# Patient Record
Sex: FEMALE | Race: BLACK OR AFRICAN AMERICAN | NOT HISPANIC OR LATINO | Employment: FULL TIME | URBAN - METROPOLITAN AREA
[De-identification: names, ages, dates, MRNs, and addresses within clinical notes are randomized per-mention and may not be internally consistent; named-entity substitution may affect disease eponyms.]

---

## 2017-01-20 ENCOUNTER — OFFICE VISIT (OUTPATIENT)
Dept: FAMILY MEDICINE | Facility: CLINIC | Age: 47
End: 2017-01-20
Payer: COMMERCIAL

## 2017-01-20 VITALS
HEIGHT: 68 IN | HEART RATE: 93 BPM | OXYGEN SATURATION: 100 % | BODY MASS INDEX: 32.89 KG/M2 | WEIGHT: 217 LBS | SYSTOLIC BLOOD PRESSURE: 120 MMHG | TEMPERATURE: 97.5 F | DIASTOLIC BLOOD PRESSURE: 78 MMHG

## 2017-01-20 DIAGNOSIS — M25.561 ACUTE PAIN OF BOTH KNEES: Primary | ICD-10-CM

## 2017-01-20 DIAGNOSIS — M25.562 ACUTE PAIN OF BOTH KNEES: Primary | ICD-10-CM

## 2017-01-20 PROCEDURE — 99213 OFFICE O/P EST LOW 20 MIN: CPT | Performed by: PHYSICIAN ASSISTANT

## 2017-01-20 RX ORDER — NAPROXEN 500 MG/1
500 TABLET ORAL 2 TIMES DAILY PRN
Qty: 30 TABLET | Refills: 1 | Status: SHIPPED | OUTPATIENT
Start: 2017-01-20 | End: 2017-05-31

## 2017-01-20 ASSESSMENT — PAIN SCALES - GENERAL: PAINLEVEL: NO PAIN (0)

## 2017-01-20 NOTE — PROGRESS NOTES
"  SUBJECTIVE:                                                    Liane Key is a 46 year old female who presents to clinic today for the following health issues:      Musculoskeletal problem/pain      Duration: 3 weeks    Description  Location: bilateral knee pain    Intensity:  Moderate to severe    Accompanying signs and symptoms: none    History  Previous similar problem: no   Previous evaluation:  none    Precipitating or alleviating factors:  Trauma or overuse: no   Aggravating factors include: worse when first waking up in the morning, and after sitting.     Therapies tried and outcome: Epsom salt baths           Problem list and histories reviewed & adjusted, as indicated.  Additional history: unsure of cause.  She did move at the end of Dec. But did not really hurt too much after.  Pain has been going of for 3 weeks.  Right is worse than left.  She has tried some epsom salt soaks.  No medication.  No previous issues knees.      Problem list, Medication list, Allergies, and Medical/Social/Surgical histories reviewed in EPIC and updated as appropriate.    ROS:  Constitutional, HEENT, cardiovascular, pulmonary, gi and gu systems are negative, except as otherwise noted.    OBJECTIVE:                                                    /78 mmHg  Pulse 93  Temp(Src) 97.5  F (36.4  C) (Oral)  Ht 5' 7.68\" (1.719 m)  Wt 217 lb (98.431 kg)  BMI 33.31 kg/m2  SpO2 100%  Body mass index is 33.31 kg/(m^2).  GENERAL: alert and no distress  EYES: Eyes grossly normal to inspection  RESP: lungs clear to auscultation - no rales, rhonchi or wheezes  CV: regular rate and rhythm, normal S1 S2, no S3 or S4, no murmur, click or rub, no peripheral edema and peripheral pulses strong  MS: pain with getting out of chair.  Full active ROM in b/l knees with crepitus.      Diagnostic Test Results:  none      ASSESSMENT/PLAN:                                                            1. Acute pain of both knees  Ice, rest, " and nsaids over weekend.  If symptoms persist or worsen, encourage follow up with ortho.  - naproxen (NAPROSYN) 500 MG tablet; Take 1 tablet (500 mg) by mouth 2 times daily as needed for moderate pain  Dispense: 30 tablet; Refill: 1  - ORTHOPEDICS ADULT REFERRAL        Archie Marx PA-C  [facility]

## 2017-01-20 NOTE — PATIENT INSTRUCTIONS
Please ice your knee 2-3 times a day for 10-15 minutes at a time.  Take the naproxen twice a day.  If pain continues into next week and not seeing any improvement, call ortho for further evaluation.      [facility]    If you have any questions regarding to your visit please contact your care team:       Team Purple:   Clinic Hours Telephone Number   [address]., Dr.   7am-7pm  Monday - Thursday   7am-5pm  Fridays  [phone]  (Appointment scheduling available 24/7)    Questions about your Visit?   Team Line:  [phone]   Urgent Care - [place] and [facility] - 11am-9pm Monday-Friday Saturday-Sunday- 9am-5pm   [place] - 5pm-9pm Monday-Friday Saturday-Sunday- 9am-5pm  [phone] - [place]   [phone] - [place]       What options do I have for visits at the clinic other than the traditional office visit?  To expand how we care for you, many of our providers are utilizing electronic visits (e-visits) and telephone visits, when medically appropriate, for interactions with their patients rather than a visit in the clinic.   We also offer nurse visits for many medical concerns. Just like any other service, we will bill your insurance company for this type of visit based on time spent on the phone with your provider. Not all insurance companies cover these visits. Please check with your medical insurance if this type of visit is covered. You will be responsible for any charges that are not paid by your insurance.      E-visits via Expreem:  generally incur a $35.00 fee.  Telephone visits:  Time spent on the phone: *charged based on time that is spent on the phone in increments of 10 minutes. Estimated cost:   5-10 mins $30.00   11-20 mins. $59.00   21-30 mins. $85.00     Use Expreem (secure email communication and access to your chart) to send your primary care provider a message or make an appointment. Ask someone on  your Team how to sign up for [employer].  For a Price Quote for your services, please call our Consumer Price Line at [phone].  As always, Thank you for trusting us with your health care needs!    Discharged by Peggy No CMA

## 2017-01-20 NOTE — NURSING NOTE
"Chief Complaint   Patient presents with     Knee Pain     bilateral x 3 weeks       Initial /78 mmHg  Pulse 93  Temp(Src) 97.5  F (36.4  C) (Oral)  Ht 5' 7.68\" (1.719 m)  Wt 217 lb (98.431 kg)  BMI 33.31 kg/m2  SpO2 100% Estimated body mass index is 33.31 kg/(m^2) as calculated from the following:    Height as of this encounter: 5' 7.68\" (1.719 m).    Weight as of this encounter: 217 lb (98.431 kg).  BP completed using cuff size: large    "

## 2017-01-20 NOTE — MR AVS SNAPSHOT
After Visit Summary   1/20/2017    Liane Key    MRN: [id number]           Patient Information     Date Of Birth          1970        Visit Information        Provider Department      1/20/2017 3:40 PM Archie Marx PA-C [facility]        Today's Diagnoses     Acute pain of both knees    -  1       Care Instructions    Please ice your knee 2-3 times a day for 10-15 minutes at a time.  Take the naproxen twice a day.  If pain continues into next week and not seeing any improvement, call ortho for further evaluation.      [facility]    If you have any questions regarding to your visit please contact your care team:       Team Purple:   Clinic Hours Telephone Number   MARIO Lara Dr., Dr.   7am-7pm  Monday - Thursday   7am-5pm  Fridays  [phone]  (Appointment scheduling available 24/7)    Questions about your Visit?   Team Line:  [phone]   Urgent Care - [place] - 11am-9pm Monday-Friday Saturday-Sunday- 9am-5pm   [place] - 5pm-9pm Monday-Friday Saturday-Sunday- 9am-5pm  [phone] - [place]   [phone] - [place]       What options do I have for visits at the clinic other than the traditional office visit?  To expand how we care for you, many of our providers are utilizing electronic visits (e-visits) and telephone visits, when medically appropriate, for interactions with their patients rather than a visit in the clinic.   We also offer nurse visits for many medical concerns. Just like any other service, we will bill your insurance company for this type of visit based on time spent on the phone with your provider. Not all insurance companies cover these visits. Please check with your medical insurance if this type of visit is covered. You will be responsible for any charges that are not paid by your insurance.      E-visits via [employer]:  generally incur a  $35.00 fee.  Telephone visits:  Time spent on the phone: *charged based on time that is spent on the phone in increments of 10 minutes. Estimated cost:   5-10 mins $30.00   11-20 mins. $59.00   21-30 mins. $85.00     Use [employer] (secure email communication and access to your chart) to send your primary care provider a message or make an appointment. Ask someone on your Team how to sign up for [employer].  For a Price Quote for your services, please call our [employer] Price Line at [phone].  As always, Thank you for trusting us with your health care needs!    Discharged by Peggy No CMA          Follow-ups after your visit        Additional Services     ORTHOPEDICS ADULT REFERRAL       Your provider has referred you to: [facility] [phone]   [email or web address]    Please be aware that coverage of these services is subject to the terms and limitations of your health insurance plan.  Call member services at your health plan with any benefit or coverage questions.      Please bring the following to your appointment:    >>   Any x-rays, CTs or MRIs which have been performed.  Contact the facility where they were done to arrange for  prior to your scheduled appointment.    >>   List of current medications   >>   This referral request   >>   Any documents/labs given to you for this referral                  Who to contact     If you have questions or need follow up information about today's clinic visit or your schedule please contact [facility] directly at [phone].  Normal or non-critical lab and imaging results will be communicated to you by MyChart, letter or phone within 4 business days after the clinic has received the results. If you do not hear from us within 7 days, please contact the clinic through MyChart or phone. If you have a critical or abnormal lab result, we will notify you by phone as soon as possible.  Submit refill  "requests through [employer]an or call your pharmacy and they will forward the refill request to us. Please allow 3 business days for your refill to be completed.          Additional Information About Your Visit        D8A GroupharJigs[employer] Meeting Information     Novan lets you send messages to your doctor, view your test results, renew your prescriptions, schedule appointments and more. To sign up, go to [email or web address] . Click on \"Log in\" on the left side of the screen, which will take you to the Welcome page. Then click on \"Sign up Now\" on the right side of the page.     You will be asked to enter the access code listed below, as well as some personal information. Please follow the directions to create your username and password.     Your access code is: [email or web address]  Expires: 2017  4:03 PM     Your access code will  in 90 days. If you need help or a new code, please call your [place] clinic or [phone].        Care EveryWhere ID     This is your Care EveryWhere ID. This could be used by other organizations to access your [place] medical records  [id number]        Your Vitals Were     Pulse Temperature Height BMI (Body Mass Index) Pulse Oximetry       93 97.5  F (36.4  C) (Oral) 5' 7.68\" (1.719 m) 33.31 kg/m2 100%        Blood Pressure from Last 3 Encounters:   17 120/78   16 124/88   08/15/16 106/76    Weight from Last 3 Encounters:   17 217 lb (98.431 kg)   16 221 lb (100.245 kg)   08/15/16 216 lb (97.977 kg)              We Performed the Following     ORTHOPEDICS ADULT REFERRAL          Today's Medication Changes          These changes are accurate as of: 17  4:04 PM.  If you have any questions, ask your nurse or doctor.               These medicines have changed or have updated prescriptions.        Dose/Directions    * naproxen 500 MG tablet   Commonly known as:  NAPROSYN   This may have changed:  Another medication with the same name was added. Make sure you understand how and " when to take each.   Used for:  Left ankle pain   Changed by:  Archie Marx PA-C        TAKE 1 TABLET (500 MG) BY MOUTH 2 TIMES DAILY AS NEEDED FOR MODERATE PAIN   Quantity:  60 tablet   Refills:  0       * naproxen 500 MG tablet   Commonly known as:  NAPROSYN   This may have changed:  You were already taking a medication with the same name, and this prescription was added. Make sure you understand how and when to take each.   Used for:  Acute pain of both knees   Changed by:  Archie Marx PA-C        Dose:  500 mg   Take 1 tablet (500 mg) by mouth 2 times daily as needed for moderate pain   Quantity:  30 tablet   Refills:  1       * Notice:  This list has 2 medication(s) that are the same as other medications prescribed for you. Read the directions carefully, and ask your doctor or other care provider to review them with you.         Where to get your medicines      These medications were sent to [facility] [place] - [address] [place] [address]  [address] [place] [address] [address] [facility] [address]     Phone:  [phone]    - naproxen 500 MG tablet             Primary Care Provider Office Phone # Fax #    Nahomi Castañeda [phone]       [facility] [address] [place] [address]        Thank you!     Thank you for choosing [facility]  for your care. Our goal is always to provide you with excellent care. Hearing back from our patients is one way we can continue to improve our services. Please take a few minutes to complete the written survey that you may receive in the mail after your visit with us. Thank you!             Your Updated Medication List - Protect others around you: Learn how to safely use, store and throw away your medicines at www.disposemymeds.org.          This list is accurate as of: 1/20/17  4:04 PM.  Always use your most recent med list.                   Brand Name Dispense Instructions for use    ferrous  sulfate 325 (65 FE) MG tablet    IRON    60 tablet    Take 1 tablet (325 mg) by mouth 2 times daily       levothyroxine 137 MCG tablet    SYNTHROID/LEVOTHROID    90 tablet    Take 1 tablet (137 mcg) by mouth daily       MULTI-VITAMIN PO      Take  by mouth daily.       * naproxen 500 MG tablet    NAPROSYN    60 tablet    TAKE 1 TABLET (500 MG) BY MOUTH 2 TIMES DAILY AS NEEDED FOR MODERATE PAIN       * naproxen 500 MG tablet    NAPROSYN    30 tablet    Take 1 tablet (500 mg) by mouth 2 times daily as needed for moderate pain       traZODone 50 MG tablet    DESYREL    30 tablet    TAKE 1 TABLET (50 MG) BY MOUTH AT BEDTIME       * Notice:  This list has 2 medication(s) that are the same as other medications prescribed for you. Read the directions carefully, and ask your doctor or other care provider to review them with you.

## 2017-05-12 DIAGNOSIS — E89.0 HYPOTHYROIDISM FOLLOWING RADIOIODINE THERAPY: ICD-10-CM

## 2017-05-12 RX ORDER — LEVOTHYROXINE SODIUM 137 UG/1
TABLET ORAL
Qty: 90 TABLET | Refills: 0 | Status: SHIPPED | OUTPATIENT
Start: 2017-05-12 | End: 2017-10-14

## 2017-05-12 NOTE — TELEPHONE ENCOUNTER
levothyroxine (SYNTHROID, LEVOTHROID) 137 MCG tablet     Last Written Prescription Date: 08/21/16  Last Quantity: 90, # refills: 2  Last Office Visit with G, P or [facility] prescribing provider: 01/20/17        TSH   Date Value Ref Range Status   08/15/2016 0.72 0.40 - 4.00 mU/L Final           Ana Daigle, CMA

## 2017-05-12 NOTE — TELEPHONE ENCOUNTER
Prescription approved per [facility] Refill Protocol.  Rula Parada, RN - BC

## 2017-05-31 ENCOUNTER — RADIANT APPOINTMENT (OUTPATIENT)
Dept: GENERAL RADIOLOGY | Facility: CLINIC | Age: 47
End: 2017-05-31
Attending: NURSE PRACTITIONER
Payer: COMMERCIAL

## 2017-05-31 ENCOUNTER — OFFICE VISIT (OUTPATIENT)
Dept: FAMILY MEDICINE | Facility: CLINIC | Age: 47
End: 2017-05-31
Payer: COMMERCIAL

## 2017-05-31 VITALS
OXYGEN SATURATION: 100 % | WEIGHT: 228 LBS | DIASTOLIC BLOOD PRESSURE: 86 MMHG | TEMPERATURE: 99.2 F | RESPIRATION RATE: 14 BRPM | BODY MASS INDEX: 34.56 KG/M2 | HEART RATE: 82 BPM | SYSTOLIC BLOOD PRESSURE: 132 MMHG | HEIGHT: 68 IN

## 2017-05-31 DIAGNOSIS — M54.50 ACUTE RIGHT-SIDED LOW BACK PAIN WITHOUT SCIATICA: ICD-10-CM

## 2017-05-31 DIAGNOSIS — M25.561 PATELLOFEMORAL JOINT PAIN, RIGHT: Primary | ICD-10-CM

## 2017-05-31 DIAGNOSIS — M25.561 PATELLOFEMORAL JOINT PAIN, RIGHT: ICD-10-CM

## 2017-05-31 DIAGNOSIS — Z12.31 ENCOUNTER FOR SCREENING MAMMOGRAM FOR BREAST CANCER: ICD-10-CM

## 2017-05-31 PROCEDURE — 99213 OFFICE O/P EST LOW 20 MIN: CPT | Performed by: NURSE PRACTITIONER

## 2017-05-31 PROCEDURE — 73562 X-RAY EXAM OF KNEE 3: CPT | Mod: RT

## 2017-05-31 RX ORDER — DICLOFENAC SODIUM 75 MG/1
75 TABLET, DELAYED RELEASE ORAL 2 TIMES DAILY PRN
Qty: 30 TABLET | Refills: 1 | Status: SHIPPED | OUTPATIENT
Start: 2017-05-31 | End: 2017-09-16

## 2017-05-31 ASSESSMENT — PAIN SCALES - GENERAL: PAINLEVEL: SEVERE PAIN (6)

## 2017-05-31 NOTE — NURSING NOTE
"Chief Complaint   Patient presents with     Musculoskeletal Problem     right knee       Initial /86  Pulse 82  Temp 99.2  F (37.3  C) (Oral)  Resp 14  Ht 5' 7.5\" (1.715 m)  Wt 228 lb (103.4 kg)  LMP 05/10/2017 (Approximate)  SpO2 100%  Breastfeeding? No  BMI 35.18 kg/m2 Estimated body mass index is 35.18 kg/(m^2) as calculated from the following:    Height as of this encounter: 5' 7.5\" (1.715 m).    Weight as of this encounter: 228 lb (103.4 kg).  Medication Reconciliation: complete   Maritza Lindsey CMA (AAMA)      "

## 2017-05-31 NOTE — LETTER
[facility]  [address] [place] [address]    June 1, 2017    Liane Key  [address] [place] [address]          Dear Liane,  Your results show mild wear and tear in the knee, but I suspect most of your pain is coming from the Patellofemoral Joint syndrome as discussed yesterday. Please continue with the plan discussed in clinic.   Enclosed is a copy of your results.     Results for orders placed or performed in visit on 05/31/17   XR Knee Right 3 Views    Narrative    RIGHT KNEE THREE VIEWS   5/31/2017 7:11 PM    HISTORY: Pain in right knee.    COMPARISON: None.      Impression    IMPRESSION: No fracture or osseous lesion is seen. There is mild joint  space loss in the medial compartment suggesting early degenerative  change. No other abnormality is noted.     JANEE LUA MD       If you have any questions or concerns, please call myself or my nurse at [phone].      Sincerely,      Moon Gusman, QUENTIN /pb

## 2017-05-31 NOTE — PROGRESS NOTES
SUBJECTIVE:                                                    Liane Key is a 47 year old female who presents to clinic today for the following health issues:    Musculoskeletal problem/pain      Duration: 1.5 months    Description  Location: right knee and hip    Intensity:  moderate, severe    Accompanying signs and symptoms: radiation of pain to hip and lower back    History  Previous similar problem: no   Previous evaluation:  none    Precipitating or alleviating factors:  Trauma or overuse: no   Aggravating factors include: standing, walking and climbing stairs    Therapies tried and outcome: nothing     Not taking any over the counter medications. Elevating the leg. Pain worsens after a period of rest and then standing up again. In the last few days having worsened right hip and right low back pain.    Problem list and histories reviewed & adjusted, as indicated.  Additional history: as documented    Patient Active Problem List   Diagnosis     CARDIOVASCULAR SCREENING; LDL GOAL LESS THAN 160     Graves disease     Hypothyroidism following radioiodine therapy     Anemia     Syphilis     Past Surgical History:   Procedure Laterality Date     ANKLE SURGERY  1990    left- s/p ORIF     SURGICAL HISTORY OF -   1998    removal of gallstones     TUBAL LIGATION         Social History   Substance Use Topics     Smoking status: Former Smoker     Packs/day: 0.20     Years: 5.00     Types: Cigarettes     Quit date: 5/30/2012     Smokeless tobacco: Never Used     Alcohol use Yes      Comment: occasional      Family History   Problem Relation Age of Onset     Cancer - colorectal Mother      Unknown/Adopted Maternal Grandmother      Unknown/Adopted Maternal Grandfather      Unknown/Adopted Paternal Grandmother      Unknown/Adopted Paternal Grandfather      Depression Sister      Thyroid Disease Maternal Aunt      CANCER No family hx of      DIABETES No family hx of      Hypertension No family hx of      CEREBROVASCULAR  "DISEASE No family hx of      Glaucoma No family hx of      Macular Degeneration No family hx of            Reviewed and updated as needed this visit by clinical staff  Tobacco  Allergies  Meds  Med Hx  Surg Hx  Fam Hx  Soc Hx      Reviewed and updated as needed this visit by Provider         ROS:  Constitutional, MS, neuro systems are negative, except as otherwise noted.    OBJECTIVE:                                                    /86  Pulse 82  Temp 99.2  F (37.3  C) (Oral)  Resp 14  Ht 5' 7.5\" (1.715 m)  Wt 228 lb (103.4 kg)  LMP 05/10/2017 (Approximate)  SpO2 100%  Breastfeeding? No  BMI 35.18 kg/m2  Body mass index is 35.18 kg/(m^2).  GENERAL: healthy, alert and no distress  MS: Right anterior knee tender over patellar tendon, no joint line tenderness. Pain with full flexion, crepitus with ROM. Micah's negative. Negative to varus/valgus stress. Tenderness of right paralumbar muscles and right greater trochanter.  NEURO: Normal strength and tone, mentation intact and speech normal. Patellar reflexes symmetric.    Diagnostic Test Results:  none      ASSESSMENT/PLAN:                                                          1. Patellofemoral joint pain, right  Start Physical Therapy, ice the knee two times daily, Voltaren as needed.  - XR Knee Right 3 Views; Future  - diclofenac (VOLTAREN) 75 MG EC tablet; Take 1 tablet (75 mg) by mouth 2 times daily as needed for moderate pain  Dispense: 30 tablet; Refill: 1  - [facility]    2. Acute right-sided low back pain without sciatica  I suspect this is muscular and related to compensating for the knee pain.   - diclofenac (VOLTAREN) 75 MG EC tablet; Take 1 tablet (75 mg) by mouth 2 times daily as needed for moderate pain  Dispense: 30 tablet; Refill: 1  - [facility]    3. Encounter for screening mammogram for breast cancer    - MA Digital Screening Bilateral; Future    Follow up with " PT    VAL Titus CNP  [facility]

## 2017-05-31 NOTE — MR AVS SNAPSHOT
After Visit Summary   5/31/2017    Liane Key    MRN: [id number]           Patient Information     Date Of Birth          1970        Visit Information        Provider Department      5/31/2017 6:20 PM Moon Gusman APRN [facility]        Today's Diagnoses     Patellofemoral joint pain, right    -  1    Acute right-sided low back pain without sciatica        Encounter for screening mammogram for breast cancer          Care Instructions    [facility]    If you have any questions regarding to your visit please contact your care team:       Team Red:   Clinic Hours Telephone Number   Dr. Glenis Gusman, NP   7am-7pm  Monday - Thursday   7am-5pm  Fridays  [phone]  (Appointment scheduling available 24/7)    Questions about your visit?   Team Line  [phone]   [facility] [place] [facility]am-9pm Monday-Friday Saturday-Sunday- 9am-5pm   [place] - 5pm-9pm Monday-Friday Saturday-Sunday- 9am-5pm  [phone] - [facility]  [phone] - [place]       What options do I have for visits at the clinic other than the traditional office visit?  To expand how we care for you, many of our providers are utilizing electronic visits (e-visits) and telephone visits, when medically appropriate, for interactions with their patients rather than a visit in the clinic.   We also offer nurse visits for many medical concerns. Just like any other service, we will bill your insurance company for this type of visit based on time spent on the phone with your provider. Not all insurance companies cover these visits. Please check with your medical insurance if this type of visit is covered. You will be responsible for any charges that are not paid by your insurance.      E-visits via Sourcery:  generally incur a $35.00 fee.  Telephone visits:  Time spent on the phone: *charged based on time that is  spent on the phone in increments of 10 minutes. Estimated cost:   5-10 mins $30.00   11-20 mins. $59.00   21-30 mins. $85.00     Use Camrivoxt (secure email communication and access to your chart) to send your primary care provider a message or make an appointment. Ask someone on your Team how to sign up for Instant Opinion.  For a Price Quote for your services, please call our [employer] Price Line at [phone].      As always, Thank you for trusting us with your health care needs!  Discharged by Pamela NEGRETE CMA ([facility])                Follow-ups after your visit        Additional Services     [facility] PT, HAND, AND CHIROPRACTIC REFERRAL       === This order will print in the [facility] Scheduling  Office ===    Physical therapy, hand therapy and chiropractic care are available through:    [facility]  [facility] [place] [facility] to the [facility]    Call one easy number to schedule at any of the above locations:  [phone].    Your provider has referred you to Physical Therapy at [facility] or [facility]    Indication/Reason for Referral: Knee Pain and Low Back Pain  Onset of Illness:  acute  Therapy Orders:  Evaluate and Treat  Special Programs:  None  Special Request:  None    Lisset Darling      Additional Comments for the therapist or chiropractor:      Please be aware that coverage of these services is subject to the terms and limitations of your health insurance plan.  Call member services at your health plan with any benefit or coverage questions.      Please bring the following to your appointment:    >>   Your personal calendar for scheduling future appointments  >>   Comfortable clothing                  Future tests that were ordered for you today     Open Future Orders        Priority Expected Expires Ordered    MA Digital Screening Bilateral Routine  5/31/2018 5/31/2017    XR Knee Right 3 Views Routine 5/31/2017 5/31/2018 5/31/2017            Who to contact     If you have questions or need follow up  "information about today's clinic visit or your schedule please contact [facility] directly at [phone].  Normal or non-critical lab and imaging results will be communicated to you by MyChart, letter or phone within 4 business days after the clinic has received the results. If you do not hear from us within 7 days, please contact the clinic through Netbiscuitshart or phone. If you have a critical or abnormal lab result, we will notify you by phone as soon as possible.  Submit refill requests through SoundSenasation or call your pharmacy and they will forward the refill request to us. Please allow 3 business days for your refill to be completed.          Additional Information About Your Visit        NetbiscuitsharY-Klub Information     SoundSenasation lets you send messages to your doctor, view your test results, renew your prescriptions, schedule appointments and more. To sign up, go to [email or web address] . Click on \"Log in\" on the left side of the screen, which will take you to the Welcome page. Then click on \"Sign up Now\" on the right side of the page.     You will be asked to enter the access code listed below, as well as some personal information. Please follow the directions to create your username and password.     Your access code is: [email or web address]  Expires: 2017  6:58 PM     Your access code will  in 90 days. If you need help or a new code, please call your [place] clinic or [phone].        Care EveryWhere ID     This is your Care EveryWhere ID. This could be used by other organizations to access your [place] medical records  [id number]        Your Vitals Were     Pulse Temperature Respirations Height Last Period Pulse Oximetry    82 99.2  F (37.3  C) (Oral) 14 5' 7.5\" (1.715 m) 05/10/2017 (Approximate) 100%    Breastfeeding? BMI (Body Mass Index)                No 35.18 kg/m2           Blood Pressure from Last 3 Encounters:   17 132/86   17 120/78   16 124/88    Weight from Last 3 " Encounters:   05/31/17 228 lb (103.4 kg)   01/20/17 217 lb (98.4 kg)   09/08/16 221 lb (100.2 kg)              We Performed the Following     [facility] PT, HAND, AND CHIROPRACTIC REFERRAL          Today's Medication Changes          These changes are accurate as of: 5/31/17  6:58 PM.  If you have any questions, ask your nurse or doctor.               Start taking these medicines.        Dose/Directions    diclofenac 75 MG EC tablet   Commonly known as:  VOLTAREN   Used for:  Patellofemoral joint pain, right, Acute right-sided low back pain without sciatica   Started by:  Moon Gusman APRN CNP        Dose:  75 mg   Take 1 tablet (75 mg) by mouth 2 times daily as needed for moderate pain   Quantity:  30 tablet   Refills:  1         Stop taking these medicines if you haven't already. Please contact your care team if you have questions.     naproxen 500 MG tablet   Commonly known as:  NAPROSYN   Stopped by:  Moon Gusman APRN CNP                Where to get your medicines      These medications were sent to [facility]/pharmacy #[id number] - [place], [address] OF [address], [facility] [address]     Phone:  [phone]     diclofenac 75 MG EC tablet                Primary Care Provider Office Phone # Fax #    Nahomi Castañeda [phone]       [address] [address]        Thank you!     Thank you for choosing [facility]  for your care. Our goal is always to provide you with excellent care. Hearing back from our patients is one way we can continue to improve our services. Please take a few minutes to complete the written survey that you may receive in the mail after your visit with us. Thank you!             Your Updated Medication List - Protect others around you: Learn how to safely use, store and throw away your medicines at www.disposemymeds.org.          This list is accurate as of:  5/31/17  6:58 PM.  Always use your most recent med list.                   Brand Name Dispense Instructions for use    diclofenac 75 MG EC tablet    VOLTAREN    30 tablet    Take 1 tablet (75 mg) by mouth 2 times daily as needed for moderate pain       ferrous sulfate 325 (65 FE) MG tablet    IRON    60 tablet    Take 1 tablet (325 mg) by mouth 2 times daily       levothyroxine 137 MCG tablet    SYNTHROID/LEVOTHROID    90 tablet    TAKE 1 TABLET (137 MCG) BY MOUTH DAILY       MULTI-VITAMIN PO      Take  by mouth daily.       traZODone 50 MG tablet    DESYREL    30 tablet    TAKE 1 TABLET (50 MG) BY MOUTH AT BEDTIME

## 2017-05-31 NOTE — LETTER
[facility]  [address]  [place] [address]  [phone]  Dept: [phone]      5/31/2017    Re: Liane Key      TO WHOM IT MAY CONCERN:    Liane Key  was seen on 05/31/17.  She must be allowed to sit for 10 minutes once hourly; max standing time of 1 hour continuously. Restrictions apply for 2 weeks from today.    Cordially    VAL Titus CNP  [facility]

## 2017-05-31 NOTE — PATIENT INSTRUCTIONS
[facility]    If you have any questions regarding to your visit please contact your care team:       Team Red:   Clinic Hours Telephone Number   Dr. Glenis Gusman, NP   7am-7pm  Monday - Thursday   7am-5pm  Fridays  [phone]  (Appointment scheduling available 24/7)    Questions about your visit?   Team Line  [phone]   [facility] [place] [facility] 11am-9pm Monday-Friday Saturday-Sunday- 9am-5pm   [place] - 5pm-9pm Monday-Friday Saturday-Sunday- 9am-5pm  [phone] - [place]   [phone] - [place]       What options do I have for visits at the clinic other than the traditional office visit?  To expand how we care for you, many of our providers are utilizing electronic visits (e-visits) and telephone visits, when medically appropriate, for interactions with their patients rather than a visit in the clinic.   We also offer nurse visits for many medical concerns. Just like any other service, we will bill your insurance company for this type of visit based on time spent on the phone with your provider. Not all insurance companies cover these visits. Please check with your medical insurance if this type of visit is covered. You will be responsible for any charges that are not paid by your insurance.      E-visits via Stemnion:  generally incur a $35.00 fee.  Telephone visits:  Time spent on the phone: *charged based on time that is spent on the phone in increments of 10 minutes. Estimated cost:   5-10 mins $30.00   11-20 mins. $59.00   21-30 mins. $85.00     Use Thinkspeedt (secure email communication and access to your chart) to send your primary care provider a message or make an appointment. Ask someone on your Team how to sign up for St[employer]ion.  For a Price Quote for your services, please call our [employer] Price Line at [phone].      As always, Thank you for trusting us with your health care needs!  Discharged  by Pamela NEGRETE CMA ([facility])

## 2017-06-01 NOTE — PROGRESS NOTES
Please mail letter:    Your results show mild wear and tear in the knee, but I suspect most of your pain is coming from the Patellofemoral Joint syndrome as discussed yesterday. Please continue with the plan discussed in clinic.    Moon Gusman, CNP

## 2017-06-02 ENCOUNTER — TELEPHONE (OUTPATIENT)
Dept: FAMILY MEDICINE | Facility: CLINIC | Age: 47
End: 2017-06-02

## 2017-06-02 NOTE — TELEPHONE ENCOUNTER
Reason for Call:  Other call back    Detailed comments:  Patient calling. She was seen on Wednesday for her knees. She is now having back pain. Can she get an order for an mri, a note to be off work for a week, and a referral for therapy. Please call and let know.     Phone Number Patient can be reached at: Home number on file [phone] (home)    Best Time:  Any    Can we leave a detailed message on this number? YES    Call taken on 6/2/2017 at 4:50 PM by Yudy Hanks

## 2017-06-05 NOTE — TELEPHONE ENCOUNTER
Moon is out of the office today  She needs to be seen for the back pain issues, unless this can be managed with the low back pain protocol that RNs do.  Isabelle Woodward MD

## 2017-06-05 NOTE — TELEPHONE ENCOUNTER
Pt was seen by Moon Gusman CNP on 5/31/17.  There is already a referral for PT. Referral information will be provided to patient at once when the rest of the message below addressed.  Please review the message below and advise.    Nirmala MARCIAL RN, BSN

## 2017-06-05 NOTE — TELEPHONE ENCOUNTER
RN spoke with patient and states she can't go to any therapy because she works until 4:30 PM and she can't get an off from work back to back.  Pt states her pain started from her legs and then to her knees and now to her back and radiates to her shoulders.  Pt would like to know what is the next step.  Pt denies of CH, headache, weakness, dizziness, palpitation, SOB, difficulty breathing a this time.    RN advised patient to be seen in the clinic for further evaluation since her pain started from a different area and traveling up to her shoulders and since she can't attend PT with her work schedule.  Pt agrees to follow up in the clinic with a provider.  Will route to Moon Gusman CNP as FYI since patient wanted to know what is the next step.  Pt is aware Moon Gusman CNP is out of the office this week.     Nirmala MARCIAL RN, BSN

## 2017-06-12 NOTE — TELEPHONE ENCOUNTER
RN notified patient of the provider's message as it's written below.  Patient agrees and verbalized understanding.   RN helped patient scheduled for a F/U appointment with Moon Gusman CNP on 6/14/17 at 5:40 PM since patient can't come any time before 4:30 PM.    Nirmala MARCIAL RN, BSN

## 2017-06-12 NOTE — TELEPHONE ENCOUNTER
Needs to be seen in clinic to discuss work restrictions and make a new plan for managing her back pain as she is unable to do Physical Therapy.  Moon Gusman, CNP

## 2017-06-14 ENCOUNTER — RADIANT APPOINTMENT (OUTPATIENT)
Dept: GENERAL RADIOLOGY | Facility: CLINIC | Age: 47
End: 2017-06-14
Attending: NURSE PRACTITIONER
Payer: MEDICAID

## 2017-06-14 ENCOUNTER — OFFICE VISIT (OUTPATIENT)
Dept: FAMILY MEDICINE | Facility: CLINIC | Age: 47
End: 2017-06-14
Payer: MEDICAID

## 2017-06-14 VITALS
BODY MASS INDEX: 34.84 KG/M2 | OXYGEN SATURATION: 97 % | HEART RATE: 90 BPM | RESPIRATION RATE: 20 BRPM | SYSTOLIC BLOOD PRESSURE: 118 MMHG | DIASTOLIC BLOOD PRESSURE: 84 MMHG | TEMPERATURE: 97 F | HEIGHT: 68 IN | WEIGHT: 229.9 LBS

## 2017-06-14 DIAGNOSIS — M25.561 PATELLOFEMORAL JOINT PAIN, RIGHT: ICD-10-CM

## 2017-06-14 DIAGNOSIS — M54.50 ACUTE RIGHT-SIDED LOW BACK PAIN WITHOUT SCIATICA: Primary | ICD-10-CM

## 2017-06-14 DIAGNOSIS — M54.50 ACUTE RIGHT-SIDED LOW BACK PAIN WITHOUT SCIATICA: ICD-10-CM

## 2017-06-14 PROCEDURE — 99213 OFFICE O/P EST LOW 20 MIN: CPT | Performed by: NURSE PRACTITIONER

## 2017-06-14 PROCEDURE — 72100 X-RAY EXAM L-S SPINE 2/3 VWS: CPT

## 2017-06-14 RX ORDER — CYCLOBENZAPRINE HCL 10 MG
5-10 TABLET ORAL
Qty: 30 TABLET | Refills: 1 | Status: SHIPPED | OUTPATIENT
Start: 2017-06-14 | End: 2018-04-05

## 2017-06-14 NOTE — PROGRESS NOTES
SUBJECTIVE:                                                    Liane Key is a 47 year old female who presents to clinic today for the following health issues:    Musculoskeletal problem/pain      Duration: x 2 month     Description  Location: right legs, and right side of the lower back     Intensity:  moderate, severe    Accompanying signs and symptoms: radiation of pain from the knee to the lower back area, numbness, tingling and weakness of the right side     History  Previous similar problem: no   Previous evaluation:  x-ray on the knee     Precipitating or alleviating factors:  Trauma or overuse: no   Aggravating factors include: standing, walking, climbing stairs, lifting, exercise, overuse and from sitting to standing     Therapies tried and outcome: rest/inactivity     Was seen 2 weeks ago for knee pain, then the next day developed severe right low back pain when she rolled over in bed. Twisting and lifting are painful. Taking Voltaren for knee pain with some relief.     Works with vulnerable adults in the community. No heavy lifting but is required to stand most of the day.      Problem list and histories reviewed & adjusted, as indicated.  Additional history: as documented    Patient Active Problem List   Diagnosis     CARDIOVASCULAR SCREENING; LDL GOAL LESS THAN 160     Graves disease     Hypothyroidism following radioiodine therapy     Anemia     Syphilis     Past Surgical History:   Procedure Laterality Date     ANKLE SURGERY  1990    left- s/p ORIF     SURGICAL HISTORY OF -   1998    removal of gallstones     TUBAL LIGATION         Social History   Substance Use Topics     Smoking status: Former Smoker     Packs/day: 0.20     Years: 5.00     Types: Cigarettes     Quit date: 5/30/2012     Smokeless tobacco: Never Used     Alcohol use Yes      Comment: occasional      Family History   Problem Relation Age of Onset     Cancer - colorectal Mother      Unknown/Adopted Maternal Grandmother       "Unknown/Adopted Maternal Grandfather      Unknown/Adopted Paternal Grandmother      Unknown/Adopted Paternal Grandfather      Depression Sister      Thyroid Disease Maternal Aunt      CANCER No family hx of      DIABETES No family hx of      Hypertension No family hx of      CEREBROVASCULAR DISEASE No family hx of      Glaucoma No family hx of      Macular Degeneration No family hx of            Reviewed and updated as needed this visit by clinical staff  Tobacco  Allergies  Meds  Med Hx  Surg Hx  Fam Hx  Soc Hx      Reviewed and updated as needed this visit by Provider         ROS:  Constitutional, MS, neuro systems are negative, except as otherwise noted.    OBJECTIVE:                                                    /84 (BP Location: Left arm, Patient Position: Chair, Cuff Size: Adult Regular)  Pulse 90  Temp 97  F (36.1  C) (Oral)  Resp 20  Ht 5' 7.5\" (1.715 m)  Wt 229 lb 14.4 oz (104.3 kg)  LMP 06/08/2017 (Approximate)  SpO2 97%  Breastfeeding? No  BMI 35.48 kg/m2  Body mass index is 35.48 kg/(m^2).  GENERAL: healthy, alert and no distress  Comprehensive back pain exam:  Tenderness of right paralumbar muscles, Pain limits the following motions: flexion, rotation, Lower extremity strength functional and equal on both sides, Lower extremity reflexes within normal limits bilaterally, Lower extremity sensation normal and equal on both sides and Straight leg raise negative bilaterally    Diagnostic Test Results:  none      ASSESSMENT/PLAN:                                                        1. Acute right-sided low back pain without sciatica  I suspect this is mechanical low back pain. Trial of Flexeril and patient has already been referred to Physical Therapy. She was concerned about having time to do Physical Therapy with her work schedule but there are evening appointments available so she will proceed with Physical Therapy.  - XR Lumbar Spine 2/3 Views; Future  - cyclobenzaprine " (FLEXERIL) 10 MG tablet; Take 0.5-1 tablets (5-10 mg) by mouth nightly as needed for muscle spasms  Dispense: 30 tablet; Refill: 1    2. Patellofemoral joint pain, right  Patient requests knee brace to use while standing at work. Has been referred to Physical Therapy for her knee.  - order for DME; by * route continuous Neoprene knee sleeve - use as instructed  Dispense: 1 Device; Refill: 0    Follow up with Physical Therapy    VAL Titus CNP  [facility]

## 2017-06-14 NOTE — NURSING NOTE
"Chief Complaint   Patient presents with     Musculoskeletal Problem     Back Pain       Initial /84 (BP Location: Left arm, Patient Position: Chair, Cuff Size: Adult Regular)  Pulse 90  Temp 97  F (36.1  C) (Oral)  Resp 20  Ht 5' 7.5\" (1.715 m)  Wt 229 lb 14.4 oz (104.3 kg)  LMP 06/08/2017 (Approximate)  SpO2 97%  Breastfeeding? No  BMI 35.48 kg/m2 Estimated body mass index is 35.48 kg/(m^2) as calculated from the following:    Height as of this encounter: 5' 7.5\" (1.715 m).    Weight as of this encounter: 229 lb 14.4 oz (104.3 kg).  Medication Reconciliation: complete   Tonny Pak      "

## 2017-06-14 NOTE — MR AVS SNAPSHOT
After Visit Summary   6/14/2017    Liane Key    MRN: [id number]           Patient Information     Date Of Birth          1970        Visit Information        Provider Department      6/14/2017 5:40 PM Moon Gusman APRN [facility]        Today's Diagnoses     Acute right-sided low back pain without sciatica    -  1    Patellofemoral joint pain, right          Care Instructions    When You Have Low Back Pain    Caring for Your Back  You are not alone.    Low back pain is very common. Nearly half of all adults have low back pain in any given year. The good news is that back pain is rarely a danger to your health. Most people can manage their back pain on their own and about half of them start feeling better within 2 weeks. In 9 out of 10 cases, low back pain goes away or no longer limits daily activity within 6 weeks.     Your outlook is good!    Your symptoms tell us that your low back pain is most likely not a danger to you. Most of the time we do not know the exact cause of low back pain, even if you see a doctor or have an MRI. However, treatment can still work without knowing the cause of the pain. Less than 1 in 100 people need surgery for their back pain.     What can I do about my low back pain?     There are three things you can do to ease low back pain and help it go away.    Use heat or cold packs.    Take medicine as directed.    Use positions, movements and exercises.     Using heat or cold packs    Try cold packs or gentle heat to ease your pain. Use whichever gives you the most relief. Apply the cold pack or heat for 15 minutes at a time, as often as needed.    Taking medicine      If your doctor has prescribed medicine, be sure to follow the directions.    If you take over-the-counter medicine, read and follow the directions.    Talk to your doctor if you have any questions.     Using positions, movements and exercises    Research tells us that moving  your joints and muscles can help you recover from back pain. Such activity should be simple and gentle. Use the positions in the photos as well as walking to help relieve your pain. Try taking a short walk every 3 to 4 hours during the day. Walk for a few minutes inside your home or take longer walks outside, on a treadmill or at a mall. Slowly increase the amount of time you walk. Expect discomfort when you begin, but it should lessen as your back starts to heal. When your back feels better, walk daily to keep your back and body healthy.    Finding a comfortable position    When your back pain is new, certain positions will ease your pain. Gently try each of the positions below until you find one that is helpful. Once you find a position of comfort, use it as often as you like when you are resting. You will recover faster if you combine rest with activity.         Lie on your back with your legs bent. You can do this by placing a pillow under your knees. Or you may lie on the floor and rest your lower legs on the seat of a chair.       Lie on your side with your knees bent, and place a pillow between your knees.       Lie on your stomach over pillows.      When should I call my doctor?    Your back pain should improve over the first couple of weeks. As it improves, you should be able to return to your normal activities. But call your doctor if:    You have a sudden change in your ability to control your bladder or bowels.    You feel tingling in your groin or legs.    The pain spreads down your leg and into your foot.    Your toes, feet or leg muscles feel weak.    You feel generally unwell or sick.    Your pain does not get better or gets worse.      If you are deaf or hard of hearing, please let us know. We provide many free services including sign language interpreters,oral interpreters, TTYs, telephone amplifiers, note takers and written materials.    For informational purposes only. Not to replace the advice  of your health care provider. Copyright   2013 [facility]. All rights reserved. [employer] [id number] - Rev 06/14.      [facility]    If you have any questions regarding to your visit please contact your care team:       Team Red:   Clinic Hours Telephone Number   Dr. Glenis Gusman, NP   7am-7pm  Monday - Thursday   7am-5pm  Fridays  [phone]  (Appointment scheduling available 24/7)    Questions about your visit?   Team Line  [phone]   Urgent Care - [place] - 11am-9pm Monday-Friday Saturday-Sunday- 9am-5pm   [place] - 5pm-9pm Monday-Friday Saturday-Sunday- 9am-5pm  [phone] - [facility]  [phone] - [place]       What options do I have for visits at the clinic other than the traditional office visit?  To expand how we care for you, many of our providers are utilizing electronic visits (e-visits) and telephone visits, when medically appropriate, for interactions with their patients rather than a visit in the clinic.   We also offer nurse visits for many medical concerns. Just like any other service, we will bill your insurance company for this type of visit based on time spent on the phone with your provider. Not all insurance companies cover these visits. Please check with your medical insurance if this type of visit is covered. You will be responsible for any charges that are not paid by your insurance.      E-visits via [email or web address]:  generally incur a $35.00 fee.  Telephone visits:  Time spent on the phone: *charged based on time that is spent on the phone in increments of 10 minutes. Estimated cost:   5-10 mins $30.00   11-20 mins. $59.00   21-30 mins. $85.00     Use [employer] (secure email communication and access to your chart) to send your primary care provider a message or make an appointment. Ask someone on your Team how to sign up for [email or web address].  For a Price Quote for your services, please  "call our Consumer Price Line at [phone].      As always, Thank you for trusting us with your health care needs!  Discharged by Pamela NEGRETE CMA ([facility])            Follow-ups after your visit        Your next 10 appointments already scheduled     Andre 15, 2017  5:00 PM CDT   New Visit with Ruben Csaas DPM   [facility]    [address] [place] [address]  [place] [id number] [phone]              Future tests that were ordered for you today     Open Future Orders        Priority Expected Expires Ordered    XR Lumbar Spine 2/3 Views Routine 6/14/2017 6/14/2018 6/14/2017            Who to contact     If you have questions or need follow up information about today's clinic visit or your schedule please contact [facility] directly at [phone].  Normal or non-critical lab and imaging results will be communicated to you by MyChart, letter or phone within 4 business days after the clinic has received the results. If you do not hear from us within 7 days, please contact the clinic through MyChart or phone. If you have a critical or abnormal lab result, we will notify you by phone as soon as possible.  Submit refill requests through [employer] or call your pharmacy and they will forward the refill request to us. Please allow 3 business days for your refill to be completed.          Additional Information About Your Visit        MyChart Information     [employer] lets you send messages to your doctor, view your test results, renew your prescriptions, schedule appointments and more. To sign up, go to [email or web address] . Click on \"Log in\" on the left side of the screen, which will take you to the Welcome page. Then click on \"Sign up Now\" on the right side of the page.     You will be asked to enter the access code listed below, as well as some personal information. Please follow the directions to create your username and password.     Your access code is: " "[email or web address]  Expires: 2017  6:58 PM     Your access code will  in 90 days. If you need help or a new code, please call your [place] clinic or [phone].        Care EveryWhere ID     This is your Care EveryWhere ID. This could be used by other organizations to access your [place] medical records  [id number]        Your Vitals Were     Pulse Temperature Respirations Height Last Period Pulse Oximetry    90 97  F (36.1  C) (Oral) 20 5' 7.5\" (1.715 m) 2017 (Approximate) 97%    Breastfeeding? BMI (Body Mass Index)                No 35.48 kg/m2           Blood Pressure from Last 3 Encounters:   17 118/84   17 132/86   17 120/78    Weight from Last 3 Encounters:   17 229 lb 14.4 oz (104.3 kg)   17 228 lb (103.4 kg)   17 217 lb (98.4 kg)                 Today's Medication Changes          These changes are accurate as of: 17  6:01 PM.  If you have any questions, ask your nurse or doctor.               Start taking these medicines.        Dose/Directions    cyclobenzaprine 10 MG tablet   Commonly known as:  FLEXERIL   Used for:  Acute right-sided low back pain without sciatica   Started by:  Moon Gusman APRN CNP        Dose:  5-10 mg   Take 0.5-1 tablets (5-10 mg) by mouth nightly as needed for muscle spasms   Quantity:  30 tablet   Refills:  1       order for DME   Used for:  Patellofemoral joint pain, right   Started by:  Moon Gusman APRN CNP        by * route continuous Neoprene knee sleeve - use as instructed   Quantity:  1 Device   Refills:  0            Where to get your medicines      These medications were sent to [facility]/pharmacy #[id number] - [place], [place] - [address] [place] [id number], [place] [address]     Phone:  [phone]     cyclobenzaprine 10 MG tablet         Some of these will need a paper prescription and others can be bought over the counter.  Ask your nurse if you have " questions.     Bring a paper prescription for each of these medications     order for DME                Primary Care Provider Office Phone # Fax #    Nahomi Castañeda -[phone]       [facility] [address] [address]        Thank you!     Thank you for choosing [facility]  for your care. Our goal is always to provide you with excellent care. Hearing back from our patients is one way we can continue to improve our services. Please take a few minutes to complete the written survey that you may receive in the mail after your visit with us. Thank you!             Your Updated Medication List - Protect others around you: Learn how to safely use, store and throw away your medicines at www.disposemymeds.org.          This list is accurate as of: 6/14/17  6:02 PM.  Always use your most recent med list.                   Brand Name Dispense Instructions for use    cyclobenzaprine 10 MG tablet    FLEXERIL    30 tablet    Take 0.5-1 tablets (5-10 mg) by mouth nightly as needed for muscle spasms       diclofenac 75 MG EC tablet    VOLTAREN    30 tablet    Take 1 tablet (75 mg) by mouth 2 times daily as needed for moderate pain       ferrous sulfate 325 (65 FE) MG tablet    IRON    60 tablet    Take 1 tablet (325 mg) by mouth 2 times daily       levothyroxine 137 MCG tablet    SYNTHROID/LEVOTHROID    90 tablet    TAKE 1 TABLET (137 MCG) BY MOUTH DAILY       MULTI-VITAMIN PO      Take  by mouth daily.       order for DME     1 Device    by * route continuous Neoprene knee sleeve - use as instructed       traZODone 50 MG tablet    DESYREL    30 tablet    TAKE 1 TABLET (50 MG) BY MOUTH AT BEDTIME

## 2017-06-14 NOTE — PATIENT INSTRUCTIONS
When You Have Low Back Pain    Caring for Your Back  You are not alone.    Low back pain is very common. Nearly half of all adults have low back pain in any given year. The good news is that back pain is rarely a danger to your health. Most people can manage their back pain on their own and about half of them start feeling better within 2 weeks. In 9 out of 10 cases, low back pain goes away or no longer limits daily activity within 6 weeks.     Your outlook is good!    Your symptoms tell us that your low back pain is most likely not a danger to you. Most of the time we do not know the exact cause of low back pain, even if you see a doctor or have an MRI. However, treatment can still work without knowing the cause of the pain. Less than 1 in 100 people need surgery for their back pain.     What can I do about my low back pain?     There are three things you can do to ease low back pain and help it go away.    Use heat or cold packs.    Take medicine as directed.    Use positions, movements and exercises.     Using heat or cold packs    Try cold packs or gentle heat to ease your pain. Use whichever gives you the most relief. Apply the cold pack or heat for 15 minutes at a time, as often as needed.    Taking medicine      If your doctor has prescribed medicine, be sure to follow the directions.    If you take over-the-counter medicine, read and follow the directions.    Talk to your doctor if you have any questions.     Using positions, movements and exercises    Research tells us that moving your joints and muscles can help you recover from back pain. Such activity should be simple and gentle. Use the positions in the photos as well as walking to help relieve your pain. Try taking a short walk every 3 to 4 hours during the day. Walk for a few minutes inside your home or take longer walks outside, on a treadmill or at a mall. Slowly increase the amount of time you walk. Expect discomfort when you begin, but it should  lessen as your back starts to heal. When your back feels better, walk daily to keep your back and body healthy.    Finding a comfortable position    When your back pain is new, certain positions will ease your pain. Gently try each of the positions below until you find one that is helpful. Once you find a position of comfort, use it as often as you like when you are resting. You will recover faster if you combine rest with activity.         Lie on your back with your legs bent. You can do this by placing a pillow under your knees. Or you may lie on the floor and rest your lower legs on the seat of a chair.       Lie on your side with your knees bent, and place a pillow between your knees.       Lie on your stomach over pillows.      When should I call my doctor?    Your back pain should improve over the first couple of weeks. As it improves, you should be able to return to your normal activities. But call your doctor if:    You have a sudden change in your ability to control your bladder or bowels.    You feel tingling in your groin or legs.    The pain spreads down your leg and into your foot.    Your toes, feet or leg muscles feel weak.    You feel generally unwell or sick.    Your pain does not get better or gets worse.      If you are deaf or hard of hearing, please let us know. We provide many free services including sign language interpreters,oral interpreters, TTYs, telephone amplifiers, note takers and written materials.    For informational purposes only. Not to replace the advice of your health care provider. Copyright   2013 [facility]. All rights reserved. [employer] 498302   Rev 06/14.      [facility]    If you have any questions regarding to your visit please contact your care team:       Team Red:   Clinic Hours Telephone Number   Dr. Glenis Gusman, NP   7am-7pm  Monday - Thursday   7am-5pm  Fridays  [phone]   (Appointment scheduling available 24/7)    Questions about your visit?   Team Line  [phone]   Urgent Care - [place] and [place] - 11am-9pm Monday-Friday Saturday-Sunday- 9am-5pm   [place] - 5pm-9pm Monday-Friday Saturday-Sunday- 9am-5pm  [phone] - [place]   [phone] - [place]       What options do I have for visits at the clinic other than the traditional office visit?  To expand how we care for you, many of our providers are utilizing electronic visits (e-visits) and telephone visits, when medically appropriate, for interactions with their patients rather than a visit in the clinic.   We also offer nurse visits for many medical concerns. Just like any other service, we will bill your insurance company for this type of visit based on time spent on the phone with your provider. Not all insurance companies cover these visits. Please check with your medical insurance if this type of visit is covered. You will be responsible for any charges that are not paid by your insurance.      E-visits via [employer]:  generally incur a $35.00 fee.  Telephone visits:  Time spent on the phone: *charged based on time that is spent on the phone in increments of 10 minutes. Estimated cost:   5-10 mins $30.00   11-20 mins. $59.00   21-30 mins. $85.00     Use [email or web address] (secure email communication and access to your chart) to send your primary care provider a message or make an appointment. Ask someone on your Team how to sign up for [employer].  For a Price Quote for your services, please call our Consumer Price Line at [phone].      As always, Thank you for trusting us with your health care needs!  Discharged by Pamela NEGRETE CMA ([facility])

## 2017-06-14 NOTE — LETTER
[facility]  [address] [place] [address]    Kinga 15, 2017    Liane Key  [address]  [place] [address]          Dear Liane,    Your results are normal    Enclosed is a copy of your results.     Results for orders placed or performed in visit on 06/14/17   XR Lumbar Spine 2/3 Views    Narrative    XR LUMBAR SPINE 2-3 VIEWS   6/14/2017 6:23 PM     HISTORY: Low back pain    COMPARISON: None.      Impression    IMPRESSION: Normal. Incidental note is made of surgical clips in the  right upper quadrant.    GEORGE HOLLINS MD       If you have any questions or concerns, please call myself or my nurse at [phone].      Sincerely,        Moon Gusman APRN CNP /NAGEL

## 2017-06-14 NOTE — LETTER
[facility]  [address]  [place] [address]  [phone]  Dept: [phone]      5/31/2017    Re: Liane Key      TO WHOM IT MAY CONCERN:    Liane Key  was seen on 05/31/17 and 06/14/17.  She must be allowed to sit for 10 minutes once hourly; max standing time of 1 hour continuously. Restrictions apply for 2 weeks from 06/14/17.    VAL Lusi CNP  [facility]

## 2017-08-24 ENCOUNTER — OFFICE VISIT (OUTPATIENT)
Dept: PODIATRY | Facility: CLINIC | Age: 47
End: 2017-08-24
Payer: COMMERCIAL

## 2017-08-24 VITALS — RESPIRATION RATE: 16 BRPM | WEIGHT: 229 LBS | BODY MASS INDEX: 34.71 KG/M2 | HEIGHT: 68 IN

## 2017-08-24 DIAGNOSIS — B35.3 TINEA PEDIS OF RIGHT FOOT: Primary | ICD-10-CM

## 2017-08-24 PROCEDURE — 99203 OFFICE O/P NEW LOW 30 MIN: CPT | Performed by: PODIATRIST

## 2017-08-24 NOTE — PATIENT INSTRUCTIONS
We wish you continued good healing. If you have any questions or concerns, please do not hesitate to contact us at [phone]      Please remember to call and schedule a follow up appointment if one was recommended at your earliest convenience.   PODIATRY CLINIC HOURS  TELEPHONE NUMBER    Dr. Ruben Casas D.P.M [facility]    Clinics:  [facility]        Risa Ford MA  Medical Assistant  Tuesday 1PM-6PM  [facility]  Wednesday 7AM-2PM  [place]  Thursday 10AM-6PM  [place] Friday 7AM-345PM  [place]  Specialty schedulers:   [phone] to make an appointment with any Specialty Provider.        Urgent Care locations:    [facility] Monday-Friday 5 pm - 9 pm. Saturday-Sunday 9 am -5pm    Monday-Friday 11 am - 9 pm Saturday 9 am - 5 pm     Monday-Sunday 12 noon-8PM [phone]     If you need a medication refill, please contact us you may need lab work and/or a follow up visit prior to your refill (i.e. Antifungal medications).    [employer] (secure e-mail communication and access to your chart) to send a message or to make an appointment.    If MRI needed please call Kobi Conklin at [phone]        Weight management plan: Patient was referred to their PCP to discuss a diet and exercise plan.

## 2017-08-24 NOTE — PROGRESS NOTES
Patient complains of a rash on right foot.  Has had this for 2 months.  It is slowly getting worse.  Has itching with this and peeling skin.  Patient has also noticed some vesicles.  Points to digital interspaces and medial heel.     ROS:  No hx of erythema, edema, drainage.   Denies numbness or tingling in feet.     No Known Allergies    Current Outpatient Prescriptions   Medication Sig Dispense Refill     cyclobenzaprine (FLEXERIL) 10 MG tablet Take 0.5-1 tablets (5-10 mg) by mouth nightly as needed for muscle spasms 30 tablet 1     order for DME by * route continuous Neoprene knee sleeve - use as instructed 1 Device 0     diclofenac (VOLTAREN) 75 MG EC tablet Take 1 tablet (75 mg) by mouth 2 times daily as needed for moderate pain 30 tablet 1     levothyroxine (SYNTHROID/LEVOTHROID) 137 MCG tablet TAKE 1 TABLET (137 MCG) BY MOUTH DAILY 90 tablet 0     traZODone (DESYREL) 50 MG tablet TAKE 1 TABLET (50 MG) BY MOUTH AT BEDTIME 30 tablet 7     ferrous sulfate 325 (65 FE) MG tablet Take 1 tablet (325 mg) by mouth 2 times daily 60 tablet 5     Multiple Vitamin (MULTI-VITAMIN PO) Take  by mouth daily.         Patient Active Problem List   Diagnosis     CARDIOVASCULAR SCREENING; LDL GOAL LESS THAN 160     Graves disease     Hypothyroidism following radioiodine therapy     Anemia     Syphilis       Past Medical History:   Diagnosis Date     Graves disease      Hypothyroidism following radioiodine therapy        Past Surgical History:   Procedure Laterality Date     ANKLE SURGERY  1990    left- s/p ORIF     SURGICAL HISTORY OF -   1998    removal of gallstones     TUBAL LIGATION         Family History   Problem Relation Age of Onset     Cancer - colorectal Mother      Unknown/Adopted Maternal Grandmother      Unknown/Adopted Maternal Grandfather      Unknown/Adopted Paternal Grandmother      Unknown/Adopted Paternal Grandfather      Depression Sister      Thyroid Disease Maternal Aunt      CANCER No family hx of       "DIABETES No family hx of      Hypertension No family hx of      CEREBROVASCULAR DISEASE No family hx of      Glaucoma No family hx of      Macular Degeneration No family hx of        Social History   Substance Use Topics     Smoking status: Former Smoker     Packs/day: 0.20     Years: 5.00     Types: Cigarettes     Quit date: 5/30/2012     Smokeless tobacco: Never Used     Alcohol use Yes      Comment: occasional          Resp 16  Ht 1.715 m (5' 7.5\")  Wt 103.9 kg (229 lb)  BMI 35.34 kg/m2.  A&O X 3.  Good historian.  Pulses DP, PT 2/4 b/l.  CRT < 3 seconds X 10 digits.  No edema or varicosities noted.  Sensation to light touch intact b/l.  Reflexes 2/4 b/l.  Skin has normal texture and turgor b/l.  Normal arch with weightbearing.  No forefoot or rear foot deformities noted.  MS 5/5 all compartments.  Normal ROM all fore foot and rearfoot joints.  No equinus.  right foot in digital interspaces and medial has erythematous, peeling skin with some vesicles noted.  No  edema, drainage, pain on palpation.  No signs of subungual masses or exostosis.    A/P  right tinea pedis     Discussed all options with patient.  Will start using Lamisil cream until this resolves.  Gave instructions on rotating shoes and keeping feet dry as possible.  Return to clinic prn.    Ruben Casas DPM, FACFAS    "

## 2017-08-24 NOTE — NURSING NOTE
"Chief Complaint   Patient presents with     Consult     Right foot pain       Initial Resp 16  Ht 1.715 m (5' 7.5\")  Wt 103.9 kg (229 lb)  BMI 35.34 kg/m2 Estimated body mass index is 35.34 kg/(m^2) as calculated from the following:    Height as of this encounter: 1.715 m (5' 7.5\").    Weight as of this encounter: 103.9 kg (229 lb).  Medication Reconciliation: complete   Alla Orosco CMA 8/24/2017 5:25 PM      "

## 2017-08-24 NOTE — MR AVS SNAPSHOT
After Visit Summary   8/24/2017    Liane Key    MRN: [id number]           Patient Information     Date Of Birth          1970        Visit Information        Provider Department      8/24/2017 5:00 PM Ruben Casas, DPM [facility]        Care Instructions    We wish you continued good healing. If you have any questions or concerns, please do not hesitate to contact us at [phone]      Please remember to call and schedule a follow up appointment if one was recommended at your earliest convenience.   PODIATRY CLINIC HOURS  TELEPHONE NUMBER    Dr. Ruben KEENPTERESA FAC FAS    Clinics:  [facility]        Risa Ford MA  Medical Assistant  Tuesday 1PM-6PM  [facility]  Wednesday 7AM-2PM  [place]  Thursday 10AM-6PM  [place]  Friday 7AM-345PM  [place]  Specialty schedulers:   [phone] to make an appointment with any Specialty Provider.        Urgent Care locations:    [facility] Monday-Friday 5 pm - 9 pm. Saturday-Sunday 9 am -5pm    Monday-Friday 11 am - 9 pm Saturday 9 am - 5 pm     Monday-Sunday 12 noon-8PM [phone]     If you need a medication refill, please contact us you may need lab work and/or a follow up visit prior to your refill (i.e. Antifungal medications).    Transmit Promohart (secure e-mail communication and access to your chart) to send a message or to make an appointment.    If MRI needed please call Kobi Conklin at [phone]        Weight management plan: Patient was referred to their PCP to discuss a diet and exercise plan.            Follow-ups after your visit        Who to contact     If you have questions or need follow up information about today's clinic visit or your schedule please contact [facility] directly at [phone].  Normal or non-critical lab and imaging results will be  "communicated to you by [employer], letter or phone within 4 business days after the clinic has received the results. If you do not hear from us within 7 days, please contact the clinic through Imaging3 or phone. If you have a critical or abnormal lab result, we will notify you by phone as soon as possible.  Submit refill requests through Imaging3 or call your pharmacy and they will forward the refill request to us. Please allow 3 business days for your refill to be completed.          Additional Information About Your Visit        Imaging3 Information     Imaging3 lets you send messages to your doctor, view your test results, renew your prescriptions, schedule appointments and more. To sign up, go to [email or web address] . Click on \"Log in\" on the left side of the screen, which will take you to the Welcome page. Then click on \"Sign up Now\" on the right side of the page.     You will be asked to enter the access code listed below, as well as some personal information. Please follow the directions to create your username and password.     Your access code is: [email or web address]  Expires: 2017  6:58 PM     Your access code will  in 90 days. If you need help or a new code, please call your [place] clinic or [phone].        Care EveryWhere ID     This is your Care EveryWhere ID. This could be used by other organizations to access your [place] medical records  [id number]        Your Vitals Were     Respirations Height BMI (Body Mass Index)             16 1.715 m (5' 7.5\") 35.34 kg/m2          Blood Pressure from Last 3 Encounters:   17 118/84   17 132/86   17 120/78    Weight from Last 3 Encounters:   17 103.9 kg (229 lb)   17 104.3 kg (229 lb 14.4 oz)   17 103.4 kg (228 lb)              Today, you had the following     No orders found for display       Primary Care Provider Office Phone # Fax #    Nahomi Castañeda [phone] [address] [place] [address]        Equal Access to Services     [facility] : Hadii dana roland jhoana Pena, waaxda luqadaha, qaybta kaalmada neydajohannajanet, waxellie kenisha vargasmelgloria farmer. So [facility] [phone].    ATENCIÓN: Si habla español, tiene a melgar disposición servicios gratuitos de asistencia lingüística. Haydename al [phone].    We comply with applicable federal civil rights laws and [place] laws. We do not discriminate on the basis of race, color, national origin, age, disability sex, sexual orientation or gender identity.            Thank you!     Thank you for choosing [facility]  for your care. Our goal is always to provide you with excellent care. Hearing back from our patients is one way we can continue to improve our services. Please take a few minutes to complete the written survey that you may receive in the mail after your visit with us. Thank you!             Your Updated Medication List - Protect others around you: Learn how to safely use, store and throw away your medicines at www.disposemymeds.org.          This list is accurate as of: 8/24/17  5:25 PM.  Always use your most recent med list.                   Brand Name Dispense Instructions for use Diagnosis    cyclobenzaprine 10 MG tablet    FLEXERIL    30 tablet    Take 0.5-1 tablets (5-10 mg) by mouth nightly as needed for muscle spasms    Acute right-sided low back pain without sciatica       diclofenac 75 MG EC tablet    VOLTAREN    30 tablet    Take 1 tablet (75 mg) by mouth 2 times daily as needed for moderate pain    Patellofemoral joint pain, right, Acute right-sided low back pain without sciatica       ferrous sulfate 325 (65 FE) MG tablet    IRON    60 tablet    Take 1 tablet (325 mg) by mouth 2 times daily    Iron deficiency anemia       levothyroxine 137 MCG tablet    SYNTHROID/LEVOTHROID    90 tablet    TAKE 1 TABLET (137 MCG) BY MOUTH DAILY    Hypothyroidism following radioiodine therapy       MULTI-VITAMIN PO      Take  by  mouth daily.        order for DME     1 Device    by * route continuous Neoprene knee sleeve - use as instructed    Patellofemoral joint pain, right       traZODone 50 MG tablet    DESYREL    30 tablet    TAKE 1 TABLET (50 MG) BY MOUTH AT BEDTIME    Insomnia, unspecified

## 2017-09-16 DIAGNOSIS — M54.50 ACUTE RIGHT-SIDED LOW BACK PAIN WITHOUT SCIATICA: ICD-10-CM

## 2017-09-16 DIAGNOSIS — M25.561 PATELLOFEMORAL JOINT PAIN, RIGHT: ICD-10-CM

## 2017-09-18 RX ORDER — DICLOFENAC SODIUM 75 MG/1
TABLET, DELAYED RELEASE ORAL
Qty: 30 TABLET | Refills: 1 | Status: SHIPPED | OUTPATIENT
Start: 2017-09-18 | End: 2017-10-12

## 2017-09-18 NOTE — TELEPHONE ENCOUNTER
diclofenac (VOLTAREN) 75 MG EC tablet      Last Written Prescription Date: 5/31/2017  Last Quantity: 30, # refills: 1  Last Office Visit with G, P or [facility] prescribing provider: 6/14/2017       Creatinine   Date Value Ref Range Status   11/13/2013 0.87 0.52 - 1.04 mg/dL Final     Lab Results   Component Value Date    AST 16 11/13/2013     Lab Results   Component Value Date    ALT 18 11/13/2013     BP Readings from Last 3 Encounters:   06/14/17 118/84   05/31/17 132/86   01/20/17 120/78

## 2017-09-18 NOTE — TELEPHONE ENCOUNTER
Routing refill request to provider for review/approval because:  Labs not current:  Cr, AST/ALT,     Erinn Beard RN

## 2017-10-12 ENCOUNTER — OFFICE VISIT (OUTPATIENT)
Dept: FAMILY MEDICINE | Facility: CLINIC | Age: 47
End: 2017-10-12
Payer: COMMERCIAL

## 2017-10-12 VITALS
TEMPERATURE: 97.4 F | SYSTOLIC BLOOD PRESSURE: 112 MMHG | HEART RATE: 84 BPM | DIASTOLIC BLOOD PRESSURE: 80 MMHG | OXYGEN SATURATION: 100 % | BODY MASS INDEX: 34.4 KG/M2 | HEIGHT: 68 IN | WEIGHT: 227 LBS

## 2017-10-12 DIAGNOSIS — H10.30 ACUTE CONJUNCTIVITIS, UNSPECIFIED ACUTE CONJUNCTIVITIS TYPE, UNSPECIFIED LATERALITY: Primary | ICD-10-CM

## 2017-10-12 DIAGNOSIS — E89.0 HYPOTHYROIDISM FOLLOWING RADIOIODINE THERAPY: ICD-10-CM

## 2017-10-12 DIAGNOSIS — Z12.31 VISIT FOR SCREENING MAMMOGRAM: ICD-10-CM

## 2017-10-12 LAB
T4 FREE SERPL-MCNC: 1.14 NG/DL (ref 0.76–1.46)
TSH SERPL DL<=0.005 MIU/L-ACNC: 4.44 MU/L (ref 0.4–4)

## 2017-10-12 PROCEDURE — 84439 ASSAY OF FREE THYROXINE: CPT | Performed by: FAMILY MEDICINE

## 2017-10-12 PROCEDURE — 36415 COLL VENOUS BLD VENIPUNCTURE: CPT | Performed by: FAMILY MEDICINE

## 2017-10-12 PROCEDURE — 99213 OFFICE O/P EST LOW 20 MIN: CPT | Performed by: FAMILY MEDICINE

## 2017-10-12 PROCEDURE — 84443 ASSAY THYROID STIM HORMONE: CPT | Performed by: FAMILY MEDICINE

## 2017-10-12 RX ORDER — GENTAMICIN SULFATE 3 MG/ML
1 SOLUTION/ DROPS OPHTHALMIC EVERY 4 HOURS
Qty: 5 ML | Refills: 0 | Status: SHIPPED | OUTPATIENT
Start: 2017-10-12 | End: 2017-10-12

## 2017-10-12 RX ORDER — GENTAMICIN SULFATE 3 MG/ML
1 SOLUTION/ DROPS OPHTHALMIC EVERY 4 HOURS
Qty: 5 ML | Refills: 0 | Status: SHIPPED | OUTPATIENT
Start: 2017-10-12 | End: 2018-04-05

## 2017-10-12 NOTE — PROGRESS NOTES
"  SUBJECTIVE:   Liane Key is a 47 year old female who presents to clinic today for the following health issues:      Chief Complaint   Patient presents with     Eye Problem     woke up with red eyes, watering at 2am this morning     SUBJECTIVE:  Chief Complaint:   Chief Complaint   Patient presents with     Eye Problem     woke up with red eyes, watering at 2am this morning     History of Present Illness:  Liane Key is a 47 year old female who presents complaining of moderate both eyes discharge, mattering for 1 day(s).   Onset/timing: gradual.    Associated Signs and Symptoms: has had a Runny nose  No cough  Treatment measures tried include: flushed with water   Contact wearer : Yes    No Vision Problems    Past Medical History:   Diagnosis Date     Graves disease      Hypothyroidism following radioiodine therapy      Current Outpatient Prescriptions   Medication Sig Dispense Refill     cyclobenzaprine (FLEXERIL) 10 MG tablet Take 0.5-1 tablets (5-10 mg) by mouth nightly as needed for muscle spasms 30 tablet 1     levothyroxine (SYNTHROID/LEVOTHROID) 137 MCG tablet TAKE 1 TABLET (137 MCG) BY MOUTH DAILY 90 tablet 0     traZODone (DESYREL) 50 MG tablet TAKE 1 TABLET (50 MG) BY MOUTH AT BEDTIME 30 tablet 7     ferrous sulfate 325 (65 FE) MG tablet Take 1 tablet (325 mg) by mouth 2 times daily 60 tablet 5     Multiple Vitamin (MULTI-VITAMIN PO) Take  by mouth daily.          ROS:  CONSTITUTIONAL:NEGATIVE for fever, chills, change in weight  INTEGUMENTARY/SKIN: NEGATIVE for worrisome rashes, moles or lesions  ENT/MOUTH: nasal congestion  RESP:NEGATIVE for significant cough or SOB  CV: NEGATIVE for chest pain, palpitations or peripheral edema    OBJECTIVE:  /80 (BP Location: Left arm, Patient Position: Chair, Cuff Size: Adult Large)  Pulse 84  Temp 97.4  F (36.3  C) (Oral)  Ht 5' 7.5\" (1.715 m)  Wt 227 lb (103 kg)  SpO2 100%  BMI 35.03 kg/m2  General: no acute distress  Eye exam: right eye abnormal " findings: conjunctivitis with erythema  mild, left eye abnormal findings: conjunctivitis with erythema.  Pt has her contacts on  Nose: NORMAL - no drainage, turbinates normal in size.  Neck: supple, non-tender, free range of motion, no adenopathy  Heart: NORMAL - regular rate and rhythm without murmur.  Lungs: normal and clear to auscultation    ASSESSMENT:    ICD-10-CM    1. Acute conjunctivitis, unspecified acute conjunctivitis type, unspecified laterality H10.30 gentamicin (GARAMYCIN) 0.3 % ophthalmic solution   2. Visit for screening mammogram Z12.31 MA SCREENING DIGITAL BILAT - Future  (s+30)   3. Hypothyroidism following radioiodine therapy E89.0 TSH WITH FREE T4 REFLEX       SEE [facility] care orders  The potential side effects of this medication have been discussed with the patient.  Call if any significant problems with these are experienced.  Do not wear contacts  Follow up Opthmology 2-3 days if not better-sooner if worse  Nahomi Castañeda MD

## 2017-10-12 NOTE — LETTER
[facility]  [address] [place] [address]    October 13, 2017    Liane Key  [address]  [place] [address]          Dear Liane,    Thyroid test is Borderline   Continue same medicines    Enclosed is a copy of your results.     Results for orders placed or performed in visit on 10/12/17   TSH WITH FREE T4 REFLEX   Result Value Ref Range    TSH 4.44 (H) 0.40 - 4.00 mU/L   T4 free   Result Value Ref Range    T4 Free 1.14 0.76 - 1.46 ng/dL       If you have any questions or concerns, please call myself or my nurse at [phone].      Sincerely,        Nahomi Castañeda MD/SHONA

## 2017-10-12 NOTE — NURSING NOTE
"Chief Complaint   Patient presents with     Eye Problem     woke up with red eyes, watering at 2am this morning       Initial /80 (BP Location: Left arm, Patient Position: Chair, Cuff Size: Adult Large)  Pulse 84  Temp 97.4  F (36.3  C) (Oral)  Ht 5' 7.5\" (1.715 m)  Wt 227 lb (103 kg)  SpO2 100%  BMI 35.03 kg/m2 Estimated body mass index is 35.03 kg/(m^2) as calculated from the following:    Height as of this encounter: 5' 7.5\" (1.715 m).    Weight as of this encounter: 227 lb (103 kg).  Medication Reconciliation: complete    "

## 2017-10-12 NOTE — PATIENT INSTRUCTIONS
[facility]    If you have any questions regarding to your visit please contact your care team:       Team Red:   Clinic Hours Telephone Number   Dr. Glenis Gusman, NP   7am-7pm  Monday - Thursday   7am-5pm  Fridays  [phone]  (Appointment scheduling available 24/7)    Questions about your visit?   Team Line  [phone]   [facility] [place] [facility] 11am-9pm Monday-Friday Saturday-Sunday- 9am-5pm   [place] - 5pm-9pm Monday-Friday Saturday-Sunday- 9am-5pm  [phone] - [place]   [phone] - [place]       What options do I have for visits at the clinic other than the traditional office visit?  To expand how we care for you, many of our providers are utilizing electronic visits (e-visits) and telephone visits, when medically appropriate, for interactions with their patients rather than a visit in the clinic.   We also offer nurse visits for many medical concerns. Just like any other service, we will bill your insurance company for this type of visit based on time spent on the phone with your provider. Not all insurance companies cover these visits. Please check with your medical insurance if this type of visit is covered. You will be responsible for any charges that are not paid by your insurance.      E-visits via MyDoc:  generally incur a $35.00 fee.  Telephone visits:  Time spent on the phone: *charged based on time that is spent on the phone in increments of 10 minutes. Estimated cost:   5-10 mins $30.00   11-20 mins. $59.00   21-30 mins. $85.00     Use InternetVistat (secure email communication and access to your chart) to send your primary care provider a message or make an appointment. Ask someone on your Team how to sign up for MyDoc.  For a Price Quote for your services, please call our Consumer Price Line at [phone].      As always, Thank you for trusting us with your health care needs!    Discharged  by Melvi Biswas MA.

## 2017-10-12 NOTE — MR AVS SNAPSHOT
After Visit Summary   10/12/2017    Liane Key    MRN: [id number]           Patient Information     Date Of Birth          1970        Visit Information        Provider Department      10/12/2017 2:40 PM Nahomi Castañeda MD [facility]        Today's Diagnoses     Acute conjunctivitis, unspecified acute conjunctivitis type, unspecified laterality    -  1    Visit for screening mammogram        Hypothyroidism following radioiodine therapy          Care Instructions    [facility]    If you have any questions regarding to your visit please contact your care team:       Team Red:   Clinic Hours Telephone Number   Dr. Glenis Gusman, NP   7am-7pm  Monday - Thursday   7am-5pm  Fridays  [phone]  (Appointment scheduling available 24/7)    Questions about your visit?   Team Line  [phone]   Urgent Care - [place] [facility] - 11am-9pm Monday-Friday Saturday-Sunday- 9am-5pm   [place] - 5pm-9pm Monday-Friday Saturday-Sunday- 9am-5pm  [phone] - [facility]  [phone] - [place]       What options do I have for visits at the clinic other than the traditional office visit?  To expand how we care for you, many of our providers are utilizing electronic visits (e-visits) and telephone visits, when medically appropriate, for interactions with their patients rather than a visit in the clinic.   We also offer nurse visits for many medical concerns. Just like any other service, we will bill your insurance company for this type of visit based on time spent on the phone with your provider. Not all insurance companies cover these visits. Please check with your medical insurance if this type of visit is covered. You will be responsible for any charges that are not paid by your insurance.      E-visits via "[employer]:  generally incur a $35.00 fee.  Telephone visits:  Time spent on the phone: *charged based on  "time that is spent on the phone in increments of 10 minutes. Estimated cost:   5-10 mins $30.00   11-20 mins. $59.00   21-30 mins. $85.00     Use [employer] (secure email communication and access to your chart) to send your primary care provider a message or make an appointment. Ask someone on your Team how to sign up for [employer].  For a Price Quote for your services, please call our [employer] Line at [phone].      As always, Thank you for trusting us with your health care needs!    Discharged by Melvi Biswas MA.            Follow-ups after your visit        Future tests that were ordered for you today     Open Future Orders        Priority Expected Expires Ordered    MA SCREENING DIGITAL BILAT - Future  (s+30) Routine  10/12/2018 10/12/2017            Who to contact     If you have questions or need follow up information about today's clinic visit or your schedule please contact [facility] directly at [phone].  Normal or non-critical lab and imaging results will be communicated to you by [employer], letter or phone within 4 business days after the clinic has received the results. If you do not hear from us within 7 days, please contact the clinic through [employer] or phone. If you have a critical or abnormal lab result, we will notify you by phone as soon as possible.  Submit refill requests through Tyfone or call your pharmacy and they will forward the refill request to us. Please allow 3 business days for your refill to be completed.          Additional Information About Your Visit        Tyfone Information     Tyfone lets you send messages to your doctor, view your test results, renew your prescriptions, schedule appointments and more. To sign up, go to [email or web address] . Click on \"Log in\" on the left side of the screen, which will take you to the Welcome page. Then click on \"Sign up Now\" on the right side of the page.     You will be asked to enter the access code listed below, " "as well as some personal information. Please follow the directions to create your username and password.     Your access code is: [email or web address]  Expires: 1/10/2018  3:27 PM     Your access code will  in 90 days. If you need help or a new code, please call your [place] clinic or [phone].        Care EveryWhere ID     This is your Care EveryWhere ID. This could be used by other organizations to access your [place] medical records  [id number]        Your Vitals Were     Pulse Temperature Height Pulse Oximetry BMI (Body Mass Index)       84 97.4  F (36.3  C) (Oral) 5' 7.5\" (1.715 m) 100% 35.03 kg/m2        Blood Pressure from Last 3 Encounters:   10/12/17 112/80   17 118/84   17 132/86    Weight from Last 3 Encounters:   10/12/17 227 lb (103 kg)   17 229 lb (103.9 kg)   17 229 lb 14.4 oz (104.3 kg)              We Performed the Following     TSH WITH FREE T4 REFLEX          Today's Medication Changes          These changes are accurate as of: 10/12/17  3:27 PM.  If you have any questions, ask your nurse or doctor.               Start taking these medicines.        Dose/Directions    gentamicin 0.3 % ophthalmic solution   Commonly known as:  GARAMYCIN   Used for:  Acute conjunctivitis, unspecified acute conjunctivitis type, unspecified laterality   Started by:  Nahomi Castañeda MD        Dose:  1 drop   Apply 1 drop to eye every 4 hours   Quantity:  5 mL   Refills:  0            Where to get your medicines      These medications were sent to [facility]/pharmacy #[id number] - [place], [place] - [address] [place] [address], [place] [address]     Phone:  [phone]     gentamicin 0.3 % ophthalmic solution                Primary Care Provider Office Phone # Fax #    Nahomi Castañeda [phone]       [address] [place] [address]        Equal Access to Services     PAWEL QUACH AH: Shellie Pena, whitley luqadaha, qaybta " moses pablo tarikstephen fayeblane castellanos ah. Graciela [facility] [phone].    ATENCIÓN: Si beck parra, tiene a melgar disposición servicios gratuitos de asistencia lingüística. Coni al [phone].    We comply with applicable federal civil rights laws and [place] laws. We do not discriminate on the basis of race, color, national origin, age, disability, sex, sexual orientation, or gender identity.            Thank you!     Thank you for choosing [facility]  for your care. Our goal is always to provide you with excellent care. Hearing back from our patients is one way we can continue to improve our services. Please take a few minutes to complete the written survey that you may receive in the mail after your visit with us. Thank you!             Your Updated Medication List - Protect others around you: Learn how to safely use, store and throw away your medicines at www.disposemymeds.org.          This list is accurate as of: 10/12/17  3:27 PM.  Always use your most recent med list.                   Brand Name Dispense Instructions for use Diagnosis    cyclobenzaprine 10 MG tablet    FLEXERIL    30 tablet    Take 0.5-1 tablets (5-10 mg) by mouth nightly as needed for muscle spasms    Acute right-sided low back pain without sciatica       ferrous sulfate 325 (65 FE) MG tablet    IRON    60 tablet    Take 1 tablet (325 mg) by mouth 2 times daily    Iron deficiency anemia       gentamicin 0.3 % ophthalmic solution    GARAMYCIN    5 mL    Apply 1 drop to eye every 4 hours    Acute conjunctivitis, unspecified acute conjunctivitis type, unspecified laterality       levothyroxine 137 MCG tablet    SYNTHROID/LEVOTHROID    90 tablet    TAKE 1 TABLET (137 MCG) BY MOUTH DAILY    Hypothyroidism following radioiodine therapy       MULTI-VITAMIN PO      Take  by mouth daily.        traZODone 50 MG tablet    DESYREL    30 tablet    TAKE 1 TABLET (50 MG) BY MOUTH AT BEDTIME    Insomnia, unspecified

## 2017-10-23 ENCOUNTER — RADIANT APPOINTMENT (OUTPATIENT)
Dept: MAMMOGRAPHY | Facility: CLINIC | Age: 47
End: 2017-10-23
Attending: FAMILY MEDICINE
Payer: COMMERCIAL

## 2017-10-23 DIAGNOSIS — Z12.31 VISIT FOR SCREENING MAMMOGRAM: ICD-10-CM

## 2017-10-23 PROCEDURE — G0202 SCR MAMMO BI INCL CAD: HCPCS | Mod: TC

## 2017-11-17 ENCOUNTER — TELEPHONE (OUTPATIENT)
Dept: FAMILY MEDICINE | Facility: CLINIC | Age: 47
End: 2017-11-17

## 2017-11-17 NOTE — TELEPHONE ENCOUNTER
Reason for Call:  Question    Detailed comments: Patient states she received a letter to have liver panel done. She thinks she has already done this. Please call to advise. Thank you.    Phone Number Patient can be reached at: Home number on file [phone] (home)    Best Time: any    Can we leave a detailed message on this number? YES    Call taken on 11/17/2017 at 9:41 AM by Nadege Briceno

## 2018-04-02 NOTE — PROGRESS NOTES
SUBJECTIVE:   Liane Key is a 47 year old female who presents to clinic today for the following health issues:      Patient presents with:  Pain: Left foot, goes into leg x 2 weeks  Health Maintenance: PAP, Eye Exam              Problem list and histories reviewed & adjusted, as indicated.  Additional history: as documented    Patient Active Problem List   Diagnosis     CARDIOVASCULAR SCREENING; LDL GOAL LESS THAN 160     Graves disease     Hypothyroidism following radioiodine therapy     Syphilis     Iron deficiency anemia, unspecified iron deficiency anemia type     Past Surgical History:   Procedure Laterality Date     ANKLE SURGERY  1990    left- s/p ORIF     SURGICAL HISTORY OF -   1998    removal of gallstones     TUBAL LIGATION         Social History   Substance Use Topics     Smoking status: Former Smoker     Packs/day: 0.20     Years: 5.00     Types: Cigarettes     Quit date: 5/30/2012     Smokeless tobacco: Never Used     Alcohol use Yes      Comment: occasional      Family History   Problem Relation Age of Onset     Cancer - colorectal Mother      Unknown/Adopted Maternal Grandmother      Unknown/Adopted Maternal Grandfather      Unknown/Adopted Paternal Grandmother      Unknown/Adopted Paternal Grandfather      Depression Sister      Thyroid Disease Maternal Aunt      CANCER No family hx of      DIABETES No family hx of      Hypertension No family hx of      CEREBROVASCULAR DISEASE No family hx of      Glaucoma No family hx of      Macular Degeneration No family hx of          Current Outpatient Prescriptions   Medication Sig Dispense Refill     Prenatal MV-Min-Fe Fum-FA-DHA (PRENATAL 1 PO) Take 1 tablet by mouth daily       levothyroxine (SYNTHROID/LEVOTHROID) 137 MCG tablet TAKE 1 TABLET (137 MCG) BY MOUTH DAILY 90 tablet 3     traZODone (DESYREL) 50 MG tablet TAKE 1 TABLET (50 MG) BY MOUTH AT BEDTIME 30 tablet 7     Multiple Vitamin (MULTI-VITAMIN PO) Take  by mouth daily.       BP Readings from  "Last 3 Encounters:   04/05/18 120/70   10/12/17 112/80   06/14/17 118/84    Wt Readings from Last 3 Encounters:   04/05/18 220 lb 12.8 oz (100.2 kg)   10/12/17 227 lb (103 kg)   08/24/17 229 lb (103.9 kg)                  Labs reviewed in EPIC    Reviewed and updated as needed this visit by clinical staff  Tobacco  Allergies  Meds  Med Hx  Surg Hx  Fam Hx  Soc Hx      Reviewed and updated as needed this visit by Provider  Med Hx         ROS:  This 47 year old female is here today because for the past 2 weeks she has felt pain in her left foot for no reason and then it shoots up into her left leg. This happened in her right foot and leg in the past and then resolved. She is hypothyroid and TSH is normal. She is a former smoker. Has had ORIF of left ankle in 1990. 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 Pain can even occur when she is lying in bed. Has long history of anemia. Is supposed to take iron pills, but is not very faithful. Her feet are always very cold. Has to sleep with socks on and still her feet are cold. Her hands are not that way.  She has had tubal ligation. Admits that she has very heavy menses. Lately, has been getting her menses Q 2 weeks. Seems like she is bleeding all the time.  All other review of systems are negative  Personal, family, and social history reviewed with patient and revised.         OBJECTIVE:     /70  Pulse 92  Temp 98.7  F (37.1  C) (Oral)  Resp 22  Ht 5' 7.56\" (1.716 m)  Wt 220 lb 12.8 oz (100.2 kg)  SpO2 100%  BMI 34.01 kg/m2  Body mass index is 34.01 kg/(m^2).  GENERAL: healthy, alert and no distress  NECK: no adenopathy, no asymmetry, masses, or scars and thyroid normal to palpation  RESP: lungs clear to auscultation - no rales, rhonchi or wheezes  CV: regular rate and rhythm, normal S1 S2, no S3 or S4, no murmur, click or rub, no peripheral edema and peripheral pulses strong  MS: no gross musculoskeletal defects noted, no " edema  But her feet are very cold, especially her toes. She has nail polish on her nails and she is  so hard to tell if she is still anemic.   No joint swelling or tenderness   Gait is normal   Not tender is sacroiliac joints.     Diagnostic Test Results:  Results for orders placed or performed in visit on 04/05/18 (from the past 24 hour(s))   CBC with platelets differential   Result Value Ref Range    WBC 7.2 4.0 - 11.0 10e9/L    RBC Count 3.90 3.8 - 5.2 10e12/L    Hemoglobin 9.2 (L) 11.7 - 15.7 g/dL    Hematocrit 29.7 (L) 35.0 - 47.0 %    MCV 76 (L) 78 - 100 fl    MCH 23.6 (L) 26.5 - 33.0 pg    MCHC 31.0 (L) 31.5 - 36.5 g/dL    RDW 17.5 (H) 10.0 - 15.0 %    Platelet Count 334 150 - 450 10e9/L    Diff Method Automated Method     % Neutrophils 60.0 %    % Lymphocytes 26.0 %    % Monocytes 12.4 %    % Eosinophils 1.0 %    % Basophils 0.6 %    Absolute Neutrophil 4.3 1.6 - 8.3 10e9/L    Absolute Lymphocytes 1.9 0.8 - 5.3 10e9/L    Absolute Monocytes 0.9 0.0 - 1.3 10e9/L    Absolute Eosinophils 0.1 0.0 - 0.7 10e9/L    Absolute Basophils 0.0 0.0 - 0.2 10e9/L   Glucose whole blood   Result Value Ref Range    Glucose Whole Blood 99 70 - 99 mg/dL   Erythrocyte sedimentation rate auto   Result Value Ref Range    Sed Rate 29 (H) 0 - 20 mm/h       ASSESSMENT/PLAN:              1. Idiopathic peripheral neuropathy  As above, this is due to her severe anemia   - CBC with platelets differential  - Glucose whole blood    2. Bilateral cold feet  As above, this is due to her severe anemia   - CBC with platelets differential  - Erythrocyte sedimentation rate auto  - CRP inflammation    3. Iron deficiency anemia, unspecified iron deficiency anemia type  As above, she needs to take iron pills over the counter BID with vitamin C  - OB/GYN REFERRAL    4. Excessive or frequent menstruation  As above. She will discuss with GYN regarding a permanent fix for her menorrhagia. Interested in uterine ablation. I forgot to mention  the option of mirena IUD. In the meantime, she will start high estrogen pill to slow her menorrhagia.   - norethindrone-ethinyl estradiol (ORTHO-NOVUM 1-35 TAB,NORTREL 1-35 TAB) 1-35 MG-MCG per tablet; Take 1 tablet by mouth daily  Dispense: 84 tablet; Refill: 4  - OB/GYN REFERRAL    Return to clinic if no improvement     BENTON SURESH MD  [facility]

## 2018-04-05 ENCOUNTER — OFFICE VISIT (OUTPATIENT)
Dept: FAMILY MEDICINE | Facility: CLINIC | Age: 48
End: 2018-04-05
Payer: COMMERCIAL

## 2018-04-05 VITALS
RESPIRATION RATE: 22 BRPM | HEART RATE: 92 BPM | TEMPERATURE: 98.7 F | HEIGHT: 68 IN | DIASTOLIC BLOOD PRESSURE: 70 MMHG | OXYGEN SATURATION: 100 % | BODY MASS INDEX: 33.46 KG/M2 | SYSTOLIC BLOOD PRESSURE: 120 MMHG | WEIGHT: 220.8 LBS

## 2018-04-05 DIAGNOSIS — G60.9 IDIOPATHIC PERIPHERAL NEUROPATHY: Primary | ICD-10-CM

## 2018-04-05 DIAGNOSIS — R20.9 BILATERAL COLD FEET: ICD-10-CM

## 2018-04-05 DIAGNOSIS — D50.9 IRON DEFICIENCY ANEMIA, UNSPECIFIED IRON DEFICIENCY ANEMIA TYPE: ICD-10-CM

## 2018-04-05 DIAGNOSIS — N92.0 EXCESSIVE OR FREQUENT MENSTRUATION: ICD-10-CM

## 2018-04-05 LAB
BASOPHILS # BLD AUTO: 0 10E9/L (ref 0–0.2)
BASOPHILS NFR BLD AUTO: 0.6 %
DIFFERENTIAL METHOD BLD: ABNORMAL
EOSINOPHIL # BLD AUTO: 0.1 10E9/L (ref 0–0.7)
EOSINOPHIL NFR BLD AUTO: 1 %
ERYTHROCYTE [DISTWIDTH] IN BLOOD BY AUTOMATED COUNT: 17.5 % (ref 10–15)
ERYTHROCYTE [SEDIMENTATION RATE] IN BLOOD BY WESTERGREN METHOD: 29 MM/H (ref 0–20)
GLUCOSE BLD-MCNC: 99 MG/DL (ref 70–99)
HCT VFR BLD AUTO: 29.7 % (ref 35–47)
HGB BLD-MCNC: 9.2 G/DL (ref 11.7–15.7)
LYMPHOCYTES # BLD AUTO: 1.9 10E9/L (ref 0.8–5.3)
LYMPHOCYTES NFR BLD AUTO: 26 %
MCH RBC QN AUTO: 23.6 PG (ref 26.5–33)
MCHC RBC AUTO-ENTMCNC: 31 G/DL (ref 31.5–36.5)
MCV RBC AUTO: 76 FL (ref 78–100)
MONOCYTES # BLD AUTO: 0.9 10E9/L (ref 0–1.3)
MONOCYTES NFR BLD AUTO: 12.4 %
NEUTROPHILS # BLD AUTO: 4.3 10E9/L (ref 1.6–8.3)
NEUTROPHILS NFR BLD AUTO: 60 %
PLATELET # BLD AUTO: 334 10E9/L (ref 150–450)
RBC # BLD AUTO: 3.9 10E12/L (ref 3.8–5.2)
WBC # BLD AUTO: 7.2 10E9/L (ref 4–11)

## 2018-04-05 PROCEDURE — 82947 ASSAY GLUCOSE BLOOD QUANT: CPT | Performed by: FAMILY MEDICINE

## 2018-04-05 PROCEDURE — 99214 OFFICE O/P EST MOD 30 MIN: CPT | Performed by: FAMILY MEDICINE

## 2018-04-05 PROCEDURE — 85652 RBC SED RATE AUTOMATED: CPT | Performed by: FAMILY MEDICINE

## 2018-04-05 PROCEDURE — 86140 C-REACTIVE PROTEIN: CPT | Performed by: FAMILY MEDICINE

## 2018-04-05 PROCEDURE — 85025 COMPLETE CBC W/AUTO DIFF WBC: CPT | Performed by: FAMILY MEDICINE

## 2018-04-05 PROCEDURE — 36415 COLL VENOUS BLD VENIPUNCTURE: CPT | Performed by: FAMILY MEDICINE

## 2018-04-05 NOTE — MR AVS SNAPSHOT
After Visit Summary   4/5/2018    Liane Key    MRN: [id number]           Patient Information     Date Of Birth          1970        Visit Information        Provider Department      4/5/2018 6:00 PM Mamie Stephens MD [facility]        Today's Diagnoses     Idiopathic peripheral neuropathy    -  1    Bilateral cold feet        Iron deficiency anemia, unspecified iron deficiency anemia type        Excessive or frequent menstruation          Care Instructions    [facility]    If you have any questions regarding to your visit please contact your care team:       Team Purple:   Clinic Hours Telephone Number   Dr. Mamie Killian   7am-7pm  Monday - Thursday   7am-5pm  Fridays  [phone]  (Appointment scheduling available 24/7)    Questions about your Visit?   Team Line:  [phone]   Urgent Care - [place] [facility] - 11am-9pm Monday-Friday Saturday-Sunday- 9am-5pm   [place] - 5pm-9pm Monday-Friday Saturday-Sunday- 9am-5pm  [phone] - [facility]  [phone] - [place]       What options do I have for visits at the clinic other than the traditional office visit?  To expand how we care for you, many of our providers are utilizing electronic visits (e-visits) and telephone visits, when medically appropriate, for interactions with their patients rather than a visit in the clinic.   We also offer nurse visits for many medical concerns. Just like any other service, we will bill your insurance company for this type of visit based on time spent on the phone with your provider. Not all insurance companies cover these visits. Please check with your medical insurance if this type of visit is covered. You will be responsible for any charges that are not paid by your insurance.      E-visits via ACSIAN:  generally incur a $35.00 fee.  Telephone visits:  Time spent on the phone:  *charged based on time that is spent on the phone in increments of 10 minutes. Estimated cost:   5-10 mins $30.00   11-20 mins. $59.00   21-30 mins. $85.00     Use [email or web address] (secure email communication and access to your chart) to send your primary care provider a message or make an appointment. Ask someone on your Team how to sign up for Sanguinet.  For a Price Quote for your services, please call our GnamGnam Line at [phone].  As always, Thank you for trusting us with your health care needs!              Follow-ups after your visit        Additional Services     OB/GYN REFERRAL       Your provider has referred you to:  [facility]: [facility] [phone]   [email or web address]    Please be aware that coverage of these services is subject to the terms and limitations of your health insurance plan.  Call member services at your health plan with any benefit or coverage questions.      Please bring the following to your appointment:  >>   Any x-rays, CTs or MRIs which have been performed.  Contact the facility where they were done to arrange for  prior to your scheduled appointment.  Any new CT, MRI or other procedures ordered by your specialist must be performed at a [facility] facility or coordinated by your clinic's referral office.    >>   List of current medications   >>   This referral request   >>   Any documents/labs given to you for this referral                  Who to contact     If you have questions or need follow up information about today's clinic visit or your schedule please contact [facility] directly at [phone].  Normal or non-critical lab and imaging results will be communicated to you by MyChart, letter or phone within 4 business days after the clinic has received the results. If you do not hear from us within 7 days, please contact the clinic through MyChart or phone. If you have a critical or abnormal lab result, we will notify  "you by phone as soon as possible.  Submit refill requests through ActiveEon or call your pharmacy and they will forward the refill request to us. Please allow 3 business days for your refill to be completed.          Additional Information About Your Visit        Care EveryWhere ID     This is your Care EveryWhere ID. This could be used by other organizations to access your [facility] medical records  [id number]        Your Vitals Were     Pulse Temperature Respirations Height Pulse Oximetry BMI (Body Mass Index)    92 98.7  F (37.1  C) (Oral) 22 5' 7.56\" (1.716 m) 100% 34.01 kg/m2       Blood Pressure from Last 3 Encounters:   04/05/18 120/70   10/12/17 112/80   06/14/17 118/84    Weight from Last 3 Encounters:   04/05/18 220 lb 12.8 oz (100.2 kg)   10/12/17 227 lb (103 kg)   08/24/17 229 lb (103.9 kg)              We Performed the Following     CBC with platelets differential     CRP inflammation     Erythrocyte sedimentation rate auto     Glucose whole blood     OB/GYN REFERRAL          Today's Medication Changes          These changes are accurate as of 4/5/18  7:06 PM.  If you have any questions, ask your nurse or doctor.               Start taking these medicines.        Dose/Directions    norethindrone-ethinyl estradiol 1-35 MG-MCG per tablet   Commonly known as:  ORTHO-NOVUM 1-35 TAB,NORTREL 1-35 TAB   Used for:  Excessive or frequent menstruation   Started by:  Mamie Stephens MD        Dose:  1 tablet   Take 1 tablet by mouth daily   Quantity:  84 tablet   Refills:  4            Where to get your medicines      These medications were sent to [facility] - [address]  [address], [place] [address]     Phone:  [phone]     norethindrone-ethinyl estradiol 1-35 MG-MCG per tablet                Primary Care Provider Office Phone # Fax #    Nahomi Castañeda [phone] [address]  [place] [address]        Equal " Access to Services     [facility]: Hadii aad ku hadramyagutierrez Ezali, waluis enriqueda luqadaha, qaybta kamaria esthermoses mckeon. So [facility] [phone].    ATENCIÓN: Si habla aida, tiene a melgar disposición servicios gratuitos de asistencia lingüística. Llame al [phone].    We comply with applicable federal civil rights laws and [place] laws. We do not discriminate on the basis of race, color, national origin, age, disability, sex, sexual orientation, or gender identity.            Thank you!     Thank you for choosing [facility]  for your care. Our goal is always to provide you with excellent care. Hearing back from our patients is one way we can continue to improve our services. Please take a few minutes to complete the written survey that you may receive in the mail after your visit with us. Thank you!             Your Updated Medication List - Protect others around you: Learn how to safely use, store and throw away your medicines at www.disposemymeds.org.          This list is accurate as of 4/5/18  7:06 PM.  Always use your most recent med list.                   Brand Name Dispense Instructions for use Diagnosis    levothyroxine 137 MCG tablet    SYNTHROID/LEVOTHROID    90 tablet    TAKE 1 TABLET (137 MCG) BY MOUTH DAILY    Hypothyroidism following radioiodine therapy       MULTI-VITAMIN PO      Take  by mouth daily.        norethindrone-ethinyl estradiol 1-35 MG-MCG per tablet    ORTHO-NOVUM 1-35 TAB,NORTREL 1-35 TAB    84 tablet    Take 1 tablet by mouth daily    Excessive or frequent menstruation       PRENATAL 1 PO      Take 1 tablet by mouth daily        traZODone 50 MG tablet    DESYREL    30 tablet    TAKE 1 TABLET (50 MG) BY MOUTH AT BEDTIME    Insomnia, unspecified

## 2018-04-06 LAB — CRP SERPL-MCNC: <2.9 MG/L (ref 0–8)

## 2018-04-06 NOTE — PATIENT INSTRUCTIONS
[facility]    If you have any questions regarding to your visit please contact your care team:       Team Purple:   Clinic Hours Telephone Number   Dr. Mamie Killian   7am-7pm  Monday - Thursday   7am-5pm  Fridays  [phone]  (Appointment scheduling available 24/7)    Questions about your Visit?   Team Line:  [phone]   Urgent Care - [place] [facility] - 11am-9pm Monday-Friday Saturday-Sunday- 9am-5pm   [place] - 5pm-9pm Monday-Friday Saturday-Sunday- 9am-5pm  [phone] - [facility]  [phone] - [place]       What options do I have for visits at the clinic other than the traditional office visit?  To expand how we care for you, many of our providers are utilizing electronic visits (e-visits) and telephone visits, when medically appropriate, for interactions with their patients rather than a visit in the clinic.   We also offer nurse visits for many medical concerns. Just like any other service, we will bill your insurance company for this type of visit based on time spent on the phone with your provider. Not all insurance companies cover these visits. Please check with your medical insurance if this type of visit is covered. You will be responsible for any charges that are not paid by your insurance.      E-visits via Glu Mobile:  generally incur a $35.00 fee.  Telephone visits:  Time spent on the phone: *charged based on time that is spent on the phone in increments of 10 minutes. Estimated cost:   5-10 mins $30.00   11-20 mins. $59.00   21-30 mins. $85.00     Use Unirisxhart (secure email communication and access to your chart) to send your primary care provider a message or make an appointment. Ask someone on your Team how to sign up for Glu Mobile.  For a Price Quote for your services, please call our Consumer Price Line at [phone].  As always, Thank you for trusting us with your health care needs!

## 2018-04-25 ENCOUNTER — OFFICE VISIT (OUTPATIENT)
Dept: OBGYN | Facility: CLINIC | Age: 48
End: 2018-04-25
Payer: COMMERCIAL

## 2018-04-25 VITALS
SYSTOLIC BLOOD PRESSURE: 129 MMHG | BODY MASS INDEX: 32.84 KG/M2 | WEIGHT: 213.2 LBS | OXYGEN SATURATION: 99 % | DIASTOLIC BLOOD PRESSURE: 83 MMHG | HEART RATE: 83 BPM

## 2018-04-25 DIAGNOSIS — N93.9 ABNORMAL UTERINE BLEEDING: Primary | ICD-10-CM

## 2018-04-25 DIAGNOSIS — D50.9 IRON DEFICIENCY ANEMIA, UNSPECIFIED IRON DEFICIENCY ANEMIA TYPE: ICD-10-CM

## 2018-04-25 PROCEDURE — 99203 OFFICE O/P NEW LOW 30 MIN: CPT | Performed by: OBSTETRICS & GYNECOLOGY

## 2018-04-25 RX ORDER — FERROUS SULFATE 325(65) MG
TABLET ORAL
COMMUNITY
End: 2022-07-21

## 2018-04-25 NOTE — PROGRESS NOTES
"Liane is a 47 year old  referred here by Dr. Stephens with complaint of abnormal uterine bleeding.   She has had abnormal bleeding for about the past 4 years.  She has noted that her menses are about 5-6 days and then stops and then she has a few days of spotting.  She thinks that she is bleeding about half the month.    It has interfered with her daily activities.  It has limited her ability to leave the house for fear of bleeding through paper products and through her clothes.  She has not lost work time due to the bleeding.    She changes the pads every 1.5 to 2 hours.  She uses a tampon and a pad and a Depends day and night.    Laying down helps.  When she stands up \"it feels like my whole insides are coming out.\"   She hasn't had dizziness, but she has had some light headedness.  She has had fatigued.  She has had episodes of being hot and cold.  She has not had any shortness of breath, no chest pain.    She had an ultrasound 3.5 years ago when I saw her previously, and she had a small fibroid adjacent to the endometrium.    She has been taking Fe supplements off and on for the anemia.  She recently started OCPs but she hasn't noticed any difference.      Component      Latest Ref Rng & Units 8/15/2016 10/12/2017 2018   WBC      4.0 - 11.0 10e9/L 4.4  7.2   RBC Count      3.8 - 5.2 10e12/L 3.65 (L)  3.90   Hemoglobin      11.7 - 15.7 g/dL 11.2 (L)  9.2 (L)   Hematocrit      35.0 - 47.0 % 33.9 (L)  29.7 (L)   Platelet Count      150 - 450 10e9/L 257  334   TSH      0.40 - 4.00 mU/L 0.72 4.44 (H)    T4 Free      0.76 - 1.46 ng/dL  1.14        Her last ultrasound 3.5 years ago showed a small fibroid.      ULTRASOUND PELVIS WITH TRANSVAGINAL IMAGING 2014 4:10 PM  HISTORY: Excessive or frequent menstruation.  COMPARISON: None.  FINDINGS: Transvaginal images were performed to better evaluate the patient's uterus, ovaries and endometrial stripe.  1.6 cm fibroid abutting the myometrium in the " fundus of the uterus.  The uterus is otherwise unremarkable. Endometrial stripe measures 9 mm and is normal for patient's age and menstrual status. The right ovary is normal. The left ovary is normal. The ovaries demonstrate normal color Doppler flow bilaterally. No adnexal masses are present. No free pelvic fluid is present.  IMPRESSION: Small fundal fibroid, possibly with a small submucosal component. Otherwise negative pelvic ultrasound.      Past Medical History:   Diagnosis Date     Graves disease      Hypothyroidism following radioiodine therapy        Past Surgical History:   Procedure Laterality Date     ANKLE SURGERY      left- s/p ORIF     SURGICAL HISTORY OF -       removal of gallstones     TUBAL LIGATION         Obstetric History       T0      L4     SAB0   TAB0   Ectopic0   Multiple0   Live Births4       # Outcome Date GA Lbr Bernabe/2nd Weight Sex Delivery Anes PTL Lv   4 Para            3 Para            2 Para            1 Para                   Gynecological History         Patient's last menstrual period was 2018.     No STD/no PID/no IUD      see above HPI        No Known Allergies      Current Outpatient Prescriptions   Medication Sig Dispense Refill     ferrous sulfate (IRON) 325 (65 Fe) MG tablet Take by mouth daily (with breakfast)       levothyroxine (SYNTHROID/LEVOTHROID) 137 MCG tablet TAKE 1 TABLET (137 MCG) BY MOUTH DAILY 90 tablet 3     norethindrone-ethinyl estradiol (ORTHO-NOVUM 1-35 TAB,NORTREL 1-35 TAB) 1-35 MG-MCG per tablet Take 1 tablet by mouth daily 84 tablet 4     Prenatal MV-Min-Fe Fum-FA-DHA (PRENATAL 1 PO) Take 1 tablet by mouth daily       traZODone (DESYREL) 50 MG tablet TAKE 1 TABLET (50 MG) BY MOUTH AT BEDTIME (Patient not taking: Reported on 2018) 30 tablet 7       Social History     Social History     Marital status: Single     Spouse name: N/A     Number of children: 4     Years of education: N/A     Occupational History       [employer]     Social History Main Topics     Smoking status: Former Smoker     Packs/day: 0.20     Years: 5.00     Types: Cigarettes     Quit date: 5/30/2012     Smokeless tobacco: Never Used     Alcohol use Yes      Comment: occasional      Drug use: Yes      Comment: mariann - smokes it for appetite      Sexual activity: Yes     Partners: Male     Birth control/ protection: Condom, Surgical     Other Topics Concern     Parent/Sibling W/ Cabg, Mi Or Angioplasty Before 65f 55m? No     Social History Narrative    Lives with her 2 grand kids at home.        Family History   Problem Relation Age of Onset     Cancer - colorectal Mother      Unknown/Adopted Maternal Grandmother      Unknown/Adopted Maternal Grandfather      Unknown/Adopted Paternal Grandmother      Unknown/Adopted Paternal Grandfather      Depression Sister      Thyroid Disease Maternal Aunt      CANCER No family hx of      DIABETES No family hx of      Hypertension No family hx of      CEREBROVASCULAR DISEASE No family hx of      Glaucoma No family hx of      Macular Degeneration No family hx of          Review of Systems:  10 point ROS of systems including Constitutional, Eyes, Respiratory, Cardiovascular, Gastroenterology, Genitourinary, Integumentary, Muscularskeletal, Psychiatric were all negative except for pertinent positives noted in my HPI and in the PMH.          EXAM:  /83 (BP Location: Right arm, Cuff Size: Adult Regular)  Pulse 83  Wt 213 lb 3.2 oz (96.7 kg)  LMP 04/23/2018  SpO2 99%  Breastfeeding? No  BMI 32.84 kg/m2  Body mass index is 32.84 kg/(m^2).  General Appearance:  healthy, alert, active, no distress  Skin:  Normal skin turgor  Neuro:  Alert, cranial nerves grossly intact  HEENT: NCAT  Neck:  No masses or lesions carotids are +2/4. No bruits heard  Lungs:  Good respiratory effort   Abdomen: Soft, nontender.  Normal bowel sounds.  No masses  Pelvic exam:  Declined, since recently started bleeding  2 days ago.    Extremities:  No clubbing, cyanosis or edema.        ASSESSMENT:  Abnormal uterine bleeding   Fe deficiency anemia       PLAN:  We discussed the bleeding profiles as people age and approach menopause.  Even though menstrual changes and irregularities are common and expected, they may also indicate or precipitate endometrial abnormalities.   The patient and I discussed the options for evaluation.  The EMB, SIS and the D&C were reviewed with her.  The EMB will not remove a polyp, but will give a tissue sample.  The SIS will further evaluate the lining, but no tissue sample, and should she have a suspected polyp, it would not be removed.  The D&C is more expensive but is currently the gold standard.    Together we reviewed medical versus surgical therapy.  Medical therapy reviewed included hormonal manipulation with OCP's, Patch, Ring, Depo, or IUD.   We reviewed the aspects of fibroid embolization.  We reviewed endometrial ablation versus hysterectomy.  Discussed that endometrial ablation is minimally invasive compared to hysterectomy but may not be definitive.  The ablation is also not to be considered primary therapy in perimenopausal years as the bleeding is likely to be hormonal in origin.  She will have the ultrasound, followed by the follow up discussion, exam and likely EMB.      TT 30 min  CT greater than 50%    Eliseo Fox MD

## 2018-04-25 NOTE — NURSING NOTE
"Chief Complaint   Patient presents with     Consult     heavy periods and low hgb per Dr. Stephens       Initial /83 (BP Location: Right arm, Cuff Size: Adult Regular)  Pulse 83  Wt 213 lb 3.2 oz (96.7 kg)  LMP 04/23/2018  SpO2 99%  Breastfeeding? No  BMI 32.84 kg/m2 Estimated body mass index is 32.84 kg/(m^2) as calculated from the following:    Height as of 4/5/18: 5' 7.56\" (1.716 m).    Weight as of this encounter: 213 lb 3.2 oz (96.7 kg).  Medication Reconciliation: complete   ELVIA Fischer 4/25/2018         "

## 2018-04-25 NOTE — MR AVS SNAPSHOT
After Visit Summary   4/25/2018    Liane Key    MRN: [id number]           Patient Information     Date Of Birth          1970        Visit Information        Provider Department      4/25/2018 1:30 PM Eliseo Fox MD [facility]        Today's Diagnoses     Abnormal uterine bleeding    -  1       Follow-ups after your visit        Your next 10 appointments already scheduled     May 03, 2018  3:10 PM CDT   US PELVIC COMPLETE W TRANSVAGINAL with [email or web address]   [facility]    [address] [place] [id number] [phone]           Please bring a list of your medicines (including vitamins, minerals and over-the-counter drugs). Also, tell your doctor about any allergies you may have. Wear comfortable clothes and leave your valuables at home.  Adults: Drink six 8-ounce glasses of fluid one hour before your exam. Do NOT empty your bladder.  If you need to empty your bladder before your exam, try to release only a little bit of urine. Then, drink another 8oz glass of fluid.  Children: Children who are potty trained should drink at least 4 cups (32 oz) of liquid 45 minutes to one hour prior to the exam. The child s bladder must be full in order to achieve a diagnostic exam. If your child is very uncomfortable or has an urgent need to pee, please notify a technologist; they will try to find out how much longer the wait may be and provide instructions to help relieve the pressure. Occasionally it is medically necessary to insert a urinary catheter to fill the bladder.  Please call the Imaging Department at your exam site with any questions.            May 09, 2018  1:00 PM CDT   Office Visit with MD Fritz Mendiolaview Rajwinder Jones ([facility])    [address] [place] [id number] [phone]           Bring a current list of meds and any records pertaining to this visit. For Physicals,  please bring immunization records and any forms needing to be filled out. Please arrive 10 minutes early to complete paperwork.              Future tests that were ordered for you today     Open Future Orders        Priority Expected Expires Ordered    US Pelvic Complete with Transvaginal Routine  4/25/2019 4/25/2018            Who to contact     If you have questions or need follow up information about today's clinic visit or your schedule please contact [facility] directly at [phone].  Normal or non-critical lab and imaging results will be communicated to you by MyChart, letter or phone within 4 business days after the clinic has received the results. If you do not hear from us within 7 days, please contact the clinic through Elevate[employer] or phone. If you have a critical or abnormal lab result, we will notify you by phone as soon as possible.  Submit refill requests through [employer] or call your pharmacy and they will forward the refill request to us. Please allow 3 business days for your refill to be completed.          Additional Information About Your Visit        Care EveryWhere ID     This is your Care EveryWhere ID. This could be used by other organizations to access your [place] medical records  [id number]        Your Vitals Were     Pulse Last Period Pulse Oximetry Breastfeeding? BMI (Body Mass Index)       83 04/23/2018 99% No 32.84 kg/m2        Blood Pressure from Last 3 Encounters:   04/25/18 129/83   04/05/18 120/70   10/12/17 112/80    Weight from Last 3 Encounters:   04/25/18 213 lb 3.2 oz (96.7 kg)   04/05/18 220 lb 12.8 oz (100.2 kg)   10/12/17 227 lb (103 kg)               Primary Care Provider Office Phone # Fax #    Nahomi Castañeda [phone] [address] [address]        Equal Access to Services     PAWEL QUACH : Shellie Pena, whitley reyna, [facility]. So [facility]  [phone].    ATENCIÓN: Si beck parra, tiene a melgar disposición servicios gratuitos de asistencia lingüística. Coni carcamo [phone].    We comply with applicable federal civil rights laws and [place] laws. We do not discriminate on the basis of race, color, national origin, age, disability, sex, sexual orientation, or gender identity.            Thank you!     Thank you for choosing [facility]  for your care. Our goal is always to provide you with excellent care. Hearing back from our patients is one way we can continue to improve our services. Please take a few minutes to complete the written survey that you may receive in the mail after your visit with us. Thank you!             Your Updated Medication List - Protect others around you: Learn how to safely use, store and throw away your medicines at www.disposemymeds.org.          This list is accurate as of 4/25/18  2:28 PM.  Always use your most recent med list.                   Brand Name Dispense Instructions for use Diagnosis    ferrous sulfate 325 (65 Fe) MG tablet    IRON     Take by mouth daily (with breakfast)        levothyroxine 137 MCG tablet    SYNTHROID/LEVOTHROID    90 tablet    TAKE 1 TABLET (137 MCG) BY MOUTH DAILY    Hypothyroidism following radioiodine therapy       norethindrone-ethinyl estradiol 1-35 MG-MCG per tablet    ORTHO-NOVUM 1-35 TAB,NORTREL 1-35 TAB    84 tablet    Take 1 tablet by mouth daily    Excessive or frequent menstruation       PRENATAL 1 PO      Take 1 tablet by mouth daily        traZODone 50 MG tablet    DESYREL    30 tablet    TAKE 1 TABLET (50 MG) BY MOUTH AT BEDTIME    Insomnia, unspecified

## 2018-05-03 ENCOUNTER — RADIANT APPOINTMENT (OUTPATIENT)
Dept: ULTRASOUND IMAGING | Facility: CLINIC | Age: 48
End: 2018-05-03
Attending: OBSTETRICS & GYNECOLOGY
Payer: COMMERCIAL

## 2018-05-03 DIAGNOSIS — N93.9 ABNORMAL UTERINE BLEEDING: ICD-10-CM

## 2018-05-03 PROCEDURE — 76830 TRANSVAGINAL US NON-OB: CPT

## 2018-05-03 PROCEDURE — 76856 US EXAM PELVIC COMPLETE: CPT

## 2018-05-09 ENCOUNTER — OFFICE VISIT (OUTPATIENT)
Dept: OBGYN | Facility: CLINIC | Age: 48
End: 2018-05-09
Payer: COMMERCIAL

## 2018-05-09 VITALS
DIASTOLIC BLOOD PRESSURE: 89 MMHG | OXYGEN SATURATION: 100 % | HEART RATE: 98 BPM | SYSTOLIC BLOOD PRESSURE: 131 MMHG | WEIGHT: 211.6 LBS | BODY MASS INDEX: 32.6 KG/M2

## 2018-05-09 DIAGNOSIS — D50.9 IRON DEFICIENCY ANEMIA, UNSPECIFIED IRON DEFICIENCY ANEMIA TYPE: ICD-10-CM

## 2018-05-09 DIAGNOSIS — N93.9 ABNORMAL UTERINE BLEEDING: Primary | ICD-10-CM

## 2018-05-09 PROCEDURE — 58100 BIOPSY OF UTERUS LINING: CPT | Performed by: OBSTETRICS & GYNECOLOGY

## 2018-05-09 PROCEDURE — 99213 OFFICE O/P EST LOW 20 MIN: CPT | Mod: 25 | Performed by: OBSTETRICS & GYNECOLOGY

## 2018-05-09 PROCEDURE — 88305 TISSUE EXAM BY PATHOLOGIST: CPT | Performed by: OBSTETRICS & GYNECOLOGY

## 2018-05-09 NOTE — PROGRESS NOTES
"Liane is a 47 year old  here today for follow up of abnormal uterine bleeding and Fe deficiency anemia.  She has had abnormal bleeding for about the past 4 years.  She has noted that her menses are about 5-6 days and then stops and then she has a few days of spotting.  She thinks that she is bleeding about half the month.    It has interfered with her daily activities.  It has limited her ability to leave the house for fear of bleeding through paper products and through her clothes.  She has not lost work time due to the bleeding.    She changes the pads every 1.5 to 2 hours.  She uses a tampon and a pad and a Depends day and night.    Laying down helps.  When she stands up \"it feels like my whole insides are coming out.\"   She hasn't had dizziness, but she has had some light headedness.  She has had fatigued.  She has had episodes of being hot and cold.  She has not had any shortness of breath, no chest pain.    She had an ultrasound 3.5 years ago when I saw her previously, and she had a small fibroid adjacent to the endometrium.    She has been taking Fe supplements off and on for the anemia.  She recently started OCPs but she hasn't noticed any difference.        She had the following ultrasound and we reviewed the report and the films:    ULTRASOUND PELVIC COMPLETE WITH TRANSVAGINAL IMAGING  5/3/2018 3:40 PM  HISTORY:  Abnormal uterine bleeding.  TECHNIQUE:  Transvaginal images were performed to better evaluate the patient's uterus, ovaries and endometrial stripe.  COMPARISON: 2014 ultrasound.  FINDINGS: The uterus measures 12.0 x 5.7 x 6.0 cm and contains a 3.2 x 4.2 x 2.5 cm fundal fibroid. The fibroid previously measured 1.3 x 1.6 x 1.2 cm. Endometrial stripe is within normal limits at 1.4 cm.  Ovaries are normal bilaterally. No free fluid. The ovaries demonstrate normal color Doppler flow bilaterally.  IMPRESSION: Fundal fibroid has increased in size, now measuring 3.2 x 4.2 x 2.5 cm. Endometrial " stripe is within normal limits.      Component      Latest Ref Rng & Units 8/15/2016 10/12/2017 4/5/2018   WBC      4.0 - 11.0 10e9/L 4.4   7.2   RBC Count      3.8 - 5.2 10e12/L 3.65 (L)   3.90   Hemoglobin      11.7 - 15.7 g/dL 11.2 (L)   9.2 (L)   Hematocrit      35.0 - 47.0 % 33.9 (L)   29.7 (L)   Platelet Count      150 - 450 10e9/L 257   334   TSH      0.40 - 4.00 mU/L 0.72 4.44 (H)     T4 Free      0.76 - 1.46 ng/dL   1.14         Past Medical History:   Diagnosis Date     Graves disease      Hypothyroidism following radioiodine therapy        Past Surgical History:   Procedure Laterality Date     ANKLE SURGERY  1990    left- s/p ORIF     SURGICAL HISTORY OF -   1998    removal of gallstones     TUBAL LIGATION          No Known Allergies    Current Outpatient Prescriptions   Medication Sig Dispense Refill     ferrous sulfate (IRON) 325 (65 Fe) MG tablet Take by mouth daily (with breakfast)       levothyroxine (SYNTHROID/LEVOTHROID) 137 MCG tablet TAKE 1 TABLET (137 MCG) BY MOUTH DAILY 90 tablet 3     norethindrone-ethinyl estradiol (ORTHO-NOVUM 1-35 TAB,NORTREL 1-35 TAB) 1-35 MG-MCG per tablet Take 1 tablet by mouth daily 84 tablet 4     Prenatal MV-Min-Fe Fum-FA-DHA (PRENATAL 1 PO) Take 1 tablet by mouth daily       traZODone (DESYREL) 50 MG tablet TAKE 1 TABLET (50 MG) BY MOUTH AT BEDTIME (Patient not taking: Reported on 4/25/2018) 30 tablet 7       Social History     Social History     Marital status: Single     Spouse name: N/A     Number of children: 4     Years of education: N/A     Occupational History      [employer]     Social History Main Topics     Smoking status: Former Smoker     Packs/day: 0.20     Years: 5.00     Types: Cigarettes     Quit date: 5/30/2012     Smokeless tobacco: Never Used     Alcohol use Yes      Comment: occasional      Drug use: Yes      Comment: imeldaania - smokes it for appetite      Sexual activity: Yes     Partners: Male     Birth control/ protection:  Condom, Surgical     Other Topics Concern     Parent/Sibling W/ Cabg, Mi Or Angioplasty Before 65f 55m? No     Social History Narrative    Lives with her 2 grand kids at home.        Family History   Problem Relation Age of Onset     Cancer - colorectal Mother      Unknown/Adopted Maternal Grandmother      Unknown/Adopted Maternal Grandfather      Unknown/Adopted Paternal Grandmother      Unknown/Adopted Paternal Grandfather      Depression Sister      Thyroid Disease Maternal Aunt      CANCER No family hx of      DIABETES No family hx of      Hypertension No family hx of      CEREBROVASCULAR DISEASE No family hx of      Glaucoma No family hx of      Macular Degeneration No family hx of        Review of Systems:  10 point ROS of systems including Constitutional, Eyes, Respiratory, Cardiovascular, Gastroenterology, Genitourinary, Integumentary, Muscularskeletal, Psychiatric were all negative except for pertinent positives noted in my HPI and in the PMH.      Exam  /89 (BP Location: Right arm, Cuff Size: Adult Regular)  Pulse 98  Wt 211 lb 9.6 oz (96 kg)  LMP 04/23/2018  SpO2 100%  BMI 32.6 kg/m2  General:  WNWD female, NAD  Alert  Oriented x 3  Gait:  Normal  Skin:  Normal skin turgor  HEENT:  NC/AT, EOMI  Abdomen:  Non-tender, non-distended.  Vulva: No external lesions, normal hair distribution, no adenopathy  BUS:  Normal, no masses noted  Urethra:  No hypermobility seen  Urethral meatus:  No masses noted  Vagina: Moist, pink, no abnormal discharge, well rugated, no lesions  Cervix: Smooth, pink, no visible lesions  Uterus: enlarged, some what irregular contour, non-tender, mobile  Ovaries: No mass, non-tender, mobile  Perianal:  No masses noted  Extremities:  No clubbing, no cyanosis and no edema.      Assessment  Abnormal uterine bleeding      Plan  We discussed further evaluation of the bleeding.  The EMB, the SIS and the D&C with hysteroscopy reviewed with the associated risks and benefits and goals  and limitations.  At this time, she is interested in the EMB.  Questions seem to be answered.   RTC 2 weeks for results and further discussion regarding the possibilty of the ablation or the hysterectomy.    Eliseo Fox MD            PROCEDURE NOTE:  The procedure was reviewed with patient.  After consenting to the procedure she was placed into the dorsal lithotomy position.  The examination was performed.  The speculum was placed into the vagina.  The cervix was prepped with Betadine.  The anterior lip of the cervix was grasped with the Allis tenaculum.  The uterus was sounded to 10 cm.  The Pipelle was used to sample the endometrium.    Instruments were removed and the specimen was sent to pathology.  Results to the patient in 1-2 weeks when they are returned.    Eliseo Fox MD

## 2018-05-09 NOTE — MR AVS SNAPSHOT
After Visit Summary   5/9/2018    Liane Key    MRN: [id number]           Patient Information     Date Of Birth          1970        Visit Information        Provider Department      5/9/2018 1:00 PM Eliseo Fox MD [facility]         Follow-ups after your visit        Your next 10 appointments already scheduled     May 22, 2018  2:30 PM CDT   Office Visit with Eliseo Fox MD   [facility] ([address] [id number] [phone]           Bring a current list of meds and any records pertaining to this visit. For Physicals, please bring immunization records and any forms needing to be filled out. Please arrive 10 minutes early to complete paperwork.              Who to contact     If you have questions or need follow up information about today's clinic visit or your schedule please contact [facility] directly at [phone].  Normal or non-critical lab and imaging results will be communicated to you by MyChart, letter or phone within 4 business days after the clinic has received the results. If you do not hear from us within 7 days, please contact the clinic through [employer] or phone. If you have a critical or abnormal lab result, we will notify you by phone as soon as possible.  Submit refill requests through [employer] or call your pharmacy and they will forward the refill request to us. Please allow 3 business days for your refill to be completed.          Additional Information About Your Visit        Care EveryWhere ID     This is your Care EveryWhere ID. This could be used by other organizations to access your [facility] medical records  [id number]        Your Vitals Were     Pulse Last Period Pulse Oximetry BMI (Body Mass Index)          98 04/23/2018 100% 32.6 kg/m2         Blood Pressure from Last 3 Encounters:   05/09/18 131/89   04/25/18 129/83   04/05/18 120/70    Weight from  Last 3 Encounters:   05/09/18 211 lb 9.6 oz (96 kg)   04/25/18 213 lb 3.2 oz (96.7 kg)   04/05/18 220 lb 12.8 oz (100.2 kg)              Today, you had the following     No orders found for display       Primary Care Provider Office Phone # Fax #    Nahomi Castañeda [phone] [address] [facility] [address]        Equal Access to Services     AIDEE QUACH : Hadii aad ku hadasho Soomaali, waaxda luqadaha, qaybta kaalmada adeegyada, waxay idiin hayaan adeblane vargasaragloria lajerica . So [facility] [phone].    ATENCIÓN: Si habla español, tiene a melgar disposición servicios gratuitos de asistencia lingüística. [facility] [phone].    We comply with applicable federal civil rights laws and [place] laws. We do not discriminate on the basis of race, color, national origin, age, disability, sex, sexual orientation, or gender identity.            Thank you!     Thank you for choosing [facility]  for your care. Our goal is always to provide you with excellent care. Hearing back from our patients is one way we can continue to improve our services. Please take a few minutes to complete the written survey that you may receive in the mail after your visit with us. Thank you!             Your Updated Medication List - Protect others around you: Learn how to safely use, store and throw away your medicines at www.disposemymeds.org.          This list is accurate as of 5/9/18  1:48 PM.  Always use your most recent med list.                   Brand Name Dispense Instructions for use Diagnosis    ferrous sulfate 325 (65 Fe) MG tablet    IRON     Take by mouth daily (with breakfast)        levothyroxine 137 MCG tablet    SYNTHROID/LEVOTHROID    90 tablet    TAKE 1 TABLET (137 MCG) BY MOUTH DAILY    Hypothyroidism following radioiodine therapy       norethindrone-ethinyl estradiol 1-35 MG-MCG per tablet    ORTHO-NOVUM 1-35 TAB,NORTREL 1-35 TAB    84 tablet    Take 1 tablet by mouth daily    Excessive or  frequent menstruation       PRENATAL 1 PO      Take 1 tablet by mouth daily        traZODone 50 MG tablet    DESYREL    30 tablet    TAKE 1 TABLET (50 MG) BY MOUTH AT BEDTIME    Insomnia, unspecified

## 2018-05-13 LAB — COPATH REPORT: NORMAL

## 2018-05-22 ENCOUNTER — OFFICE VISIT (OUTPATIENT)
Dept: OBGYN | Facility: CLINIC | Age: 48
End: 2018-05-22
Payer: COMMERCIAL

## 2018-05-22 ENCOUNTER — TELEPHONE (OUTPATIENT)
Dept: OBGYN | Facility: CLINIC | Age: 48
End: 2018-05-22

## 2018-05-22 VITALS
BODY MASS INDEX: 32.35 KG/M2 | OXYGEN SATURATION: 100 % | HEART RATE: 106 BPM | WEIGHT: 210 LBS | DIASTOLIC BLOOD PRESSURE: 80 MMHG | SYSTOLIC BLOOD PRESSURE: 130 MMHG

## 2018-05-22 DIAGNOSIS — N84.0 ENDOMETRIAL POLYP: ICD-10-CM

## 2018-05-22 DIAGNOSIS — N94.6 DYSMENORRHEA: ICD-10-CM

## 2018-05-22 DIAGNOSIS — N92.1 MENORRHAGIA WITH IRREGULAR CYCLE: Primary | ICD-10-CM

## 2018-05-22 DIAGNOSIS — D50.9 IRON DEFICIENCY ANEMIA, UNSPECIFIED IRON DEFICIENCY ANEMIA TYPE: ICD-10-CM

## 2018-05-22 DIAGNOSIS — D25.0 SUBMUCOUS LEIOMYOMA OF UTERUS: ICD-10-CM

## 2018-05-22 PROCEDURE — 99215 OFFICE O/P EST HI 40 MIN: CPT | Performed by: OBSTETRICS & GYNECOLOGY

## 2018-05-22 NOTE — MR AVS SNAPSHOT
After Visit Summary   5/22/2018    Liane Key    MRN: [id number]           Patient Information     Date Of Birth          1970        Visit Information        Provider Department      5/22/2018 2:30 PM Elise[facility] Fox MD [facility]        Today's Diagnoses     Menorrhagia with irregular cycle    -  1    Dysmenorrhea        Iron deficiency anemia, unspecified iron deficiency anemia type        Submucous leiomyoma of uterus        Endometrial polyp           Follow-ups after your visit        Your next 10 appointments already scheduled     Aug 24, 2018  9:00 AM CDT   Post Op with Eliseo Fox MD   [facility] ([facility])    [address] [place] [id number] [phone]              Who to contact     If you have questions or need follow up information about today's clinic visit or your schedule please contact [facility] directly at [phone].  Normal or non-critical lab and imaging results will be communicated to you by MyChart, letter or phone within 4 business days after the clinic has received the results. If you do not hear from us within 7 days, please contact the clinic through MyChart or phone. If you have a critical or abnormal lab result, we will notify you by phone as soon as possible.  Submit refill requests through [employer] or call your pharmacy and they will forward the refill request to us. Please allow 3 business days for your refill to be completed.          Additional Information About Your Visit        Care EveryWhere ID     This is your Care EveryWhere ID. This could be used by other organizations to access your [facility] medical records  [id number]        Your Vitals Were     Pulse Last Period Pulse Oximetry BMI (Body Mass Index)          106 04/23/2018 100% 32.35 kg/m2         Blood Pressure from Last 3 Encounters:   05/22/18 130/80   05/09/18 131/89   04/25/18 129/83    Weight from  Last 3 Encounters:   05/22/18 210 lb (95.3 kg)   05/09/18 211 lb 9.6 oz (96 kg)   04/25/18 213 lb 3.2 oz (96.7 kg)              We Performed the Following     Amber-Operative Worksheet        Primary Care Provider Office Phone # Fax #    Nahomi Castañeda [phone]       [address] [facility] [address]        Equal Access to Services     [facility] : Hadii aad ku hadasho Soomaali, waaxda luqadaha, qaybta kaalmada adeegyada, waxay idiin hayaan adeeg sammelgloria lajerica . So [facility] [phone].    ATENCIÓN: Si zoela español, tiene a melgar disposición servicios gratuitos de asistencia lingüística. [facility] [phone].    We comply with applicable federal civil rights laws and [place] laws. We do not discriminate on the basis of race, color, national origin, age, disability, sex, sexual orientation, or gender identity.            Thank you!     Thank you for choosing [facility]  for your care. Our goal is always to provide you with excellent care. Hearing back from our patients is one way we can continue to improve our services. Please take a few minutes to complete the written survey that you may receive in the mail after your visit with us. Thank you!             Your Updated Medication List - Protect others around you: Learn how to safely use, store and throw away your medicines at www.disposemymeds.org.          This list is accurate as of 5/22/18 11:59 PM.  Always use your most recent med list.                   Brand Name Dispense Instructions for use Diagnosis    ferrous sulfate 325 (65 Fe) MG tablet    IRON     Take by mouth daily (with breakfast)        levothyroxine 137 MCG tablet    SYNTHROID/LEVOTHROID    90 tablet    TAKE 1 TABLET (137 MCG) BY MOUTH DAILY    Hypothyroidism following radioiodine therapy       norethindrone-ethinyl estradiol 1-35 MG-MCG per tablet    ORTHO-NOVUM 1-35 TAB,NORTREL 1-35 TAB    84 tablet    Take 1 tablet by mouth daily    Excessive or frequent  menstruation       PRENATAL 1 PO      Take 1 tablet by mouth daily        traZODone 50 MG tablet    DESYREL    30 tablet    TAKE 1 TABLET (50 MG) BY MOUTH AT BEDTIME    Insomnia, unspecified

## 2018-05-22 NOTE — PROGRESS NOTES
"  Liane Key is a 47 year old female, .  She presents today for follow up of abnormal uterine bleeding and iron deficiency anemia.  She has had this for abut 4 years.  She has contemplated the options that we have discussed.  We are to discuss these again today in further detail.    She has had menses about 5-6 days duration, then it would stop and then she had a few more days of spotting, ultimately bleeding for about 1/2 of the month.  The bleeding has interfered with her daily activities of living.  She feels unable to leave her home for fear of bleeding through paper products and through her clothes.  She has not lost work time, however.    She changes pads every 1.5 to 2 hours.  She uses a tampon and a pad and a Depends day and night.  She feels that laying down helps (although I discussed with her that this occurs due to the position of the vagina when laying down).  When she stand up, she has heavy bleeding \"like my whole insides are coming out.\"  She has not had dizziness, but she has had light headedness. She has fatigue.  She also has episodes of feeling hot and cold.  She has not had shortness of breath or chest pain.   She has also had cramping with the menses that continues with her conservative approaches so far.    3 1/2 years ago, she had an ultrasound that showed a small adjacent fibroid to the endometrium.    She has taken Fe supplements and she has also had OCPs, but she doesn't feel that she has had any improvement in the symptoms.      At her past visit, she had an endometrial biopsy preformed and we discussed management options.  It is possible the bleeding might be contributed in part, by the polypoid like tissue.      Her EMB results are as noted:    Specimen #: [id number]   Collected: 2018   Received: 5/10/2018   Reported: 2018 15:39   Ordering Phy(s): JAZMIN TAVERAS     For improved result formatting, select 'View Enhanced Report Format' under    Linked Documents " section.     SPECIMEN(S):   Endometrial biopsy     FINAL DIAGNOSIS:   Endometrial biopsy:   - Benign endometrial polyp with inactive non-phasic endometrium.       She had the following ultrasound and we reviewed the report and the films:     ULTRASOUND PELVIC COMPLETE WITH TRANSVAGINAL IMAGING  5/3/2018 3:40 PM  HISTORY:  Abnormal uterine bleeding.  TECHNIQUE:  Transvaginal images were performed to better evaluate the patient's uterus, ovaries and endometrial stripe.  COMPARISON: 9/18/2014 ultrasound.  FINDINGS: The uterus measures 12.0 x 5.7 x 6.0 cm and contains a 3.2 x 4.2 x 2.5 cm fundal fibroid. The fibroid previously measured 1.3 x 1.6 x 1.2 cm. Endometrial stripe is within normal limits at 1.4 cm.  Ovaries are normal bilaterally. No free fluid. The ovaries demonstrate normal color Doppler flow bilaterally.  IMPRESSION: Fundal fibroid has increased in size, now measuring 3.2 x 4.2 x 2.5 cm. Endometrial stripe is within normal limits.    Component      Latest Ref Rng & Units 8/15/2016 10/12/2017 4/5/2018   WBC      4.0 - 11.0 10e9/L 4.4    7.2   RBC Count      3.8 - 5.2 10e12/L 3.65 (L)    3.90   Hemoglobin      11.7 - 15.7 g/dL 11.2 (L)    9.2 (L)   Hematocrit      35.0 - 47.0 % 33.9 (L)    29.7 (L)   Platelet Count      150 - 450 10e9/L 257    334   TSH      0.40 - 4.00 mU/L 0.72 4.44 (H)      T4 Free      0.76 - 1.46 ng/dL    1.14          Past Medical History:   Diagnosis Date     Graves disease      Hypothyroidism following radioiodine therapy        Past Surgical History:   Procedure Laterality Date     ANKLE SURGERY  1990    left- s/p ORIF     SURGICAL HISTORY OF -   1998    removal of gallstones     TUBAL LIGATION          No Known Allergies    Current Outpatient Prescriptions   Medication Sig Dispense Refill     ferrous sulfate (IRON) 325 (65 Fe) MG tablet Take by mouth daily (with breakfast)       levothyroxine (SYNTHROID/LEVOTHROID) 137 MCG tablet TAKE 1 TABLET (137 MCG) BY MOUTH DAILY 90 tablet 3      norethindrone-ethinyl estradiol (ORTHO-NOVUM 1-35 TAB,NORTREL 1-35 TAB) 1-35 MG-MCG per tablet Take 1 tablet by mouth daily 84 tablet 4     Prenatal MV-Min-Fe Fum-FA-DHA (PRENATAL 1 PO) Take 1 tablet by mouth daily       traZODone (DESYREL) 50 MG tablet TAKE 1 TABLET (50 MG) BY MOUTH AT BEDTIME (Patient not taking: Reported on 4/25/2018) 30 tablet 7       Social History     Social History     Marital status: Single     Spouse name: N/A     Number of children: 4     Years of education: N/A     Occupational History      [employer]     Social History Main Topics     Smoking status: Former Smoker     Packs/day: 0.20     Years: 5.00     Types: Cigarettes     Quit date: 5/30/2012     Smokeless tobacco: Never Used     Alcohol use Yes      Comment: occasional      Drug use: Yes      Comment: marjuania - smokes it for appetite      Sexual activity: Yes     Partners: Male     Birth control/ protection: Condom, Surgical     Other Topics Concern     Parent/Sibling W/ Cabg, Mi Or Angioplasty Before 65f 55m? No     Social History Narrative    Lives with her 2 grand kids at home.        Family History   Problem Relation Age of Onset     Cancer - colorectal Mother      Unknown/Adopted Maternal Grandmother      Unknown/Adopted Maternal Grandfather      Unknown/Adopted Paternal Grandmother      Unknown/Adopted Paternal Grandfather      Depression Sister      Thyroid Disease Maternal Aunt      CANCER No family hx of      DIABETES No family hx of      Hypertension No family hx of      CEREBROVASCULAR DISEASE No family hx of      Glaucoma No family hx of      Macular Degeneration No family hx of        Review of Systems:  10 point ROS of systems including Constitutional, Eyes, Respiratory, Cardiovascular, Gastroenterology, Genitourinary, Integumentary, Muscularskeletal, Psychiatric were all negative except for pertinent positives noted in my HPI and in the PMH.        Exam  /80 (BP Location: Right arm, Cuff  Size: Adult Regular)  Pulse 106  Wt 210 lb (95.3 kg)  LMP 04/23/2018  SpO2 100%  BMI 32.35 kg/m2  General:  WNWD female, NAD  Alert  Oriented x 3  Gait:  Normal  Skin:  Normal skin turgor  HEENT:  NC/AT, EOMI  Abdomen:  Non-tender, non-distended.  Pelvic exam:  Not performed  Extremities:  No clubbing, no cyanosis and no edema.      Assessment  Abnormal uterine bleeding  dysmenorrhea  Fe deficiency anemia  Uterine leiomyoma  Endometrial polyp      Plan  She has decided that she would like to have the hysterectomy. We reviewed the approaches for hysterectomy.   We discussed the different routes of surgery including abdominal, vaginal, and laparoscopic and the possibility that the route of surgery may change during the procedure.  We discussed both total and supracervical hysterectomy and the benefits and contraindications involved.  We discussed ovarian sparing as well as oophorectomy, and the associated risks and benefits.  She also is informed that with ovarian sparing, she could still have inadvertent ovarian failure and the reasons for this were reviewed.  I also reviewed with her that she would have a 25% chance of needing surgery in the future with the ovarian sparing (pain, cysts, malignancies).  This also means that she would have ~75% chance that she would never need surgery in the future, in regard to the ovaries.  The ACOG pamphlets have been given and reviewed with the patient. The patient is aware that hysterectomy will render her sterile and unable to have further children.The risks of surgery were reviewed and include, but are not limited to, death, brain damage, paralysis of any or all limbs, loss of an organ, function of an organ, disfiguring scars, bleeding, infection, damage to the ovaries, bowel, bladder and vagina.  In addition, there is a possibility of other procedures that might be deemed necessary at the time of the surgery, depending upon findings and/or complications.  This may also  include laparoscopy, laparotomy, and rarely colostomy (which often times can be reversible in the future).  Risks with blood include but are not limited to HIV/AIDS, hepatitis and transfusion reactions, with transfusion reactions being the greatest risk.    We reviewed pre and post op course. Pre op enemas were reviewed.  NPO after MN.  Post op pain management, ambulation, nolan packing's were also reviewed.  Discharge precautions, lifting restrictions, driving restrictions as well as the pelvic activity restrictions were reviewed with her.  She would like to attempt the LAVH (to release the ligaments from above) in order to help increase her chances of having a vaginal hysterectomy so she might be able to return to work and daily activities a little sooner than other wise might be expected.      Patient was given the opportunity to ask questions and have them answered.    TT 45 min  CT greater than 70%    Eliseo Fox MD

## 2018-05-22 NOTE — TELEPHONE ENCOUNTER
Associated Diagnoses      Menorrhagia with irregular cycle  - Primary         Dysmenorrhea           Order Questions      Question Answer Comment     Procedure name(s) - multi select LAVH      Is this a multi surgeon case? No      Laterality N/A      Explant? No      Reason for procedure menorrhagia      Urgency of Surgery Patient Choice/Next Available      Location of Case: [facility]      Surgeon Procedure Time (incision to closure) in minutes (per procedure as applicable) 120      Note:  Surgical Case Time Needed (in minutes)     Patient Class (for admit prior to surgery, specify number of days in comments): Same day (hospital outpatient)      Why can t this outpatient surgery be done at the [facility] or  ASC? need possible hospital recovery      Length of Stay: 1 day      Anesthesia General      H&P To Be Completed By: PCP      Is a PAC Consult order needed for the case? No      Note:  Preoperative Assessment Center     Vendor Needed? No      Spinal Cord Monitoring? No

## 2018-05-22 NOTE — TELEPHONE ENCOUNTER
Surgery Scheduled.    Date of Surgery 7/13/18 Time of Surgery 1:30 pm  Procedure: Laparoscopic Assisted Vaginal Hysterectomy  [place] [facility]  [place]geon: Dr. Fox  Type of Anesthesia Anticipated: General  Pre-op: To be scheduled with Dr. Castañeda  Pre-certification 5/22/18  Consent signed: NA Previous   Hospital Stay NA       [facility] [place] packet given to patient at OV ON 5/22/18. Patient instructed NPO 12 hours prior to surgery, arrive 1 1/2 hours prior to surgery, must have a .  Patient understood and agrees to the plan.      Surgery Pre-Certification    Medical Record Number: [id number]  Liane Key  YOB: 1970   Phone: [phone] (home)   Primary Provider: Nahomi Castañeda    Reason for Admit:  Menorrhagia/ Dysmenorrhea    Surgeon: FRANCIA Fox MD  Surgical Procedure: Laparoscopic Assisted Vaginal Hysterectomy  ICD-9 Coded: N92.0/ N94.6  Date of Surgery: 7/13/18  Consent signed? N/A    Date signed: Previous TL- 1990  Hospital: [facility]  Outpatient    Requestor:  Nadege Gallegos     Location:  [facility]    Deena Elise CMA

## 2018-05-24 NOTE — TELEPHONE ENCOUNTER
I spoke with Alayna at [facility] this patient is in network and eligible for outpatient services at [facility]-  CPT 36491 Laparoscopic Assisted Vaginal Hysterectomy- no prior authorization is required coverage is based on medical necessity   -patient will have a $3.00 copay for the service   REF#[id number] ALAYNA    Waiting on surgery form

## 2018-05-29 NOTE — TELEPHONE ENCOUNTER
Spoke to Alayna at [facility] insurance CPT code: 23211 no auth or pre-d is needed under this plan. Patient has no deductible or co-insurance and surgery will be covered at 100% as long as it's medically necessary. Call ref. # [id number] 02/25/18 2pm

## 2018-06-12 ENCOUNTER — OFFICE VISIT (OUTPATIENT)
Dept: FAMILY MEDICINE | Facility: CLINIC | Age: 48
End: 2018-06-12
Payer: COMMERCIAL

## 2018-06-12 VITALS
RESPIRATION RATE: 20 BRPM | OXYGEN SATURATION: 98 % | HEART RATE: 100 BPM | HEIGHT: 68 IN | WEIGHT: 207 LBS | TEMPERATURE: 97.8 F | SYSTOLIC BLOOD PRESSURE: 138 MMHG | DIASTOLIC BLOOD PRESSURE: 92 MMHG | BODY MASS INDEX: 31.37 KG/M2

## 2018-06-12 DIAGNOSIS — R03.0 ELEVATED BLOOD PRESSURE READING WITHOUT DIAGNOSIS OF HYPERTENSION: ICD-10-CM

## 2018-06-12 DIAGNOSIS — M54.5 ACUTE BILATERAL LOW BACK PAIN, WITH SCIATICA PRESENCE UNSPECIFIED: Primary | ICD-10-CM

## 2018-06-12 DIAGNOSIS — D50.9 IRON DEFICIENCY ANEMIA, UNSPECIFIED IRON DEFICIENCY ANEMIA TYPE: ICD-10-CM

## 2018-06-12 LAB
ALBUMIN UR-MCNC: 30 MG/DL
APPEARANCE UR: ABNORMAL
BILIRUB UR QL STRIP: NEGATIVE
COLOR UR AUTO: ABNORMAL
GLUCOSE UR STRIP-MCNC: NEGATIVE MG/DL
HGB UR QL STRIP: ABNORMAL
KETONES UR STRIP-MCNC: ABNORMAL MG/DL
LEUKOCYTE ESTERASE UR QL STRIP: NEGATIVE
NITRATE UR QL: NEGATIVE
NON-SQ EPI CELLS #/AREA URNS LPF: ABNORMAL /LPF
PH UR STRIP: 6 PH (ref 5–7)
RBC #/AREA URNS AUTO: >100 /HPF
SOURCE: ABNORMAL
SP GR UR STRIP: >1.03 (ref 1–1.03)
UROBILINOGEN UR STRIP-ACNC: 0.2 EU/DL (ref 0.2–1)
WBC #/AREA URNS AUTO: ABNORMAL /HPF

## 2018-06-12 PROCEDURE — 99213 OFFICE O/P EST LOW 20 MIN: CPT | Performed by: FAMILY MEDICINE

## 2018-06-12 PROCEDURE — 81001 URINALYSIS AUTO W/SCOPE: CPT | Performed by: FAMILY MEDICINE

## 2018-06-12 PROCEDURE — 87086 URINE CULTURE/COLONY COUNT: CPT | Performed by: FAMILY MEDICINE

## 2018-06-12 RX ORDER — NAPROXEN 500 MG/1
500 TABLET ORAL 2 TIMES DAILY PRN
Qty: 30 TABLET | Refills: 1 | Status: SHIPPED | OUTPATIENT
Start: 2018-06-12 | End: 2018-08-15

## 2018-06-12 RX ORDER — CYCLOBENZAPRINE HCL 10 MG
10 TABLET ORAL 3 TIMES DAILY PRN
Qty: 14 TABLET | Refills: 1 | Status: SHIPPED | OUTPATIENT
Start: 2018-06-12 | End: 2018-08-15

## 2018-06-12 NOTE — MR AVS SNAPSHOT
After Visit Summary   6/12/2018    Liane Key    MRN: [id number]           Patient Information     Date Of Birth          1970        Visit Information        Provider Department      6/12/2018 10:00 AM Glenis Trinidad MD [facility]        Today's Diagnoses     Acute bilateral low back pain, with sciatica presence unspecified    -  1    Elevated blood pressure reading without diagnosis of hypertension        Iron deficiency anemia, unspecified iron deficiency anemia type          Care Instructions    [facility]    If you have any questions regarding to your visit please contact your care team:       Team Red:   Clinic Hours Telephone Number   Dr. Glenis Gusman, NP   7am-7pm  Monday - Thursday   7am-5pm  Fridays  [phone]  (Appointment scheduling available 24/7)    Questions about your recent visit?   Team Line  [phone]   Urgent Care - [place] [facility] - 11am-9pm Monday-Friday Saturday-Sunday- 9am-5pm   [place] - 5pm-9pm Monday-Friday Saturday-Sunday- 9am-5pm  [phone] - [place]  [phone] - [place]       What options do I have for a visit other than an office visit? We offer electronic visits (e-visits) and telephone visits, when medically appropriate.  Please check with your medical insurance to see if these types of visits are covered, as you will be responsible for any charges that are not paid by your insurance.      You can use [employer] (secure electronic communication) to access to your chart, send your primary care provider a message, or make an appointment. Ask a team member how to get started.     For a price quote for your services, please call our Consumer Price Line at [phone] or our Imaging Cost estimation line at [phone] (for imaging tests).      Back Care Tips    Caring for your back  These are things you can do to prevent a recurrence of acute  back pain and to reduce symptoms from chronic back pain:    Maintain a healthy weight. If you are overweight, losing weight will help most types of back pain.    Exercise is an important part of recovery from most types of back pain. The muscles behind and in front of the spine support the back. This means strengthening both the back muscles and the abdominal muscles will provide better support for your spine.     Swimming and brisk walking are good overall exercises to improve your fitness level.    Practice safe lifting methods (below).    Practice good posture when sitting, standing and walking. Avoid prolonged sitting. This puts more stress on the lower back than standing or walking.    Wear quality shoes with sufficient arch support. Foot and ankle alignment can affect back symptoms. Women should avoid wearing high heels.    Therapeutic massage can help relax the back muscles without stretching them.    During the first 24 to 72 hours after an acute injury or flare-up of chronic back pain, apply an ice pack to the painful area for 20 minutes and then remove it for 20 minutes, over a period of 60 to 90 minutes, or several times a day. As a safety precaution, do not use a heating pad at bedtime. Sleeping on a heating pad can lead to skin burns or tissue damage.    You can alternate ice and heat therapies.  Medicines  Talk to your healthcare provider before using medicines, especially if you have other medical problems or are taking other medicines.    You may use acetaminophen or ibuprofen to control pain, unless your healthcare provider prescribed other pain medicine. If you have chronic conditions like diabetes, liver or kidney disease, stomach ulcers, or gastrointestinal bleeding, or are taking blood thinners, talk with your healthcare provider before taking any medicines.    Be careful if you are given prescription pain medicines, narcotics, or medicine for muscle spasm. They can cause drowsiness, affect your  coordination, reflexes, and judgment. Do not drive or operate heavy machinery while taking these types of medicines. Take prescription pain medicine only as prescribed by your healthcare provider.  Lumbar stretch  Here is a simple stretching exercise that will help relax muscle spasm and keep your back more limber. If exercise makes your back pain worse, don t do it.    Lie on your back with your knees bent and both feet on the ground.    Slowly raise your left knee to your chest as you flatten your lower back against the floor. Hold for 5 seconds.    Relax and repeat the exercise with your right knee.    Do 10 of these exercises for each leg.  Safe lifting method    Don t bend over at the waist to lift an object off the floor.  Instead, bend your knees and hips in a squat.     Keep your back and head upright    Hold the object close to your body, directly in front of you.    Straighten your legs to lift the object.     Lower the object to the floor in the reverse fashion.    If you must slide something across the floor, push it.  Posture tips  Sitting  Sit in chairs with straight backs or low-back support. Keep your knees lower than your hips, with your feet flat on the floor.  When driving, sit up straight. Adjust the seat forward so you are not leaning toward the steering wheel.  A small pillow or rolled towel behind your lower back may help if you are driving long distances.   Standing  When standing for long periods, shift most of your weight to one leg at a time. Alternate legs every few minutes.   Sleeping  The best way to sleep is on your side with your knees bent. Put a low pillow under your head to support your neck in a neutral spine position. Avoid thick pillows that bend your neck to one side. Put a pillow between your legs to further relax your lower back. If you sleep on your back, put pillows under your knees to support your legs in a slightly flexed position. Use a firm mattress. If your mattress  sags, replace it, or use a 1/2-inch plywood board under the mattress to add support.  Follow-up care  Follow up with your healthcare provider, or as advised.  If X-rays, a CT scan or an MRI scan were taken, they will be reviewed by a radiologist. You will be notified of any new findings that may affect your care.  Call 911  Call 911 if any of the following occur:    Trouble breathing    Confusion    Very drowsy    Fainting or loss of consciousness    Rapid or very slow heart rate    Loss of  bowel or bladder control  When to seek medical advice  Call your healthcare provider right away if any of the following occur:    Pain becomes worse or spreads to your arms or legs    Weakness or numbness in one or both arms or legs    Numbness in the groin area  Date Last Reviewed: 6/1/2016 2000-2017 [employer]. [address] [place] [address]. All rights reserved. This information is not intended as a substitute for professional medical care. Always follow your healthcare professional's instructions.      Discharged by KATIE Hoyt MA          Follow-ups after your visit        Follow-up notes from your care team     Return in about 1 month (around 7/12/2018), or if symptoms worsen or fail to improve, for free pharmacy blood pressure check (walk-in).      Your next 10 appointments already scheduled     Aug 24, 2018  9:00 AM CDT   Post Op with Eliseo Fox MD   [facility] ([facility] [place])    [address] [place] [id number]   [phone]              Who to contact     If you have questions or need follow up information about today's clinic visit or your schedule please contact [facility] directly at [phone].  Normal or non-critical lab and imaging results will be communicated to you by MyChart, letter or phone within 4 business days after the clinic has received the results. If you do not hear from us within 7 days, please contact the  "clinic through [employer] labshart or phone. If you have a critical or abnormal lab result, we will notify you by phone as soon as possible.  Submit refill requests through [employer] or call your pharmacy and they will forward the refill request to us. Please allow 3 business days for your refill to be completed.          Additional Information About Your Visit        Care EveryWhere ID     This is your Care EveryWhere ID. This could be used by other organizations to access your [facility] medical records  [id number]        Your Vitals Were     Pulse Temperature Respirations Height Last Period Pulse Oximetry    100 97.8  F (36.6  C) (Oral) 20 5' 7.75\" (1.721 m) 06/07/2018 98%    Breastfeeding? BMI (Body Mass Index)                No 31.71 kg/m2           Blood Pressure from Last 3 Encounters:   06/12/18 (!) 138/92   05/22/18 130/80   05/09/18 131/89    Weight from Last 3 Encounters:   06/12/18 207 lb (93.9 kg)   05/22/18 210 lb (95.3 kg)   05/09/18 211 lb 9.6 oz (96 kg)              We Performed the Following     UA reflex to Microscopic and Culture          Today's Medication Changes          These changes are accurate as of 6/12/18 10:41 AM.  If you have any questions, ask your nurse or doctor.               Start taking these medicines.        Dose/Directions    cyclobenzaprine 10 MG tablet   Commonly known as:  FLEXERIL   Used for:  Acute bilateral low back pain, with sciatica presence unspecified   Started by:  Glenis Trinidad MD        Dose:  10 mg   Take 1 tablet (10 mg) by mouth 3 times daily as needed for muscle spasms   Quantity:  14 tablet   Refills:  1       naproxen 500 MG tablet   Commonly known as:  NAPROSYN   Used for:  Acute bilateral low back pain, with sciatica presence unspecified   Started by:  Glenis Trinidad MD        Dose:  500 mg   Take 1 tablet (500 mg) by mouth 2 times daily as needed for moderate pain   Quantity:  30 tablet   Refills:  1            Where to get your medicines      These medications " were sent to [facility] [place], [place] - [address]  [address], [place] [address]     Phone:  [phone]     cyclobenzaprine 10 MG tablet    naproxen 500 MG tablet                Primary Care Provider Office Phone # Fax #    Nahomi Castañeda [phone] [address] [facility]  [place] [address]        Equal Access to Services     AIDEE QUACH : Hadii aad ku hadasho Soomaali, waaxda luqadaha, qaybta kaalmada adeegyada, waxay idiin hayaan adeeg kharash la'abhishek . So [facility] [phone].    ATENCIÓN: Si habla español, tiene a melgar disposición servicios gratuitos de asistencia lingüística. [facility] [phone].    We comply with applicable federal civil rights laws and [place] laws. We do not discriminate on the basis of race, color, national origin, age, disability, sex, sexual orientation, or gender identity.            Thank you!     Thank you for choosing [facility]  for your care. Our goal is always to provide you with excellent care. Hearing back from our patients is one way we can continue to improve our services. Please take a few minutes to complete the written survey that you may receive in the mail after your visit with us. Thank you!             Your Updated Medication List - Protect others around you: Learn how to safely use, store and throw away your medicines at www.disposemymeds.org.          This list is accurate as of 6/12/18 10:41 AM.  Always use your most recent med list.                   Brand Name Dispense Instructions for use Diagnosis    cyclobenzaprine 10 MG tablet    FLEXERIL    14 tablet    Take 1 tablet (10 mg) by mouth 3 times daily as needed for muscle spasms    Acute bilateral low back pain, with sciatica presence unspecified       ferrous sulfate 325 (65 Fe) MG tablet    IRON     Take by mouth daily (with breakfast)        levothyroxine 137 MCG tablet    SYNTHROID/LEVOTHROID    90 tablet    TAKE 1 TABLET (137  MCG) BY MOUTH DAILY    Hypothyroidism following radioiodine therapy       naproxen 500 MG tablet    NAPROSYN    30 tablet    Take 1 tablet (500 mg) by mouth 2 times daily as needed for moderate pain    Acute bilateral low back pain, with sciatica presence unspecified       norethindrone-ethinyl estradiol 1-35 MG-MCG per tablet    ORTHO-NOVUM 1-35 TAB,NORTREL 1-35 TAB    84 tablet    Take 1 tablet by mouth daily    Excessive or frequent menstruation       PRENATAL 1 PO      Take 1 tablet by mouth daily

## 2018-06-12 NOTE — LETTER
June 12, 2018      Liane Key  [address]  [place] [address]        To Whom It May Concern:    Liane Key was seen in our clinic. Please excuse her absence from work today and tomorrow for medical reasons. She may return to work without restrictions.      Sincerely,        Glenis Trinidad MD

## 2018-06-12 NOTE — PROGRESS NOTES
"  SUBJECTIVE:   Liane Key is a 48 year old female who presents to clinic today for the following health issues:    Back Pain       Duration: 3 days        Specific cause: none    Description:   Location of pain: low back bilateral  Character of pain: sharp  Pain radiation:none  New numbness or weakness in legs, not attributed to pain:  no     Intensity: Currently 10/10    History:   Pain interferes with job: YES  History of back problems: no prior back problems  Any previous MRI or X-rays: None  Sees a specialist for back pain:  No  Therapies tried without relief: acetaminophen (Tylenol) and heat    Alleviating factors:   Improved by: none      Precipitating factors:  Worsened by: Lifting, Bending and turning    Functional and Psychosocial Screen ([place] STarT Back):      Not performed today    Accompanying Signs & Symptoms:  Risk of Fracture:  None  Risk of Cauda Equina:  None  Risk of Infection:  None  Risk of Cancer:  None  Risk of Ankylosing Spondylitis:  Onset at age <35, male, AND morning back stiffness. no     ROS:  CONSTITUTIONAL: NEGATIVE for fever, chills, change in weight  GI: NEGATIVE for change in bowel movement    female: menses: menorrhagia   MUSCULOSKELETAL:back pain  NEURO: NEGATIVE for weakness, dizziness or paresthesias    OBJECTIVE:     BP (!) 138/92  Pulse 100  Temp 97.8  F (36.6  C) (Oral)  Resp 20  Ht 5' 7.75\" (1.721 m)  Wt 207 lb (93.9 kg)  LMP 06/07/2018  SpO2 98%  Breastfeeding? No  BMI 31.71 kg/m2  Body mass index is 31.71 kg/(m^2).  GENERAL: alert, no distress and obese  RESP: lungs clear to auscultation - no rales, rhonchi or wheezes  CV: regular rate and rhythm, normal S1 S2, no S3 or S4, no murmur, click or rub   MS: no gross musculoskeletal defects noted, no edema; negative straight leg raise bilaterally   NEURO: Normal strength and tone, mentation intact and speech normal  PSYCH: mentation appears normal, affect normal/bright    Diagnostic Test Results:  Results for " orders placed or performed in visit on 06/12/18   UA reflex to Microscopic and Culture   Result Value Ref Range    Color Urine Red     Appearance Urine Cloudy     Glucose Urine Negative NEG^Negative mg/dL    Bilirubin Urine Negative NEG^Negative    Ketones Urine Trace (A) NEG^Negative mg/dL    Specific Gravity Urine >1.030 1.003 - 1.035    Blood Urine Moderate (A) NEG^Negative    pH Urine 6.0 5.0 - 7.0 pH    Protein Albumin Urine 30 (A) NEG^Negative mg/dL    Urobilinogen Urine 0.2 0.2 - 1.0 EU/dL    Nitrite Urine Negative NEG^Negative    Leukocyte Esterase Urine Negative NEG^Negative    Source Midstream Urine    Urine Microscopic   Result Value Ref Range    WBC Urine 5-10 (A) OTO5^0 - 5 /HPF    RBC Urine >100 (A) OTO2^O - 2 /HPF    Squamous Epithelial /LPF Urine Few FEW^Few /LPF       ASSESSMENT/PLAN:   (M54.5) Acute bilateral low back pain, with sciatica presence unspecified  (primary encounter diagnosis)  Comment: suspect musculoligamentous cause   Plan: UA reflex to Microscopic and Culture, naproxen         (NAPROSYN) 500 MG tablet, cyclobenzaprine         (FLEXERIL) 10 MG tablet, Urine Microscopic,         Urine Culture Aerobic Bacterial        Over the counter pain medication as needed, ice or heat as she finds helpful, avoidance of aggravating activities, and return for persistence for consideration of further imaging or referral.     (R03.0) Elevated blood pressure reading without diagnosis of hypertension  Plan: Follow up ancillary blood pressure recheck in one month or sooner for worsening of symptoms or side effects.      (D50.9) Iron deficiency anemia, unspecified iron deficiency anemia type  Comment: due to menorrhagia, hysterectomy planned       See Patient Instructions    Glenis Trinidad MD  [facility]

## 2018-06-12 NOTE — PATIENT INSTRUCTIONS
[facility]    If you have any questions regarding to your visit please contact your care team:       Team Red:   Clinic Hours Telephone Number   Dr. Glenis Gusman NP   7am-7pm  Monday - Thursday   7am-5pm  Fridays  [phone]  (Appointment scheduling available 24/7)    Questions about your recent visit?   Team Line  [phone]   Urgent Care - [place] and [facility] - 11am-9pm Monday-Friday Saturday-Sunday- 9am-5pm   [place] - 5pm-9pm Monday-Friday Saturday-Sunday- 9am-5pm  [phone] - [place]  [phone] - [place]       What options do I have for a visit other than an office visit? We offer electronic visits (e-visits) and telephone visits, when medically appropriate.  Please check with your medical insurance to see if these types of visits are covered, as you will be responsible for any charges that are not paid by your insurance.      You can use [employer] (secure electronic communication) to access to your chart, send your primary care provider a message, or make an appointment. Ask a team member how to get started.     For a price quote for your services, please call our Consumer Price Line at [phone] or our Imaging Cost estimation line at [phone] (for imaging tests).      Back Care Tips    Caring for your back  These are things you can do to prevent a recurrence of acute back pain and to reduce symptoms from chronic back pain:    Maintain a healthy weight. If you are overweight, losing weight will help most types of back pain.    Exercise is an important part of recovery from most types of back pain. The muscles behind and in front of the spine support the back. This means strengthening both the back muscles and the abdominal muscles will provide better support for your spine.     Swimming and brisk walking are good overall exercises to improve your fitness level.    Practice safe lifting methods  (below).    Practice good posture when sitting, standing and walking. Avoid prolonged sitting. This puts more stress on the lower back than standing or walking.    Wear quality shoes with sufficient arch support. Foot and ankle alignment can affect back symptoms. Women should avoid wearing high heels.    Therapeutic massage can help relax the back muscles without stretching them.    During the first 24 to 72 hours after an acute injury or flare-up of chronic back pain, apply an ice pack to the painful area for 20 minutes and then remove it for 20 minutes, over a period of 60 to 90 minutes, or several times a day. As a safety precaution, do not use a heating pad at bedtime. Sleeping on a heating pad can lead to skin burns or tissue damage.    You can alternate ice and heat therapies.  Medicines  Talk to your healthcare provider before using medicines, especially if you have other medical problems or are taking other medicines.    You may use acetaminophen or ibuprofen to control pain, unless your healthcare provider prescribed other pain medicine. If you have chronic conditions like diabetes, liver or kidney disease, stomach ulcers, or gastrointestinal bleeding, or are taking blood thinners, talk with your healthcare provider before taking any medicines.    Be careful if you are given prescription pain medicines, narcotics, or medicine for muscle spasm. They can cause drowsiness, affect your coordination, reflexes, and judgment. Do not drive or operate heavy machinery while taking these types of medicines. Take prescription pain medicine only as prescribed by your healthcare provider.  Lumbar stretch  Here is a simple stretching exercise that will help relax muscle spasm and keep your back more limber. If exercise makes your back pain worse, don t do it.    Lie on your back with your knees bent and both feet on the ground.    Slowly raise your left knee to your chest as you flatten your lower back against the floor.  Hold for 5 seconds.    Relax and repeat the exercise with your right knee.    Do 10 of these exercises for each leg.  Safe lifting method    Don t bend over at the waist to lift an object off the floor.  Instead, bend your knees and hips in a squat.     Keep your back and head upright    Hold the object close to your body, directly in front of you.    Straighten your legs to lift the object.     Lower the object to the floor in the reverse fashion.    If you must slide something across the floor, push it.  Posture tips  Sitting  Sit in chairs with straight backs or low-back support. Keep your knees lower than your hips, with your feet flat on the floor.  When driving, sit up straight. Adjust the seat forward so you are not leaning toward the steering wheel.  A small pillow or rolled towel behind your lower back may help if you are driving long distances.   Standing  When standing for long periods, shift most of your weight to one leg at a time. Alternate legs every few minutes.   Sleeping  The best way to sleep is on your side with your knees bent. Put a low pillow under your head to support your neck in a neutral spine position. Avoid thick pillows that bend your neck to one side. Put a pillow between your legs to further relax your lower back. If you sleep on your back, put pillows under your knees to support your legs in a slightly flexed position. Use a firm mattress. If your mattress sags, replace it, or use a 1/2-inch plywood board under the mattress to add support.  Follow-up care  Follow up with your healthcare provider, or as advised.  If X-rays, a CT scan or an MRI scan were taken, they will be reviewed by a radiologist. You will be notified of any new findings that may affect your care.  Call 911  Call 911 if any of the following occur:    Trouble breathing    Confusion    Very drowsy    Fainting or loss of consciousness    Rapid or very slow heart rate    Loss of  bowel or bladder control  When to seek  medical advice  Call your healthcare provider right away if any of the following occur:    Pain becomes worse or spreads to your arms or legs    Weakness or numbness in one or both arms or legs    Numbness in the groin area  Date Last Reviewed: 6/1/2016 2000-2017 [employer]. [address] [place] [address]. All rights reserved. This information is not intended as a substitute for professional medical care. Always follow your healthcare professional's instructions.      Discharged by KATIE Hoyt MA

## 2018-06-12 NOTE — LETTER
[facility]  [address] [place] [address]    June 13, 2018    Liane Key  [address]  [place] [address]      Dear Liane,    No infection was seen. The blood in your sample could be from menstruation. If you were not having menstrual bleeding at the time of your visit, follow-up in clinic for recheck. Call or return to clinic as needed if these symptoms worsen or fail to improve as anticipated.     Enclosed is a copy of your results.   Results for orders placed or performed in visit on 06/12/18   UA reflex to Microscopic and Culture   Result Value Ref Range    Color Urine Red     Appearance Urine Cloudy     Glucose Urine Negative NEG^Negative mg/dL    Bilirubin Urine Negative NEG^Negative    Ketones Urine Trace (A) NEG^Negative mg/dL    Specific Gravity Urine >1.030 1.003 - 1.035    Blood Urine Moderate (A) NEG^Negative    pH Urine 6.0 5.0 - 7.0 pH    Protein Albumin Urine 30 (A) NEG^Negative mg/dL    Urobilinogen Urine 0.2 0.2 - 1.0 EU/dL    Nitrite Urine Negative NEG^Negative    Leukocyte Esterase Urine Negative NEG^Negative    Source Midstream Urine    Urine Microscopic   Result Value Ref Range    WBC Urine 5-10 (A) OTO5^0 - 5 /HPF    RBC Urine >100 (A) OTO2^O - 2 /HPF    Squamous Epithelial /LPF Urine Few FEW^Few /LPF   Urine Culture Aerobic Bacterial   Result Value Ref Range    Specimen Description Midstream Urine     Culture Micro       10,000 to 50,000 colonies/mL  mixed urogenital bushra  Susceptibility testing not routinely done         If you have any questions or concerns, please call myself or my nurse at [phone].    Sincerely,    Glenis Trinidad MD/monse

## 2018-06-13 LAB
BACTERIA SPEC CULT: NORMAL
SPECIMEN SOURCE: NORMAL

## 2018-06-13 NOTE — PROGRESS NOTES
Mail letter:  No infection was seen. The blood in your sample could be from menstruation. If you were not having menstrual bleeding at the time of your visit, follow-up in clinic for recheck. Call or return to clinic as needed if these symptoms worsen or fail to improve as anticipated.   Glenis Trinidad MD

## 2018-06-26 ENCOUNTER — OFFICE VISIT (OUTPATIENT)
Dept: FAMILY MEDICINE | Facility: CLINIC | Age: 48
End: 2018-06-26
Payer: COMMERCIAL

## 2018-06-26 VITALS
WEIGHT: 214.4 LBS | BODY MASS INDEX: 32.84 KG/M2 | TEMPERATURE: 97.4 F | RESPIRATION RATE: 16 BRPM | DIASTOLIC BLOOD PRESSURE: 78 MMHG | HEART RATE: 90 BPM | SYSTOLIC BLOOD PRESSURE: 124 MMHG

## 2018-06-26 DIAGNOSIS — D50.9 IRON DEFICIENCY ANEMIA, UNSPECIFIED IRON DEFICIENCY ANEMIA TYPE: ICD-10-CM

## 2018-06-26 DIAGNOSIS — Z01.818 PREOP GENERAL PHYSICAL EXAM: Primary | ICD-10-CM

## 2018-06-26 DIAGNOSIS — E89.0 HYPOTHYROIDISM FOLLOWING RADIOIODINE THERAPY: ICD-10-CM

## 2018-06-26 LAB
BETA HCG QUAL IFA URINE: NEGATIVE
HGB BLD-MCNC: 10.5 G/DL (ref 11.7–15.7)

## 2018-06-26 PROCEDURE — 99214 OFFICE O/P EST MOD 30 MIN: CPT | Performed by: FAMILY MEDICINE

## 2018-06-26 PROCEDURE — 36415 COLL VENOUS BLD VENIPUNCTURE: CPT | Performed by: FAMILY MEDICINE

## 2018-06-26 PROCEDURE — 84703 CHORIONIC GONADOTROPIN ASSAY: CPT | Performed by: FAMILY MEDICINE

## 2018-06-26 PROCEDURE — 85018 HEMOGLOBIN: CPT | Performed by: FAMILY MEDICINE

## 2018-06-26 NOTE — PROGRESS NOTES
[facility]  [address] [place] [address]  [place] [address]  [phone]  Dept: [phone]    PRE-OP EVALUATION:  Today's date: 2018    Liane Key (: 1970) presents for pre-operative evaluation assessment as requested by Dr. Fox.  She requires evaluation and anesthesia risk assessment prior to undergoing surgery/procedure for treatment of Laparoscopic Assisted Vaginal Hysterectomy.    Proposed Surgery/ Procedure: Hysterectomy  Date of Surgery/ Procedure: 2018  Time of Surgery/ Procedure: 1:30  Hospital/Surgical Facility: [facility]  Fax number for surgical facility:   Primary Physician: Nahomi Castañeda  Type of Anesthesia Anticipated: General    Patient has a Health Care Directive or Living Will:  NO    1. NO - Do you have a history of heart attack, stroke, stent, bypass or surgery on an artery in the head, neck, heart or legs?  2. NO - Do you ever have any pain or discomfort in your chest?  3. NO - Do you have a history of  Heart Failure?  4. NO - Are you troubled by shortness of breath when: walking on the level, up a slight hill or at night?  5. NO - Do you currently have a cold, bronchitis or other respiratory infection?  6. NO - Do you have a cough, shortness of breath or wheezing?  7. NO - Do you sometimes get pains in the calves of your legs when you walk?  8. NO - Do you or anyone in your family have previous history of blood clots?  9. NO - Do you or does anyone in your family have a serious bleeding problem such as prolonged bleeding following surgeries or cuts?  10. YES - HAVE YOU EVER HAD PROBLEMS WITH ANEMIA OR BEEN TOLD TO TAKE IRON PILLS? Taking iron pills  11. NO - Have you had any abnormal blood loss such as black, tarry or bloody stools, or abnormal vaginal bleeding?  12. NO - Have you ever had a blood transfusion?  13. NO - Have you or any of your relatives ever had problems with anesthesia?  14. NO - Do you have sleep apnea, excessive snoring or daytime  drowsiness?  15. NO - Do you have any prosthetic heart valves?  16. NO - Do you have prosthetic joints?  17. NO - Is there any chance that you may be pregnant?      HPI:     HPI related to upcoming procedure: pt has Menorrhagia with Irregular cycles  She is scheduled for above surgery      HYPOTHYROIDISM - Patient has a longstanding history of chronic Hypothyroidism. Patient has been doing well, noting no tremor, insomnia, hair loss or changes in skin texture. Continues to take medications as directed, without adverse reactions or side effects. Last TSH   Lab Results   Component Value Date    TSH 4.44 (H) 10/12/2017   .                                                                                                                                                                                                                        .    MEDICAL HISTORY:     Patient Active Problem List    Diagnosis Date Noted     Elevated blood pressure reading without diagnosis of hypertension 06/12/2018     Priority: Medium     Obesity      Priority: Medium     Abnormal uterine bleeding 05/09/2018     Priority: Medium     Iron deficiency anemia, unspecified iron deficiency anemia type 04/05/2018     Priority: Medium     Hypothyroidism following radioiodine therapy 09/20/2012     Priority: Medium     Graves disease 04/05/2012     Priority: Medium     CARDIOVASCULAR SCREENING; LDL GOAL LESS THAN 160 03/01/2012     Priority: Medium      Past Medical History:   Diagnosis Date     Graves disease      Hypothyroidism following radioiodine therapy      Obesity      Syphilis 5/8/2015     Past Surgical History:   Procedure Laterality Date     CHOLECYSTECTOMY  1998          OPEN REDUCTION INTERNAL FIXATION ANKLE Left 1990          TUBAL LIGATION       Current Outpatient Prescriptions   Medication Sig Dispense Refill     cyclobenzaprine (FLEXERIL) 10 MG tablet Take 1 tablet (10 mg) by mouth 3 times daily as needed for muscle spasms 14 tablet 1      ferrous sulfate (IRON) 325 (65 Fe) MG tablet Take by mouth daily (with breakfast)       levothyroxine (SYNTHROID/LEVOTHROID) 137 MCG tablet TAKE 1 TABLET (137 MCG) BY MOUTH DAILY 90 tablet 3     naproxen (NAPROSYN) 500 MG tablet Take 1 tablet (500 mg) by mouth 2 times daily as needed for moderate pain 30 tablet 1     norethindrone-ethinyl estradiol (ORTHO-NOVUM 1-35 TAB,NORTREL 1-35 TAB) 1-35 MG-MCG per tablet Take 1 tablet by mouth daily 84 tablet 4     Prenatal MV-Min-Fe Fum-FA-DHA (PRENATAL 1 PO) Take 1 tablet by mouth daily       OTC products: None, except as noted above    No Known Allergies   Latex Allergy: NO    Social History   Substance Use Topics     Smoking status: Former Smoker     Packs/day: 0.20     Years: 5.00     Types: Cigarettes     Quit date: 5/30/2012     Smokeless tobacco: Never Used     Alcohol use Yes      Comment: occasional      History   Drug Use     Yes     Comment: marjuania - smokes it for appetite      Social History     Social History     Marital status: Single     Spouse name: N/A     Number of children: 4     Years of education: N/A     Occupational History     works with [employer]  [employer]     Social History Main Topics     Smoking status: Former Smoker     Packs/day: 0.20     Years: 5.00     Types: Cigarettes     Quit date: 5/30/2012     Smokeless tobacco: Never Used     Alcohol use Yes      Comment: occasional      Drug use: Yes      Comment: marjuania - smokes it for appetite      Sexual activity: Yes     Partners: Male     Birth control/ protection: Female Surgical, Pill     Other Topics Concern     Parent/Sibling W/ Cabg, Mi Or Angioplasty Before 65f 55m? No     Social History Narrative    Lives with her 2 grand kids at home.      Family History   Problem Relation Age of Onset     Cancer - colorectal Mother      Unknown/Adopted Maternal Grandmother      Unknown/Adopted Maternal Grandfather      Unknown/Adopted Paternal Grandmother      Unknown/Adopted  Paternal Grandfather      Depression Sister      Thyroid Disease Maternal Aunt      Cancer No family hx of      Diabetes No family hx of      Hypertension No family hx of      Cerebrovascular Disease No family hx of      Glaucoma No family hx of      Macular Degeneration No family hx of        REVIEW OF SYSTEMS:   CONSTITUTIONAL: NEGATIVE for fever, chills, change in weight  INTEGUMENTARY/SKIN: NEGATIVE for worrisome rashes, moles or lesions  EYES: NEGATIVE for vision changes or irritation  ENT/MOUTH: NEGATIVE for ear, mouth and throat problems  RESP: NEGATIVE for significant cough or SOB  BREAST: NEGATIVE for masses, tenderness or discharge  CV: NEGATIVE for chest pain, palpitations or peripheral edema  GI: NEGATIVE for nausea, abdominal pain, heartburn, or change in bowel habits   female: menorrhagia  MUSCULOSKELETAL: NEGATIVE for significant arthralgias or myalgia  NEURO: NEGATIVE for weakness, dizziness or paresthesias  ENDOCRINE: NEGATIVE for temperature intolerance, skin/hair changes  HEME: NEGATIVE for bleeding problems  PSYCHIATRIC: NEGATIVE for changes in mood or affect    EXAM:   /78  Pulse 90  Temp 97.4  F (36.3  C) (Oral)  Resp 16  Wt 214 lb 6.4 oz (97.3 kg)  LMP 06/07/2018  BMI 32.84 kg/m2   LMP 6-7-18    GENERAL APPEARANCE: healthy, alert and no distress     EYES: EOMI, PERRL     HENT: ear canals and TM's normal and nose and mouth without ulcers or lesions     NECK: no adenopathy, no asymmetry, masses, or scars and thyroid normal to palpation     RESP: lungs clear to auscultation - no rales, rhonchi or wheezes     CV: regular rates and rhythm, normal S1 S2, no S3 or S4 and no murmur, click or rub     ABDOMEN:  soft, nontender, no HSM or masses and bowel sounds normal     MS: extremities normal- no gross deformities noted, no evidence of inflammation in joints, FROM in all extremities.     SKIN: no suspicious lesions or rashes     NEURO: Normal strength and tone, sensory exam grossly  normal, mentation intact and speech normal     PSYCH: mentation appears normal. and affect normal/bright     LYMPHATICS: No cervical adenopathy    DIAGNOSTICS:   Labs pending     Recent Labs   Lab Test  04/05/18   1837  08/15/16   1151   08/28/14   1601  11/13/13   1655   06/04/12   1222   HGB  9.2*  11.2*   < >   --    --    < >  12.3   PLT  334  257   < >   --    --    < >  251   NA   --    --    --    --   138   --   139   POTASSIUM   --    --    --    --   4.0   --   4.0   CR   --    --    --    --   0.87   --   0.65   A1C   --    --    --   4.8   --    --    --     < > = values in this interval not displayed.        IMPRESSION:   Reason for surgery/procedure: as above  Diagnosis/reason for consult: preop    The proposed surgical procedure is considered INTERMEDIATE risk.    REVISED CARDIAC RISK INDEX  The patient has the following serious cardiovascular risks for perioperative complications such as (MI, PE, VFib and 3  AV Block):  No serious cardiac risks  INTERPRETATION: 0 risks: Class I (very low risk - 0.4% complication rate)    The patient has the following additional risks for perioperative complications:  No identified additional risks      ICD-10-CM    1. Preop general physical exam Z01.818 Hemoglobin     Beta HCG Qual, Urine - [facility] ([id number])   2. Hypothyroidism following radioiodine therapy E89.0    3. Iron deficiency anemia, unspecified iron deficiency anemia type D50.9        RECOMMENDATIONS:     APPROVAL GIVEN to proceed with proposed procedure,  Labs pending        Signed Electronically by: Nahomi Castañeda MD    Copy of this evaluation report is provided to requesting physician.    [facility] Preop Guidelines    Revised Cardiac Risk Index

## 2018-06-26 NOTE — MR AVS SNAPSHOT
After Visit Summary   6/26/2018    Liane Key    MRN: [id number]           Patient Information     Date Of Birth          1970        Visit Information        Provider Department      6/26/2018 5:20 PM Nahomi Castañeda MD [facility]        Today's Diagnoses     Preop general physical exam    -  1    Hypothyroidism following radioiodine therapy        Iron deficiency anemia, unspecified iron deficiency anemia type          Care Instructions      Before Your Surgery      Call your surgeon if there is any change in your health. This includes signs of a cold or flu (such as a sore throat, runny nose, cough, rash or fever).    Do not smoke, drink alcohol or take over the counter medicine (unless your surgeon or primary care doctor tells you to) for the 24 hours before and after surgery.    If you take prescribed drugs: Follow your doctor s orders about which medicines to take and which to stop until after surgery.    Eating and drinking prior to surgery: follow the instructions from your surgeon    Take a shower or bath the night before surgery. Use the soap your surgeon gave you to gently clean your skin. If you do not have soap from your surgeon, use your regular soap. Do not shave or scrub the surgery site.  Wear clean pajamas and have clean sheets on your bed.     [facility]    If you have any questions regarding to your visit please contact your care team:       Team Red:   Clinic Hours Telephone Number   Dr. Glenis Gusman, NP   7am-7pm  Monday - Thursday   7am-5pm  Fridays  [phone]  (Appointment scheduling available 24/7)    Questions about your recent visit?   Team Line  [phone]   Urgent Care - [place] - 11am-9pm Monday-Friday Saturday-Sunday- 9am-5pm   [place] - 5pm-9pm Monday-Friday Saturday-Sunday- 9am-5pm  [phone] - [place]  [phone] - [place]       What  options do I have for a visit other than an office visit? We offer electronic visits (e-visits) and telephone visits, when medically appropriate.  Please check with your medical insurance to see if these types of visits are covered, as you will be responsible for any charges that are not paid by your insurance.      You can use Nextiva (secure electronic communication) to access to your chart, send your primary care provider a message, or make an appointment. Ask a team member how to get started.     For a price quote for your services, please call our Consumer Price Line at [phone] or our Imaging Cost estimation line at [phone] (for imaging tests).              Follow-ups after your visit        Your next 10 appointments already scheduled     Aug 24, 2018  9:00 AM CDT   Post Op with Eliseo Fox MD   [facility] ([facility])    [address] [place] [address] [phone]              Who to contact     If you have questions or need follow up information about today's clinic visit or your schedule please contact [facility] directly at [phone].  Normal or non-critical lab and imaging results will be communicated to you by MyChart, letter or phone within 4 business days after the clinic has received the results. If you do not hear from us within 7 days, please contact the clinic through [facility] or phone. If you have a critical or abnormal lab result, we will notify you by phone as soon as possible.  Submit refill requests through Nextiva or call your pharmacy and they will forward the refill request to us. Please allow 3 business days for your refill to be completed.          Additional Information About Your Visit        Care EveryWhere ID     This is your Care EveryWhere ID. This could be used by other organizations to access your [facility] medical records  [id number]        Your Vitals Were     Pulse Temperature Respirations Last  Period BMI (Body Mass Index)       90 97.4  F (36.3  C) (Oral) 16 06/07/2018 32.84 kg/m2        Blood Pressure from Last 3 Encounters:   06/26/18 124/78   06/12/18 (!) 138/92   05/22/18 130/80    Weight from Last 3 Encounters:   06/26/18 214 lb 6.4 oz (97.3 kg)   06/12/18 207 lb (93.9 kg)   05/22/18 210 lb (95.3 kg)              We Performed the Following     Beta HCG Qual, Urine - [facility] ([id number])     Hemoglobin        Primary Care Provider Office Phone # Fax #    Nahomi Castañeda [phone] [address] [place] [address]        Equal Access to Services     AIDEE QUACH : Hadii aad ku hadasho Sosully, waaxda luqadaha, qaybta kaalmada adeegyada, [facility] . So [facility] [phone].    ATENCIÓN: Si habla español, tiene a melgar disposición servicios gratuitos de asistencia lingüística. Llame al [phone].    We comply with applicable federal civil rights laws and [place] laws. We do not discriminate on the basis of race, color, national origin, age, disability, sex, sexual orientation, or gender identity.            Thank you!     Thank you for choosing [facility]  for your care. Our goal is always to provide you with excellent care. Hearing back from our patients is one way we can continue to improve our services. Please take a few minutes to complete the written survey that you may receive in the mail after your visit with us. Thank you!             Your Updated Medication List - Protect others around you: Learn how to safely use, store and throw away your medicines at www.disposemymeds.org.          This list is accurate as of 6/26/18  6:11 PM.  Always use your most recent med list.                   Brand Name Dispense Instructions for use Diagnosis    cyclobenzaprine 10 MG tablet    FLEXERIL    14 tablet    Take 1 tablet (10 mg) by mouth 3 times daily as needed for muscle spasms    Acute bilateral low back pain, with sciatica  presence unspecified       ferrous sulfate 325 (65 Fe) MG tablet    IRON     Take by mouth daily (with breakfast)        levothyroxine 137 MCG tablet    SYNTHROID/LEVOTHROID    90 tablet    TAKE 1 TABLET (137 MCG) BY MOUTH DAILY    Hypothyroidism following radioiodine therapy       naproxen 500 MG tablet    NAPROSYN    30 tablet    Take 1 tablet (500 mg) by mouth 2 times daily as needed for moderate pain    Acute bilateral low back pain, with sciatica presence unspecified       norethindrone-ethinyl estradiol 1-35 MG-MCG per tablet    ORTHO-NOVUM 1-35 TAB,NORTREL 1-35 TAB    84 tablet    Take 1 tablet by mouth daily    Excessive or frequent menstruation       PRENATAL 1 PO      Take 1 tablet by mouth daily

## 2018-06-26 NOTE — PATIENT INSTRUCTIONS
Before Your Surgery      Call your surgeon if there is any change in your health. This includes signs of a cold or flu (such as a sore throat, runny nose, cough, rash or fever).    Do not smoke, drink alcohol or take over the counter medicine (unless your surgeon or primary care doctor tells you to) for the 24 hours before and after surgery.    If you take prescribed drugs: Follow your doctor s orders about which medicines to take and which to stop until after surgery.    Eating and drinking prior to surgery: follow the instructions from your surgeon    Take a shower or bath the night before surgery. Use the soap your surgeon gave you to gently clean your skin. If you do not have soap from your surgeon, use your regular soap. Do not shave or scrub the surgery site.  Wear clean pajamas and have clean sheets on your bed.     [facility]    If you have any questions regarding to your visit please contact your care team:       Team Red:   Clinic Hours Telephone Number   Dr. Glenis Gusman, NP   7am-7pm  Monday - Thursday   7am-5pm  Fridays  [phone]  (Appointment scheduling available 24/7)    Questions about your recent visit?   Team Line  [phone]   Urgent Care - [place] and [facility] - 11am-9pm Monday-Friday Saturday-Sunday- 9am-5pm   [place] - 5pm-9pm Monday-Friday Saturday-Sunday- 9am-5pm  [phone] - [place]  [phone] - [place]       What options do I have for a visit other than an office visit? We offer electronic visits (e-visits) and telephone visits, when medically appropriate.  Please check with your medical insurance to see if these types of visits are covered, as you will be responsible for any charges that are not paid by your insurance.      You can use Transmit (secure electronic communication) to access to your chart, send your primary care provider a message, or make an appointment. Ask a team  member how to get started.     For a price quote for your services, please call our Consumer Price Line at [phone] or our Imaging Cost estimation line at [phone] (for imaging tests).

## 2018-07-06 ENCOUNTER — TELEPHONE (OUTPATIENT)
Dept: OBGYN | Facility: CLINIC | Age: 48
End: 2018-07-06

## 2018-07-06 NOTE — TELEPHONE ENCOUNTER
Reason for Call:  Other returning call    Detailed comments: patient returning call. Please contact patient about forms.    Phone Number Patient can be reached at: Home number on file [phone] (home)    Best Time: any time    Can we leave a detailed message on this number? YES    Call taken on 7/6/2018 at 2:44 PM by Ilinaa Spivey

## 2018-07-06 NOTE — TELEPHONE ENCOUNTER
FMLA forms were faxed to department.  ELVIA Fischer 7/6/2018       I tried reaching patient I got voicemail to return call to women's regarding forms.    Need to clarify how long off.  Dr. Fox is thinking 4 weeks.  Post op at that time is all she would need.    I faxed form to Deena at [facility].  ELVIA Fischer 7/6/2018

## 2018-07-10 NOTE — TELEPHONE ENCOUNTER
Reason for Call:  Other call back    Detailed comments: patient checking the status of this call, and asking for a return call. Patient not sure why no one has called her back yet. Please contact patient.    Phone Number Patient can be reached at: Home number on file [phone] (home)    Best Time: any time    Can we leave a detailed message on this number? YES    Call taken on 7/10/2018 at 1:34 PM by Iliana Spivey

## 2018-07-10 NOTE — TELEPHONE ENCOUNTER
Return call to patient- received forms.  Patient states Dr. Fox informed patient she would need 6 weeks off after surgery.  Per forms- Dr. Fox had recommended 4 weeks.  Patient would like us to clarify with provider.  Once clarified- can fax forms to [phone].

## 2018-07-11 NOTE — TELEPHONE ENCOUNTER
Forms completed, signed & faxed to [phone].  Copy of forms sent for scanning.  Patient notified & pleased.

## 2018-07-13 ENCOUNTER — TRANSFERRED RECORDS (OUTPATIENT)
Dept: HEALTH INFORMATION MANAGEMENT | Facility: CLINIC | Age: 48
End: 2018-07-13

## 2018-07-19 ENCOUNTER — TELEPHONE (OUTPATIENT)
Dept: OBGYN | Facility: CLINIC | Age: 48
End: 2018-07-19

## 2018-07-19 DIAGNOSIS — G89.18 ACUTE POST-OPERATIVE PAIN: Primary | ICD-10-CM

## 2018-07-19 DIAGNOSIS — K59.03 DRUG-INDUCED CONSTIPATION: ICD-10-CM

## 2018-07-19 RX ORDER — OXYCODONE AND ACETAMINOPHEN 5; 325 MG/1; MG/1
.5-1 TABLET ORAL EVERY 6 HOURS PRN
Qty: 12 TABLET | Refills: 0 | Status: SHIPPED | OUTPATIENT
Start: 2018-07-19 | End: 2018-08-15

## 2018-07-19 RX ORDER — DOCUSATE CALCIUM 240 MG
240 CAPSULE ORAL 2 TIMES DAILY
Qty: 10 CAPSULE | Refills: 1 | Status: SHIPPED | OUTPATIENT
Start: 2018-07-19 | End: 2018-08-15

## 2018-07-19 NOTE — TELEPHONE ENCOUNTER
Phone call to patient. Explained Rx's as below. Explained Dr. Fox wants patient to wean off Percocet and take  mg alternating. Patient agreed to follow plan and patient will have   Rx's today at [facility]. Gave patient address and directions to have  use [address] entrance. Patient appreciative of assistance.   Placed Rx's at .  Fatmata Nation RN, BAN

## 2018-07-19 NOTE — TELEPHONE ENCOUNTER
Prescription for the Percocet printed and the Surfak since she has not had much BM since surgery.  The pain might be associated with Narcotic use.

## 2018-07-19 NOTE — TELEPHONE ENCOUNTER
"Noted patient had lap vaginal hysterectomy on 07-13-18.   oxyCODONE-acetaminophen (PERCOCET) 5-325 mg oral tablet 20 tablet 0 7/13/2018  --   Sig - Route: Take 0.5-2 tablets by mouth every four (4) to six (6) hours as needed for Pain (pain). for pain - oral     6 week post-op scheduled with Dr. Fox on 08-24-18.    Phone call to patient. It was very difficult to hear patient as her phone was very muffled and voice cut out on & off. This RN requested several times to have patient speak into phone directly and was not able to resolve issue. Patient reported she took last Percocet this morning at 0700. She stated she has been taking Percocet 2 tabs tid since surgery. She stated on 07-15-18 she attempted to take Percocet 1 tab and it did not alleviate pain so she took one Percocet one hour later. Patient only tried  mg one time also on 07-15-18 and did not notice any pain relief so she has not tried again. Patient reported post surgical abdominal pain gets as high as 8/10 and then Percocet relieves pain-unable to give a number as she falls asleep. Patient reports she is mostly resting at home and occasionally ambulating \"so I don't get blood clots.\" Patient reports has not had a regular BM yet and reported constipation, only passing very small amt of stool. Explained that narcotics will constipate patient and recommended she take Colace prn to help as stool softener as well at drinking  oz/water/day and high fiber food. Patient also reported decreased appetite. Recommended small, frequent meals and make sure she has good source of protein for healing.   Explained will get a message to Dr. Fox to see if he can give her another small Rx of Percocet and then to start alternating with IBP. Explained she needs to take IBP with food. Explained if Dr. Fox will give narcotic Rx it has to be picked up at [facility] as that is where Dr. Fox is today. Patient not sure if her  could  Rx today at [facility] " or if patient would have to wait until tomorrow to  from Dr. Fox at [facility] tomorrow. Patient will check with  about his schedule and this RN will get a message to Dr. Fox to see if patient can have more Percocet. Explained will call back later.   Will route to Dr. Fox for review & orders. Fatmata Nation RN, BAN

## 2018-08-15 ENCOUNTER — OFFICE VISIT (OUTPATIENT)
Dept: FAMILY MEDICINE | Facility: CLINIC | Age: 48
End: 2018-08-15
Payer: COMMERCIAL

## 2018-08-15 VITALS
BODY MASS INDEX: 31.37 KG/M2 | WEIGHT: 207 LBS | TEMPERATURE: 96.8 F | HEIGHT: 68 IN | HEART RATE: 108 BPM | RESPIRATION RATE: 11 BRPM | DIASTOLIC BLOOD PRESSURE: 80 MMHG | OXYGEN SATURATION: 99 % | SYSTOLIC BLOOD PRESSURE: 108 MMHG

## 2018-08-15 DIAGNOSIS — N89.8 VAGINAL ITCHING: Primary | ICD-10-CM

## 2018-08-15 DIAGNOSIS — F51.01 PRIMARY INSOMNIA: ICD-10-CM

## 2018-08-15 DIAGNOSIS — B37.31 YEAST INFECTION OF THE VAGINA: ICD-10-CM

## 2018-08-15 LAB
ALBUMIN UR-MCNC: 30 MG/DL
APPEARANCE UR: CLEAR
BILIRUB UR QL STRIP: NEGATIVE
COLOR UR AUTO: YELLOW
GLUCOSE UR STRIP-MCNC: NEGATIVE MG/DL
HGB UR QL STRIP: NEGATIVE
KETONES UR STRIP-MCNC: NEGATIVE MG/DL
LEUKOCYTE ESTERASE UR QL STRIP: ABNORMAL
MUCOUS THREADS #/AREA URNS LPF: PRESENT /LPF
NITRATE UR QL: NEGATIVE
NON-SQ EPI CELLS #/AREA URNS LPF: ABNORMAL /LPF
PH UR STRIP: 6 PH (ref 5–7)
RBC #/AREA URNS AUTO: ABNORMAL /HPF
SOURCE: ABNORMAL
SP GR UR STRIP: 1.02 (ref 1–1.03)
SPECIMEN SOURCE: ABNORMAL
UROBILINOGEN UR STRIP-ACNC: 1 EU/DL (ref 0.2–1)
WBC #/AREA URNS AUTO: ABNORMAL /HPF
WET PREP SPEC: ABNORMAL

## 2018-08-15 PROCEDURE — 87210 SMEAR WET MOUNT SALINE/INK: CPT | Performed by: FAMILY MEDICINE

## 2018-08-15 PROCEDURE — 99213 OFFICE O/P EST LOW 20 MIN: CPT | Performed by: FAMILY MEDICINE

## 2018-08-15 PROCEDURE — 81001 URINALYSIS AUTO W/SCOPE: CPT | Performed by: FAMILY MEDICINE

## 2018-08-15 RX ORDER — LANOLIN ALCOHOL/MO/W.PET/CERES
3 CREAM (GRAM) TOPICAL
Qty: 30 TABLET | Refills: 0 | Status: SHIPPED | OUTPATIENT
Start: 2018-08-15 | End: 2019-02-19

## 2018-08-15 RX ORDER — FLUCONAZOLE 150 MG/1
150 TABLET ORAL ONCE
Qty: 1 TABLET | Refills: 0 | Status: SHIPPED | OUTPATIENT
Start: 2018-08-15 | End: 2018-08-15

## 2018-08-15 NOTE — PATIENT INSTRUCTIONS
Vaginal Infection: Yeast (Candidiasis)  Yeast infection occurs when yeast in the vagina increase and attacks the vaginal tissues. Yeast is a type of fungus. These infections are often caused by a type of yeast called Candida albicans. Other species of yeast can also cause infections. Factors that may make infection more likely include recent antibiotic use, douching, or increased sex. Yeast infections are more common in women who have diabetes, or are obese or pregnant, or have a weak immune system.  Symptoms of yeast infection    Clumpy or thin, white discharge, which may look like cottage cheese    No odor or minimal odor    Severe vaginal itching or burning    Burning with urination    Swelling, redness of vulva    Pain during sex  Treating yeast infection  Yeast infection is treated with a vaginal antifungal cream. In some cases, antifungal pills are prescribed instead. During treatment:    Finish all of your medicine, even if your symptoms go away.    Apply the cream before going to bed. Lie flat after applying so that it doesn't drip out.    Do not douche or use tampons.    Don't rely on a diaphragm or condoms, since the cream may weaken them.    Avoid intercourse if advised by your healthcare provider.     Should I treat a yeast infection myself?  Discuss with your healthcare provider whether you should use over-the-counter medicines to treat a yeast infection. Self-treatment may depend on whether:    You've had a yeast infection in the past.    You're at risk for STDs.  Call your healthcare provider if symptoms do not go away or come back after treatment.   Date Last Reviewed: 3/1/2017    4385-0958 [employer]. [address] [place] [address], [place] [address]. All rights reserved. This information is not intended as a substitute for professional medical care. Always follow your healthcare professional's instructions.

## 2018-08-15 NOTE — MR AVS SNAPSHOT
After Visit Summary   8/15/2018    Liane Key    MRN: [id number]           Patient Information     Date Of Birth          1970        Visit Information        Provider Department      8/15/2018 11:40 AM Nahomi Castañeda MD [facility]        Today's Diagnoses     Vaginal itching    -  1    Yeast infection of the vagina        Primary insomnia          Care Instructions      Vaginal Infection: Yeast (Candidiasis)  Yeast infection occurs when yeast in the vagina increase and attacks the vaginal tissues. Yeast is a type of fungus. These infections are often caused by a type of yeast called Candida albicans. Other species of yeast can also cause infections. Factors that may make infection more likely include recent antibiotic use, douching, or increased sex. Yeast infections are more common in women who have diabetes, or are obese or pregnant, or have a weak immune system.  Symptoms of yeast infection    Clumpy or thin, white discharge, which may look like cottage cheese    No odor or minimal odor    Severe vaginal itching or burning    Burning with urination    Swelling, redness of vulva    Pain during sex  Treating yeast infection  Yeast infection is treated with a vaginal antifungal cream. In some cases, antifungal pills are prescribed instead. During treatment:    Finish all of your medicine, even if your symptoms go away.    Apply the cream before going to bed. Lie flat after applying so that it doesn't drip out.    Do not douche or use tampons.    Don't rely on a diaphragm or condoms, since the cream may weaken them.    Avoid intercourse if advised by your healthcare provider.     Should I treat a yeast infection myself?  Discuss with your healthcare provider whether you should use over-the-counter medicines to treat a yeast infection. Self-treatment may depend on whether:    You've had a yeast infection in the past.    You're at risk for STDs.  Call your healthcare provider if  "symptoms do not go away or come back after treatment.   Date Last Reviewed: 3/1/2017    4095-8829 [employer]. [address] [place] [address], [place] [address]. All rights reserved. This information is not intended as a substitute for professional medical care. Always follow your healthcare professional's instructions.                Follow-ups after your visit        Your next 10 appointments already scheduled     Aug 24, 2018  9:00 AM CDT   Post Op with Eliseo Fox MD   [facility] ([address]  [place] [address] [phone]              Who to contact     If you have questions or need follow up information about today's clinic visit or your schedule please contact [facility] directly at [phone].  Normal or non-critical lab and imaging results will be communicated to you by MyChart, letter or phone within 4 business days after the clinic has received the results. If you do not hear from us within 7 days, please contact the clinic through MyChart or phone. If you have a critical or abnormal lab result, we will notify you by phone as soon as possible.  Submit refill requests through [employer] or call your pharmacy and they will forward the refill request to us. Please allow 3 business days for your refill to be completed.          Additional Information About Your Visit        Care EveryWhere ID     This is your Care EveryWhere ID. This could be used by other organizations to access your [place] medical records  [id number]        Your Vitals Were     Pulse Temperature Respirations Height Pulse Oximetry BMI (Body Mass Index)    108 96.8  F (36  C) (Oral) 11 5' 7.75\" (1.721 m) 99% 31.71 kg/m2       Blood Pressure from Last 3 Encounters:   08/15/18 108/80   06/26/18 124/78   06/12/18 (!) 138/92    Weight from Last 3 Encounters:   08/15/18 207 lb (93.9 kg)   06/26/18 214 lb 6.4 oz (97.3 kg)   06/12/18 207 lb (93.9 kg)    "           We Performed the Following     *UA reflex to Microscopic and Culture ([place] [facility] (except [place] and [place])     Urine Microscopic     Wet prep          Today's Medication Changes          These changes are accurate as of 8/15/18 12:25 PM.  If you have any questions, ask your nurse or doctor.               Start taking these medicines.        Dose/Directions    fluconazole 150 MG tablet   Commonly known as:  DIFLUCAN   Used for:  Yeast infection of the vagina   Started by:  Nahomi Castañeda MD        Dose:  150 mg   Take 1 tablet (150 mg) by mouth once for 1 dose   Quantity:  1 tablet   Refills:  0       melatonin 3 MG tablet   Used for:  Primary insomnia   Started by:  Nahomi Castañeda MD        Dose:  3 mg   Take 1 tablet (3 mg) by mouth nightly as needed for sleep   Quantity:  30 tablet   Refills:  0         Stop taking these medicines if you haven't already. Please contact your care team if you have questions.     cyclobenzaprine 10 MG tablet   Commonly known as:  FLEXERIL   Stopped by:  Nahomi Castañeda MD           docusate calcium 240 MG capsule   Commonly known as:  SURFAK   Stopped by:  Nahomi Castañeda MD           naproxen 500 MG tablet   Commonly known as:  NAPROSYN   Stopped by:  Nahomi Castañeda MD           oxyCODONE-acetaminophen 5-325 MG per tablet   Commonly known as:  PERCOCET   Stopped by:  Nahomi Castañeda MD                Where to get your medicines      These medications were sent to [facility] [place]  [place], [place] - [address] [place] [address] [address], [facility]      Phone:  [phone]     fluconazole 150 MG tablet    melatonin 3 MG tablet                Primary Care Provider Office Phone # Fax #    Nahomi Castañeda [phone]       [address] [place] [address]        Equal Access to Services     PAWEL QUACH AH: Shellie Pena, whitley reyna, moses allen  lajerica farmer. So [facility] [phone].    ATENCIÓN: Si habla aida, tiene a melgar disposición servicios gratuitos de asistencia lingüística. Coni carcamo [phone].    We comply with applicable federal civil rights laws and [place] laws. We do not discriminate on the basis of race, color, national origin, age, disability, sex, sexual orientation, or gender identity.            Thank you!     Thank you for choosing [facility]  for your care. Our goal is always to provide you with excellent care. Hearing back from our patients is one way we can continue to improve our services. Please take a few minutes to complete the written survey that you may receive in the mail after your visit with us. Thank you!             Your Updated Medication List - Protect others around you: Learn how to safely use, store and throw away your medicines at www.disposemymeds.org.          This list is accurate as of 8/15/18 12:25 PM.  Always use your most recent med list.                   Brand Name Dispense Instructions for use Diagnosis    ferrous sulfate 325 (65 Fe) MG tablet    IRON     Take by mouth daily (with breakfast)        fluconazole 150 MG tablet    DIFLUCAN    1 tablet    Take 1 tablet (150 mg) by mouth once for 1 dose    Yeast infection of the vagina       levothyroxine 137 MCG tablet    SYNTHROID/LEVOTHROID    90 tablet    TAKE 1 TABLET (137 MCG) BY MOUTH DAILY    Hypothyroidism following radioiodine therapy       melatonin 3 MG tablet     30 tablet    Take 1 tablet (3 mg) by mouth nightly as needed for sleep    Primary insomnia       norethindrone-ethinyl estradiol 1-35 MG-MCG per tablet    ORTHO-NOVUM 1-35 TAB,NORTREL 1-35 TAB    84 tablet    Take 1 tablet by mouth daily    Excessive or frequent menstruation       PRENATAL 1 PO      Take 1 tablet by mouth daily

## 2018-08-15 NOTE — PROGRESS NOTES
SUBJECTIVE:   Liane Key is a 48 year old female who presents to clinic today for the following health issues:      Vaginal Symptoms  Onset: 5 days ago    Description:  Vaginal Discharge: white   Itching (Pruritis): YES  Burning sensation:  no   Odor: no     Accompanying Signs & Symptoms:  Pain with Urination: no   Abdominal Pain: no   Fever: no     History:   Sexually active: no   New Partner: no   Possibility of Pregnancy:  No    Precipitating factors:   Recent Antibiotic Use: no     Alleviating factors:  Warm bath    Therapies Tried and outcome: see above    Insomnia  Onset: several months    Description:   Time to fall asleep (sleep latency): several Hours  Middle of night awakening:  YES  Early morning awakening:  YES    Progression of Symptoms:  same    Accompanying Signs & Symptoms:  Daytime sleepiness/napping: YES  Excessive snoring/apnea: no  Restless legs: no  Frequent urination: no      History:  Prior Insomnia: no    Precipitating factors:   New stressful situation: no  Caffeine intake: no  OTC decongestants: no  Any new medications: no    Alleviating factors:  Self medicating (alcohol, etc.):  no    Therapies Tried and outcome:         Problem list and histories reviewed & adjusted, as indicated.  Additional history: as documented    Patient Active Problem List   Diagnosis     CARDIOVASCULAR SCREENING; LDL GOAL LESS THAN 160     Graves disease     Hypothyroidism following radioiodine therapy     Iron deficiency anemia, unspecified iron deficiency anemia type     Abnormal uterine bleeding     Obesity     Elevated blood pressure reading without diagnosis of hypertension     Past Surgical History:   Procedure Laterality Date     CHOLECYSTECTOMY  1998          OPEN REDUCTION INTERNAL FIXATION ANKLE Left 1990          TUBAL LIGATION         Social History   Substance Use Topics     Smoking status: Former Smoker     Packs/day: 0.20     Years: 5.00     Types: Cigarettes     Quit date: 5/30/2012      Smokeless tobacco: Never Used     Alcohol use Yes      Comment: occasional      Family History   Problem Relation Age of Onset     Cancer - colorectal Mother      Unknown/Adopted Maternal Grandmother      Unknown/Adopted Maternal Grandfather      Unknown/Adopted Paternal Grandmother      Unknown/Adopted Paternal Grandfather      Depression Sister      Thyroid Disease Maternal Aunt      Cancer No family hx of      Diabetes No family hx of      Hypertension No family hx of      Cerebrovascular Disease No family hx of      Glaucoma No family hx of      Macular Degeneration No family hx of          Current Outpatient Prescriptions   Medication Sig Dispense Refill     levothyroxine (SYNTHROID/LEVOTHROID) 137 MCG tablet TAKE 1 TABLET (137 MCG) BY MOUTH DAILY 90 tablet 3     melatonin 3 MG tablet Take 1 tablet (3 mg) by mouth nightly as needed for sleep 30 tablet 0     norethindrone-ethinyl estradiol (ORTHO-NOVUM 1-35 TAB,NORTREL 1-35 TAB) 1-35 MG-MCG per tablet Take 1 tablet by mouth daily 84 tablet 4     ferrous sulfate (IRON) 325 (65 Fe) MG tablet Take by mouth daily (with breakfast)       Prenatal MV-Min-Fe Fum-FA-DHA (PRENATAL 1 PO) Take 1 tablet by mouth daily       No Known Allergies  Recent Labs   Lab Test  10/12/17   1543  08/15/16   1151   04/29/15   1115   08/28/14   1601   11/13/13   1655   06/11/12   1629  06/04/12   1222   03/01/12   1732   A1C   --    --    --    --    --   4.8   --    --    --    --    --    --    --    LDL   --    --    --   68   --    --    --    --    --    --    --    --   58   HDL   --    --    --   82   --    --    --    --    --    --    --    --   36*   TRIG   --    --    --   78   --    --    --    --    --    --    --    --   97   ALT   --    --    --    --    --    --    --   18   --   12  14   < >  27   CR   --    --    --    --    --    --    --   0.87   --    --   0.65   --   0.58   GFRESTIMATED   --    --    --    --    --    --    --   71   --    --   >90   --   >90  "  GFRESTBLACK   --    --    --    --    --    --    --   86   --    --   >90   --   >90   POTASSIUM   --    --    --    --    --    --    --   4.0   --    --   4.0   --   3.7   TSH  4.44*  0.72   < >   --    < >  0.24*   < >  17.00*   < >  0.26*  0.31*   < >  <0.02*    < > = values in this interval not displayed.      BP Readings from Last 3 Encounters:   08/15/18 108/80   06/26/18 124/78   06/12/18 (!) 138/92    Wt Readings from Last 3 Encounters:   08/15/18 207 lb (93.9 kg)   06/26/18 214 lb 6.4 oz (97.3 kg)   06/12/18 207 lb (93.9 kg)                  Labs reviewed in EPIC    Reviewed and updated as needed this visit by clinical staff  Tobacco  Allergies  Meds  Med Hx  Surg Hx  Fam Hx  Soc Hx      Reviewed and updated as needed this visit by Provider  Allergies  Meds         ROS:  CONSTITUTIONAL: NEGATIVE for fever, chills, change in weight  ENT/MOUTH: NEGATIVE for ear, mouth and throat problems  RESP: NEGATIVE for significant cough or SOB  CV: NEGATIVE for chest pain, palpitations or peripheral edema  GI: NEGATIVE for nausea, abdominal pain, heartburn, or change in bowel habits  : as above    OBJECTIVE:     /80  Pulse 108  Temp 96.8  F (36  C) (Oral)  Resp 11  Ht 5' 7.75\" (1.721 m)  Wt 207 lb (93.9 kg)  SpO2 99%  BMI 31.71 kg/m2  Body mass index is 31.71 kg/(m^2).  GENERAL: healthy, alert and no distress  RESP: lungs clear to auscultation - no rales, rhonchi or wheezes  CV: regular rate and rhythm, normal S1 S2, no S3 or S4, no murmur, click or rub, no peripheral edema and peripheral pulses strong  ABDOMEN: soft, nontender, no hepatosplenomegaly, no masses and bowel sounds normal  MS: no gross musculoskeletal defects noted, no edema    Diagnostic Test Results:  Results for orders placed or performed in visit on 08/15/18 (from the past 24 hour(s))   Wet prep   Result Value Ref Range    Specimen Description Vagina     Wet Prep No Trichomonas seen     Wet Prep Yeast seen (A)     Wet Prep No " clue cells seen    *UA reflex to Microscopic and Culture ([place] [facility] (except [place] and [place])   Result Value Ref Range    Color Urine Yellow     Appearance Urine Clear     Glucose Urine Negative NEG^Negative mg/dL    Bilirubin Urine Negative NEG^Negative    Ketones Urine Negative NEG^Negative mg/dL    Specific Gravity Urine 1.025 1.003 - 1.035    Blood Urine Negative NEG^Negative    pH Urine 6.0 5.0 - 7.0 pH    Protein Albumin Urine 30 (A) NEG^Negative mg/dL    Urobilinogen Urine 1.0 0.2 - 1.0 EU/dL    Nitrite Urine Negative NEG^Negative    Leukocyte Esterase Urine Small (A) NEG^Negative    Source Midstream Urine    Urine Microscopic   Result Value Ref Range    WBC Urine 0 - 5 OTO5^0 - 5 /HPF    RBC Urine O - 2 OTO2^O - 2 /HPF    Squamous Epithelial /LPF Urine Moderate (A) FEW^Few /LPF    Mucous Urine Present (A) NEG^Negative /LPF       ASSESSMENT/PLAN:             ICD-10-CM    1. Vaginal itching L29.8 Wet prep     *UA reflex to Microscopic and Culture ([place] [facility] (except [place] and [place])     Urine Microscopic   2. Yeast infection of the vagina B37.3 fluconazole (DIFLUCAN) 150 MG tablet   3. Primary insomnia F51.01 melatonin 3 MG tablet   SEE EPIC care orders  The potential side effects of this medication have been discussed with the patient.  Call if any significant problems with these are experienced.    Follow up 1 week if not better/sooner if worse    Follow up if not better    Nahomi Castañeda MD  [facility] [place]

## 2018-08-24 ENCOUNTER — TELEPHONE (OUTPATIENT)
Dept: FAMILY MEDICINE | Facility: CLINIC | Age: 48
End: 2018-08-24

## 2018-08-24 ENCOUNTER — OFFICE VISIT (OUTPATIENT)
Dept: OBGYN | Facility: CLINIC | Age: 48
End: 2018-08-24
Payer: COMMERCIAL

## 2018-08-24 VITALS
DIASTOLIC BLOOD PRESSURE: 82 MMHG | OXYGEN SATURATION: 98 % | SYSTOLIC BLOOD PRESSURE: 124 MMHG | BODY MASS INDEX: 31.49 KG/M2 | HEART RATE: 88 BPM | WEIGHT: 205.6 LBS

## 2018-08-24 DIAGNOSIS — F51.01 PRIMARY INSOMNIA: Primary | ICD-10-CM

## 2018-08-24 DIAGNOSIS — Z98.890 POSTOPERATIVE STATE: Primary | ICD-10-CM

## 2018-08-24 PROCEDURE — 99024 POSTOP FOLLOW-UP VISIT: CPT | Performed by: OBSTETRICS & GYNECOLOGY

## 2018-08-24 NOTE — MR AVS SNAPSHOT
After Visit Summary   8/24/2018    Liane Key    MRN: [id number]           Patient Information     Date Of Birth          1970        Visit Information        Provider Department      8/24/2018 9:00 AM Eliseo Fox MD [facility] [place]        Today's Diagnoses     Postoperative state    -  1       Follow-ups after your visit        Follow-up notes from your care team     Return in about 1 year (around 8/24/2019) for Physical Exam.      Who to contact     If you have questions or need follow up information about today's clinic visit or your schedule please contact [facility] directly at [phone].  Normal or non-critical lab and imaging results will be communicated to you by MyChart, letter or phone within 4 business days after the clinic has received the results. If you do not hear from us within 7 days, please contact the clinic through MyChart or phone. If you have a critical or abnormal lab result, we will notify you by phone as soon as possible.  Submit refill requests through [employer] or call your pharmacy and they will forward the refill request to us. Please allow 3 business days for your refill to be completed.          Additional Information About Your Visit        Care EveryWhere ID     This is your Care EveryWhere ID. This could be used by other organizations to access your [place] medical records  [id number]        Your Vitals Were     Pulse Pulse Oximetry BMI (Body Mass Index)             88 98% 31.49 kg/m2          Blood Pressure from Last 3 Encounters:   08/24/18 124/82   08/15/18 108/80   06/26/18 124/78    Weight from Last 3 Encounters:   08/24/18 205 lb 9.6 oz (93.3 kg)   08/15/18 207 lb (93.9 kg)   06/26/18 214 lb 6.4 oz (97.3 kg)              Today, you had the following     No orders found for display       Primary Care Provider Office Phone # Fax #    Nahomi Castañeda [phone] [address] [place]  [address]        Equal Access to Services     [facility] : Hadii aad ku hadramyagutierrez Ezali, waluis enriqueda jirobynha, qacheryleta claudymaria esthermoses mckeon. So [facility] [phone].    ATENCIÓN: Si habla español, tiene a melgar disposición servicios gratuitos de asistencia lingüística. Haydename al [phone].    We comply with applicable federal civil rights laws and [place] laws. We do not discriminate on the basis of race, color, national origin, age, disability, sex, sexual orientation, or gender identity.            Thank you!     Thank you for choosing [facility] [place]  for your care. Our goal is always to provide you with excellent care. Hearing back from our patients is one way we can continue to improve our services. Please take a few minutes to complete the written survey that you may receive in the mail after your visit with us. Thank you!             Your Updated Medication List - Protect others around you: Learn how to safely use, store and throw away your medicines at www.disposemymeds.org.          This list is accurate as of 8/24/18  9:46 AM.  Always use your most recent med list.                   Brand Name Dispense Instructions for use Diagnosis    ferrous sulfate 325 (65 Fe) MG tablet    IRON     Take by mouth daily (with breakfast)        levothyroxine 137 MCG tablet    SYNTHROID/LEVOTHROID    90 tablet    TAKE 1 TABLET (137 MCG) BY MOUTH DAILY    Hypothyroidism following radioiodine therapy       melatonin 3 MG tablet     30 tablet    Take 1 tablet (3 mg) by mouth nightly as needed for sleep    Primary insomnia       norethindrone-ethinyl estradiol 1-35 MG-MCG per tablet    ORTHO-NOVUM 1-35 TAB,NORTREL 1-35 TAB    84 tablet    Take 1 tablet by mouth daily    Excessive or frequent menstruation       PRENATAL 1 PO      Take 1 tablet by mouth daily

## 2018-08-24 NOTE — TELEPHONE ENCOUNTER
Reason for Call:  Other prescription    Detailed comments: Patient was seen on 0815/2018 and given sleeping pills and they are not working. Can some one please give her a call back. Thanks    Phone Number Patient can be reached at: Cell number on file:    Telephone Information:   Mobile [phone]       Best Time: any    Can we leave a detailed message on this number? YES    Call taken on 8/24/2018 at 9:52 AM by Betty Hooker

## 2018-08-24 NOTE — LETTER
August 24, 2018      RE: Laine Key  [address]  [place] [address]       To whom it may concern:    Liane Key was seen in our clinic today. She  may return to work with no restrictions, as of August 27, 2018.     Sincerely,          Eliseo Fox MD

## 2018-08-24 NOTE — Clinical Note
I think I submitted this for charges last month, but I would appreciate if you check for me  Thanks Eliseo

## 2018-08-24 NOTE — PROGRESS NOTES
Liane is a 48 year old , She is 6 weeks s/p LAVH.  Her postop recovery has been uncomplicated.  She is currently requiring no medications for pain management.  She has had some pinkish discharge, which is still normal for this stage of the post op course. no hot flashes. Energy level is returning.  Denies fever, chills.      The pathology report showed:   Uterus, cervix, bilateral fallopian tubes: Is a 255 g fragmented uterus measuring 12.0 x 10.5 x 6.0 cm with detached bilateral fimbriated fallopian tubes measuring 4 cm (#1) and 3 cm (#2). The uterine serosa is pink-tan smooth and glistening. The cervix is grossly unremarkable. The endometrial cavity is tan-red and grossly unremarkable. Multiple leiomyomas are identified ranging from 0.5-0.6 cm. Representative sections are submitted as follows:  A1-cervix  A2-endometrial cavity  A3-full-thickness section of endomyometrium  A4-sections leiomyoma  A5-fallopian tube #1  A6-fallopian tube #2 (CM)  Microscopic Description The cervix is unremarkable. The endometrium is lined by benign proliferating endometrium. There is adenomyosis. The leiomyomas are composed of a bland proliferation of smooth muscle. There is no atypia or mitotic activity. The fallopian tubes are unremarkable.   [facility] LABORATORY       The medical history, social history and family history have been reviewed and updated as indicated.      ROS:   ROS:4 point ROS including Respiratory, CV, GI and , other than that noted in the HPI and the PMH, is negative     PE: /82 (BP Location: Right arm, Cuff Size: Adult Regular)  Pulse 88  Wt 205 lb 9.6 oz (93.3 kg)  SpO2 98%  BMI 31.49 kg/m2  General:  WNWD female, NAD  Alert  Oriented x 3  Gait:  Normal  Skin:  Normal skin turgor  HEENT:  NC/AT, EOMI  Abdomen:  Non-tender, non-distended.  Vulva: No external lesions, normal hair distribution, no adenopathy  BUS:  Normal, no masses noted  Urethra:  No hypermobility seen  Urethral  meatus:  No masses noted  Vagina: Moist, pink, some light pink discharge, but no evidence of granulation tissue is seen, well rugated.  Cervix: absent   Perianal:  No masses noted  Extremities:  No clubbing, no cyanosis and no edema.      ASSESSMENT:  Post op [facility]    PLAN:  Return to routine care.     Eliseo Fox MD

## 2018-08-27 RX ORDER — TRAZODONE HYDROCHLORIDE 50 MG/1
50 TABLET, FILM COATED ORAL
Qty: 30 TABLET | Refills: 1 | Status: SHIPPED | OUTPATIENT
Start: 2018-08-27 | End: 2019-02-19

## 2018-08-27 NOTE — TELEPHONE ENCOUNTER
Spoke with patient she was taking Melatonin 3mg for sleep but that did not help.  She took 6mg to see if that would be more effective and she was still very restless when she went to bed and was not able to fall asleep.  Patient would like another medication or wondering if the dose can be increase on the Melatonin.   Please advise   Mily Leon RN

## 2018-08-28 NOTE — TELEPHONE ENCOUNTER
Patient notified of providers message as written.  Patient verbalized understanding, no further questions or concerns.    Mily Leon RN

## 2018-11-27 DIAGNOSIS — E89.0 HYPOTHYROIDISM FOLLOWING RADIOIODINE THERAPY: ICD-10-CM

## 2018-11-27 RX ORDER — LEVOTHYROXINE SODIUM 137 UG/1
TABLET ORAL
Qty: 30 TABLET | Refills: 0 | Status: SHIPPED | OUTPATIENT
Start: 2018-11-27 | End: 2018-12-22

## 2018-11-28 NOTE — TELEPHONE ENCOUNTER
"Requested Prescriptions   Pending Prescriptions Disp Refills     levothyroxine (SYNTHROID/LEVOTHROID) 137 MCG tablet [Pharmacy Med Name: LEVOTHYROXINE 137 MCG TABLET] 90 tablet 3     Sig: TAKE 1 TABLET (137 MCG) BY MOUTH DAILY    Thyroid Protocol Failed    11/27/2018  3:19 PM       Failed - Normal TSH on file in past 12 months    Recent Labs   Lab Test  10/12/17   1543   TSH  4.44*             Passed - Patient is 12 years or older       Passed - Recent (12 mo) or future (30 days) visit within the authorizing provider's specialty    Patient had office visit in the last 12 months or has a visit in the next 30 days with authorizing provider or within the authorizing provider's specialty.  See \"Patient Info\" tab in inbasket, or \"Choose Columns\" in Meds & Orders section of the refill encounter.             Passed - No active pregnancy on record    If patient is pregnant or has had a positive pregnancy test, please check TSH.         Passed - No positive pregnancy test in past 12 months    If patient is pregnant or has had a positive pregnancy test, please check TSH.          Routing refill request to provider for review/approval because:  Labs out of range:  TSH  Labs not current:  TSH    Marsha Qureshi RN          "

## 2018-12-04 ENCOUNTER — TELEPHONE (OUTPATIENT)
Dept: OBGYN | Facility: CLINIC | Age: 48
End: 2018-12-04

## 2018-12-04 NOTE — TELEPHONE ENCOUNTER
Reason for Call:  Other disability form from 07/13/18    Detailed comments: Patient was off work for 6 weeks this summer and lost her disability form and would like a copy of it faxed to her at [phone]    Phone Number Patient can be reached at:   Best Time: [phone] (H)    Can we leave a detailed message on this number? YES    Call taken on 12/4/2018 at 12:14 PM by Stephany Corona

## 2018-12-26 ENCOUNTER — DOCUMENTATION ONLY (OUTPATIENT)
Dept: FAMILY MEDICINE | Facility: CLINIC | Age: 48
End: 2018-12-26

## 2018-12-26 DIAGNOSIS — D50.9 IRON DEFICIENCY ANEMIA, UNSPECIFIED IRON DEFICIENCY ANEMIA TYPE: ICD-10-CM

## 2018-12-26 DIAGNOSIS — E05.00 GRAVES DISEASE: ICD-10-CM

## 2018-12-26 DIAGNOSIS — E89.0 HYPOTHYROIDISM FOLLOWING RADIOIODINE THERAPY: ICD-10-CM

## 2018-12-26 DIAGNOSIS — E05.00 GRAVES DISEASE: Primary | ICD-10-CM

## 2018-12-26 PROBLEM — N93.9 ABNORMAL UTERINE BLEEDING: Status: RESOLVED | Noted: 2018-05-09 | Resolved: 2018-12-26

## 2018-12-26 LAB
HGB BLD-MCNC: 12.3 G/DL (ref 11.7–15.7)
T4 FREE SERPL-MCNC: 1.35 NG/DL (ref 0.76–1.46)
TSH SERPL DL<=0.005 MIU/L-ACNC: 0.38 MU/L (ref 0.4–4)

## 2018-12-26 PROCEDURE — 84439 ASSAY OF FREE THYROXINE: CPT | Performed by: FAMILY MEDICINE

## 2018-12-26 PROCEDURE — 36415 COLL VENOUS BLD VENIPUNCTURE: CPT | Performed by: FAMILY MEDICINE

## 2018-12-26 PROCEDURE — 85018 HEMOGLOBIN: CPT | Performed by: FAMILY MEDICINE

## 2018-12-26 PROCEDURE — 84443 ASSAY THYROID STIM HORMONE: CPT | Performed by: FAMILY MEDICINE

## 2018-12-26 RX ORDER — LEVOTHYROXINE SODIUM 137 UG/1
137 TABLET ORAL EVERY OTHER DAY
Qty: 45 TABLET | Refills: 0 | Status: SHIPPED | OUTPATIENT
Start: 2018-12-26 | End: 2019-01-08

## 2018-12-26 RX ORDER — LEVOTHYROXINE SODIUM 125 UG/1
125 TABLET ORAL EVERY OTHER DAY
Qty: 45 TABLET | Refills: 0 | Status: SHIPPED | OUTPATIENT
Start: 2018-12-26 | End: 2019-01-08

## 2018-12-26 NOTE — RESULT ENCOUNTER NOTE
Please call patient:    You anemia has resolved. Your thyroid dose needs to be reduced.  Let's try alternating your current dose with the lower dose every other day. New prescriptions have been sent to your pharmacy.  Return for recheck of these labs in 6 weeks.  Call [phone] or go to [email or web address] for a lab-only appointment.      Glenis Trinidad MD

## 2018-12-26 NOTE — PROGRESS NOTES
Please place orders for thyroid labs per patient. A rainbow was drawn on the patient in case other labs are needed. She was fasting.    Thank you,    Alexis Flores [facility]  [place] [facility]

## 2019-01-07 NOTE — PROGRESS NOTES
SUBJECTIVE:   Liane Key is a 48 year old female who presents to clinic today for the following health issues:      Medication Followup of All Medications     Taking Medication as prescribed: yes    Side Effects:  None    Medication Helping Symptoms:  yes   Pt not sure what dose of meds she is on-she feels she has both 125 and 137 mcg dose  Pts BP is elevated  Pt says she was upset with her son and recently had a talk with him  Patient presents for evaluation of hypertension.  She indicates that she is feeling well and denies any symptoms referable to her elevated blood pressure. Specifically denies chest pain, palpitations, dyspnea, orthopnea, PND or peripheral edema.    Family history: negative for hypertension, diabetes, or cardiovascular disease  Age at onset of elevated blood pressure:   Cardiovascular risk factors: obesity  Use of agents associated with hypertension: none  History of renal disease: negative            Problem list and histories reviewed & adjusted, as indicated.  Additional history: as documented    Patient Active Problem List   Diagnosis     CARDIOVASCULAR SCREENING; LDL GOAL LESS THAN 160     Graves disease     Hypothyroidism following radioiodine therapy     Obesity     Elevated blood pressure reading without diagnosis of hypertension     Past Surgical History:   Procedure Laterality Date     CHOLECYSTECTOMY            HYSTERECTOMY, PAP NO LONGER INDICATED  2018     LAPAROSCOPIC ASSISTED HYSTERECTOMY VAGINAL  2018    adenomyosis, fibroids      OPEN REDUCTION INTERNAL FIXATION ANKLE Left           TUBAL LIGATION         Social History     Tobacco Use     Smoking status: Former Smoker     Packs/day: 0.20     Years: 5.00     Pack years: 1.00     Types: Cigarettes     Last attempt to quit: 2012     Years since quittin.6     Smokeless tobacco: Never Used   Substance Use Topics     Alcohol use: Yes     Comment: occasional      Family History   Problem Relation  Age of Onset     Cancer - colorectal Mother      Unknown/Adopted Maternal Grandmother      Unknown/Adopted Maternal Grandfather      Unknown/Adopted Paternal Grandmother      Unknown/Adopted Paternal Grandfather      Depression Sister      Thyroid Disease Maternal Aunt      Cancer No family hx of      Diabetes No family hx of      Hypertension No family hx of      Cerebrovascular Disease No family hx of      Glaucoma No family hx of      Macular Degeneration No family hx of          Current Outpatient Medications   Medication Sig Dispense Refill     levothyroxine (SYNTHROID/LEVOTHROID) 125 MCG tablet Take 1 tablet (125 mcg) by mouth daily 30 tablet 1     ferrous sulfate (IRON) 325 (65 Fe) MG tablet Take by mouth daily (with breakfast)       melatonin 3 MG tablet Take 1 tablet (3 mg) by mouth nightly as needed for sleep (Patient not taking: Reported on 1/8/2019) 30 tablet 0     norethindrone-ethinyl estradiol (ORTHO-NOVUM 1-35 TAB,NORTREL 1-35 TAB) 1-35 MG-MCG per tablet Take 1 tablet by mouth daily (Patient not taking: Reported on 1/8/2019) 84 tablet 4     Prenatal MV-Min-Fe Fum-FA-DHA (PRENATAL 1 PO) Take 1 tablet by mouth daily       traZODone (DESYREL) 50 MG tablet Take 1 tablet (50 mg) by mouth nightly as needed for sleep (Patient not taking: Reported on 1/8/2019) 30 tablet 1     No Known Allergies  Recent Labs   Lab Test 12/26/18  0830 10/12/17  1543  04/29/15  1115  08/28/14  1601  11/13/13  1655  06/11/12  1629 06/04/12  1222  03/01/12  1732   A1C  --   --   --   --   --  4.8  --   --   --   --   --   --   --    LDL  --   --   --  68  --   --   --   --   --   --   --   --  58   HDL  --   --   --  82  --   --   --   --   --   --   --   --  36*   TRIG  --   --   --  78  --   --   --   --   --   --   --   --  97   ALT  --   --   --   --   --   --   --  18  --  12 14   < > 27   CR  --   --   --   --   --   --   --  0.87  --   --  0.65  --  0.58   GFRESTIMATED  --   --   --   --   --   --   --  71  --   --  >90   "--  >90   GFRESTBLACK  --   --   --   --   --   --   --  86  --   --  >90  --  >90   POTASSIUM  --   --   --   --   --   --   --  4.0  --   --  4.0  --  3.7   TSH 0.38* 4.44*   < >  --    < > 0.24*   < > 17.00*   < > 0.26* 0.31*   < > <0.02*    < > = values in this interval not displayed.      BP Readings from Last 3 Encounters:   01/08/19 (!) 152/100   08/24/18 124/82   08/15/18 108/80    Wt Readings from Last 3 Encounters:   01/08/19 99 kg (218 lb 3.2 oz)   08/24/18 93.3 kg (205 lb 9.6 oz)   08/15/18 93.9 kg (207 lb)                  Labs reviewed in EPIC    Reviewed and updated as needed this visit by clinical staff       Reviewed and updated as needed this visit by Provider         ROS:  CONSTITUTIONAL: NEGATIVE for fever, chills, change in weight  ENT/MOUTH: NEGATIVE for ear, mouth and throat problems  RESP: NEGATIVE for significant cough or SOB  CV: NEGATIVE for chest pain, palpitations or peripheral edema  GI: NEGATIVE for nausea, abdominal pain, heartburn, or change in bowel habits  MUSCULOSKELETAL: NEGATIVE for significant arthralgias or myalgia  ROS otherwise negative    OBJECTIVE:     BP (!) 152/100   Temp 97.1  F (36.2  C) (Oral)   Resp 16   Ht 1.69 m (5' 6.54\")   Wt 99 kg (218 lb 3.2 oz)   BMI 34.65 kg/m    Body mass index is 34.65 kg/m .  GENERAL: healthy, alert and no distress  NECK: no adenopathy, no asymmetry, masses, or scars and thyroid normal to palpation  RESP: lungs clear to auscultation - no rales, rhonchi or wheezes  CV: regular rate and rhythm, normal S1 S2, no S3 or S4, no murmur, click or rub, no peripheral edema and peripheral pulses strong  ABDOMEN: soft, nontender, no hepatosplenomegaly, no masses and bowel sounds normal  MS: no gross musculoskeletal defects noted, no edema    Diagnostic Test Results:  Pending     ASSESSMENT/PLAN:         BMI:   Estimated body mass index is 34.65 kg/m  as calculated from the following:    Height as of this encounter: 1.69 m (5' 6.54\").    Weight as " of this encounter: 99 kg (218 lb 3.2 oz).   Weight management plan: DASH diet/exercise      1. Hypothyroidism following radioiodine therapy  Advised that she only take 125 mcg daily as she is getting confused when she has to take 2 different pills  Follow up 6 weejs  - levothyroxine (SYNTHROID/LEVOTHROID) 125 MCG tablet; Take 1 tablet (125 mcg) by mouth daily  Dispense: 30 tablet; Refill: 1  Cancel colonoscopy-addendum  Follow up 1 week Blood Pressure check  See MD if over 140/90  Follow up TSH check 6 weeks  Nahomi Castañeda MD  [facility]

## 2019-01-08 ENCOUNTER — OFFICE VISIT (OUTPATIENT)
Dept: FAMILY MEDICINE | Facility: CLINIC | Age: 49
End: 2019-01-08
Payer: COMMERCIAL

## 2019-01-08 VITALS
DIASTOLIC BLOOD PRESSURE: 100 MMHG | WEIGHT: 218.2 LBS | BODY MASS INDEX: 34.25 KG/M2 | HEIGHT: 67 IN | TEMPERATURE: 97.1 F | SYSTOLIC BLOOD PRESSURE: 152 MMHG | RESPIRATION RATE: 16 BRPM

## 2019-01-08 DIAGNOSIS — E89.0 HYPOTHYROIDISM FOLLOWING RADIOIODINE THERAPY: ICD-10-CM

## 2019-01-08 DIAGNOSIS — R03.0 ELEVATED BLOOD PRESSURE READING WITHOUT DIAGNOSIS OF HYPERTENSION: ICD-10-CM

## 2019-01-08 PROBLEM — Z80.0 FAMILY HISTORY OF COLON CANCER: Status: ACTIVE | Noted: 2019-01-08

## 2019-01-08 PROCEDURE — 99213 OFFICE O/P EST LOW 20 MIN: CPT | Performed by: FAMILY MEDICINE

## 2019-01-08 RX ORDER — LEVOTHYROXINE SODIUM 125 UG/1
125 TABLET ORAL DAILY
Qty: 30 TABLET | Refills: 1 | Status: SHIPPED | OUTPATIENT
Start: 2019-01-08 | End: 2019-03-15

## 2019-01-08 ASSESSMENT — MIFFLIN-ST. JEOR: SCORE: 1644.99

## 2019-01-09 ENCOUNTER — TELEPHONE (OUTPATIENT)
Dept: FAMILY MEDICINE | Facility: CLINIC | Age: 49
End: 2019-01-09

## 2019-01-09 DIAGNOSIS — Z12.11 SPECIAL SCREENING FOR MALIGNANT NEOPLASMS, COLON: Primary | ICD-10-CM

## 2019-01-09 NOTE — TELEPHONE ENCOUNTER
Called and spoke with patient.   Gave information and patient states she will complete the FIT test.  No further questions.     Erinn Beard RN

## 2019-01-09 NOTE — TELEPHONE ENCOUNTER
Reason for Call:  Other call back    Detailed comments: Called patient to schedule a colonoscopy per referral. Patient stated this was not discussed at 1-8-19 appointment. Patient would like to discuss why this order was put thru and why procedure is necessary. Please call patient to discuss.    Phone Number Patient can be reached at: Home number on file [phone] (home)    Best Time: any    Can we leave a detailed message on this number? YES    Call taken on 1/9/2019 at 12:11 PM by Naila Steen

## 2019-01-09 NOTE — TELEPHONE ENCOUNTER
Called and spoke with patient.   Patient was wondering why a colonoscopy is recommended.  Advised patient we have notes of family history of colon cancer in her chart and a colonoscopy is recommended.   Patient states that no one in her family has a history of colon cancer and states she never told anyone that and doesn't know how that information got into her chart.  Patient requesting clarification and next steps.     Erinn Beard RN

## 2019-01-09 NOTE — TELEPHONE ENCOUNTER
Please call -we will correct this  Colon cancer screen is recommended at age 45 now  Also  She should consider doing FIT card  Please send FIT card if she agrees to do this

## 2019-01-14 ENCOUNTER — ALLIED HEALTH/NURSE VISIT (OUTPATIENT)
Dept: NURSING | Facility: CLINIC | Age: 49
End: 2019-01-14
Payer: COMMERCIAL

## 2019-01-14 VITALS — DIASTOLIC BLOOD PRESSURE: 80 MMHG | HEART RATE: 74 BPM | OXYGEN SATURATION: 100 % | SYSTOLIC BLOOD PRESSURE: 128 MMHG

## 2019-01-14 DIAGNOSIS — Z01.30 BP CHECK: Primary | ICD-10-CM

## 2019-01-14 NOTE — NURSING NOTE
Liane Key is a 48 year old patient who comes in today for a Blood Pressure check.  Initial BP:  /80   Pulse 74   SpO2 100%      74  Disposition: results routed to provider  Isabelle PEREZ CMA ([facility])

## 2019-02-19 ENCOUNTER — OFFICE VISIT (OUTPATIENT)
Dept: FAMILY MEDICINE | Facility: CLINIC | Age: 49
End: 2019-02-19
Payer: COMMERCIAL

## 2019-02-19 VITALS
SYSTOLIC BLOOD PRESSURE: 128 MMHG | WEIGHT: 221.6 LBS | HEART RATE: 84 BPM | OXYGEN SATURATION: 100 % | DIASTOLIC BLOOD PRESSURE: 84 MMHG | BODY MASS INDEX: 34.78 KG/M2 | HEIGHT: 67 IN | RESPIRATION RATE: 16 BRPM | TEMPERATURE: 97 F

## 2019-02-19 DIAGNOSIS — E89.0 HYPOTHYROIDISM FOLLOWING RADIOIODINE THERAPY: ICD-10-CM

## 2019-02-19 DIAGNOSIS — Z12.11 SPECIAL SCREENING FOR MALIGNANT NEOPLASMS, COLON: ICD-10-CM

## 2019-02-19 DIAGNOSIS — Z12.11 SCREEN FOR COLON CANCER: ICD-10-CM

## 2019-02-19 DIAGNOSIS — Z23 NEED FOR PROPHYLACTIC VACCINATION AND INOCULATION AGAINST INFLUENZA: ICD-10-CM

## 2019-02-19 DIAGNOSIS — Z86.39 HISTORY OF GRAVES' DISEASE: ICD-10-CM

## 2019-02-19 DIAGNOSIS — Z12.31 VISIT FOR SCREENING MAMMOGRAM: ICD-10-CM

## 2019-02-19 DIAGNOSIS — Z00.00 ROUTINE HISTORY AND PHYSICAL EXAMINATION OF ADULT: Primary | ICD-10-CM

## 2019-02-19 PROCEDURE — 99396 PREV VISIT EST AGE 40-64: CPT | Performed by: FAMILY MEDICINE

## 2019-02-19 PROCEDURE — 82947 ASSAY GLUCOSE BLOOD QUANT: CPT | Performed by: FAMILY MEDICINE

## 2019-02-19 PROCEDURE — 36415 COLL VENOUS BLD VENIPUNCTURE: CPT | Performed by: FAMILY MEDICINE

## 2019-02-19 PROCEDURE — 80061 LIPID PANEL: CPT | Performed by: FAMILY MEDICINE

## 2019-02-19 PROCEDURE — 84443 ASSAY THYROID STIM HORMONE: CPT | Performed by: FAMILY MEDICINE

## 2019-02-19 ASSESSMENT — MIFFLIN-ST. JEOR: SCORE: 1660.41

## 2019-02-19 NOTE — LETTER
[facility]  [address] [place] [address]    February 21, 2019    Liane Key  [address] [place] [address]      Dear Liane,    Cholesterol is Good   Your glucose is borderline high. Please watch your diet-low calorie/low carb foods.   Normal Thyroid test    Enclosed is a copy of your results.     Results for orders placed or performed in visit on 02/19/19   GLUCOSE   Result Value Ref Range    Glucose 108 (H) 70 - 99 mg/dL   Lipid panel reflex to direct LDL Fasting   Result Value Ref Range    Cholesterol 143 <200 mg/dL    Triglycerides 118 <150 mg/dL    HDL Cholesterol 60 >49 mg/dL    LDL Cholesterol Calculated 59 <100 mg/dL    Non HDL Cholesterol 83 <130 mg/dL   TSH with free T4 reflex   Result Value Ref Range    TSH 1.31 0.40 - 4.00 mU/L       If you have any questions or concerns, please call myself or my nurse at [phone].      Sincerely,        Nahomi Castañeda MD/abi

## 2019-02-19 NOTE — PATIENT INSTRUCTIONS
Preventive Health Recommendations  Female Ages 40 to 49    Yearly exam:     See your health care provider every year in order to  1. Review health changes.   2. Discuss preventive care.    3. Review your medicines if your doctor prescribed any.      Get a Pap test every three years (unless you have an abnormal result and your provider advises testing more often).      If you get Pap tests with HPV test, you only need to test every 5 years, unless you have an abnormal result. You do not need a Pap test if your uterus was removed (hysterectomy) and you have not had cancer.      You should be tested each year for STDs (sexually transmitted diseases), if you're at risk.     Ask your doctor if you should have a mammogram.      Have a colonoscopy (test for colon cancer) if someone in your family has had colon cancer or polyps before age 50.       Have a cholesterol test every 5 years.       Have a diabetes test (fasting glucose) after age 45. If you are at risk for diabetes, you should have this test every 3 years.    Shots: Get a flu shot each year. Get a tetanus shot every 10 years.     Nutrition:     Eat at least 5 servings of fruits and vegetables each day.    Eat whole-grain bread, whole-wheat pasta and brown rice instead of white grains and rice.    Get adequate Calcium and Vitamin D.      Lifestyle    Exercise at least 150 minutes a week (an average of 30 minutes a day, 5 days a week). This will help you control your weight and prevent disease.    Limit alcohol to one drink per day.    No smoking.     Wear sunscreen to prevent skin cancer.    See your dentist every six months for an exam and cleaning.  Patient Education     Treating Insomnia     Learning to relax before bedtime can improve your sleep.   Good sleeping habits are a key part of treatment. If needed, some medicines may help you sleep better at first. Making healthy lifestyle changes and learning to relax can improve your sleep. Treating insomnia  takes commitment. But trust that your efforts will pay off. Be sure to talk with your healthcare provider before taking any medicine.  Healthy lifestyle  Your lifestyle affects your health and your sleep. Here are some healthy habits:  Keep a regular sleep schedule. Go to bed and get up at the same time each day.  Exercise regularly. It may help you reduce stress. Avoid strenuous exercise for 2 to 4 hours before bedtime.  Avoid or limit naps, especially in the late afternoon.  Use your bed only for sleep and sex.  Don t spend too much time in bed trying to fall asleep. If you can t fall asleep, get up and do something (no electronics) until you become tired and drowsy.  Avoid or limit caffeine and nicotine for up to 6 hours before bedtime. They can keep you awake at night.   Also avoid alcohol for at least 4 to 6 hours before bedtime. It may help you fall asleep at first. But you will have more awakenings during the night. And your sleep will not be restful.  Before bedtime  To sleep better every night, try these tips:  Have a bedtime routine to let your body and mind know when it s time to sleep.  Think of going to bed as relaxing and enjoyable. Sleep will come sooner.  If your worries don t let you sleep, write them down in a diary. Then close it, and go to bed.  Make sure the room is not too hot or too cold. If it s not dark enough, an eye mask can help. If it s noisy, try using earplugs.  Don't eat a large meal just before bedtime.  Remove noises, bright lights, TVs, cell phones, and computers from your sleeping environment.  Use a comfortable mattress and pillow.  Learn to relax  Stress, anxiety, and body tension may keep you awake at night. To unwind before bedtime, try a warm bath, meditation, or yoga. Also try the following:  Deep breathing. Sit or lie back in a chair. Take a slow, deep breath. Hold it for 5 counts. Then breathe out slowly through your mouth. Keep doing this until you feel  relaxed.  Progressive muscle relaxation. Tense and then relax the muscles in your body as you breathe deeply. Start with your feet and work up your body to your neck and face.  Cognitive Behavioral Therapy (CBT)  CBT is the most effective treatment for long-term insomnia. It tries to address the underlying causes of your sleep problems, including your habits and how you think about sleep.  Individual Therapy  Franc Lucas PsyD, JACQUELIN  Insomnia   [facility]  [facility]: [phone]  [facility]: [phone]  [facility]  Visit [email or web address] or call [phone] to find a clinic close to you.  Suggested resources  The resources below may help you relax. This list is for information only. [facility] is not responsible for the quality of services or the actions of any person or organization. There may be a fee to use some of these resources.  Insomnia treatment books  Merritt Mind by Rosalva Coates and Pamela Aceves (2013)  Overcoming Insomnia by Bill Castro and Rosalva Coates (2008)  Quiet Your Mind and Get to Sleep: Solutions to Insomnia for Those with Depression, Anxiety or Chronic Pain by Rosalva Coates and Pamela Aceves (2009)  No More Sleepless Nights by Joe Marinelli and Anne Negrete (1996)  Say Merritt to Insomnia by Jose Andersen (2009)  The Insomnia Workbook by Ana Flanagan and Reji Zamorano (2009)  The Insomnia Answer by Derek Cortes and Leandro Mcduffie (2006)  Stress management and relaxation books  The Relaxation and Stress Reduction Workbook by Dasia Rodriguez and Daniel Chan (2008)  Stress Management Workbook: Techniques and Self-Assessment Procedures by Mamta Vega and Juan Daniel Reinoso (1997)  A Mindfulness-Based Stress Reduction Workbook by Thomas Painter and Eloisa Rahman (2010)  The Complete Stress Management Workbook by Brandin Cisneros  Avelino and Fito Henning (1996)  Online programs  www.SHUTi.me (pronounced shut eye). There is a fee for this program.   www.sleepIO.com (pronounced sleep ee oh). There is a fee for this program.  Other online resources  Deep breathing and progressive muscle relaxation (PMR):  http://www.[employer].Volo Broadband  Meditation:  www.[employer]he[employer]ilent.Volo Broadband  www.[employer] Energy SystemsguidedPi-CardiameditationPi-Cardiasite.com  Guided imagery:  http://www.Tripbod  http://Mocoplex.Volo Broadband  (click on  Resource Library,  then choose  Meditation, Relaxation, Guided Imagery )  Apps for your mobile device:  Autogenic Training Progressive Muscle Relaxation by [employer]  Calm: Meditation and Sleep Stories by Calm.com, Inc.  Sprinkle Timer - Meditation Sami by [employer].  This is not a prescription and these resources are optional. You must pay for any costs when using these resources. Please ask your insurance carrier if you can be reimbursed for these resources. If so, you are responsible for sending the needed details to your insurance carrier. These resources may also be tax deductible as medical expenses. Check with your .  Date Last Reviewed: 5/18/2018  Clinically reviewed by Franc Lucas PsyD, JACQUELIN, [facility], Director of the Insomnia and Behavioral Sleep Health Program, [facility].    4452-4532 [employer]. [address] [place] [address]60. All rights reserved. This information is not intended as a substitute for professional medical care. Always follow your healthcare professional's instructions.

## 2019-02-19 NOTE — PROGRESS NOTES
SUBJECTIVE:   CC: Liane Key is an 48 year old woman who presents for preventive health visit.     Healthy Habits:    Do you get at least three servings of calcium containing foods daily (dairy, green leafy vegetables, etc.)? yes    Amount of exercise or daily activities, outside of work: None    Problems taking medications regularly No    Medication side effects: No    Have you had an eye exam in the past two years? no    Do you see a dentist twice per year? yes    Do you have sleep apnea, excessive snoring or daytime drowsiness?yes      Hypothyroidism Follow-up      Since last visit, patient describes the following symptoms: Weight stable, no hair loss, no skin changes, no constipation, no loose stools      Today's PHQ-2 Score:   PHQ-2 (  [employer]) 2019   Q1: Little interest or pleasure in doing things 1 0   Q2: Feeling down, depressed or hopeless 0 0   PHQ-2 Score 1 0       Abuse: Current or Past(Physical, Sexual or Emotional)- No  Do you feel safe in your environment? Yes    Social History     Tobacco Use     Smoking status: Former Smoker     Packs/day: 0.20     Years: 5.00     Pack years: 1.00     Types: Cigarettes     Last attempt to quit: 2012     Years since quittin.7     Smokeless tobacco: Never Used   Substance Use Topics     Alcohol use: Yes     Comment: occasional      If you drink alcohol do you typically have >3 drinks per day or >7 drinks per week? No                     Reviewed orders with patient.  Reviewed health maintenance and updated orders accordingly - Yes  Labs reviewed in EPIC  BP Readings from Last 3 Encounters:   19 128/84   19 128/80   19 (!) 152/100    Wt Readings from Last 3 Encounters:   19 100.5 kg (221 lb 9.6 oz)   19 99 kg (218 lb 3.2 oz)   18 93.3 kg (205 lb 9.6 oz)                  Patient Active Problem List   Diagnosis     CARDIOVASCULAR SCREENING; LDL GOAL LESS THAN 160     Graves disease     Hypothyroidism  following radioiodine therapy     Obesity     Elevated blood pressure reading without diagnosis of hypertension     Family history of colon cancer     Past Surgical History:   Procedure Laterality Date     CHOLECYSTECTOMY  1998          HYSTERECTOMY, PAP NO LONGER INDICATED  2018     LAPAROSCOPIC ASSISTED HYSTERECTOMY VAGINAL  2018    adenomyosis, fibroids      OPEN REDUCTION INTERNAL FIXATION ANKLE Left           TUBAL LIGATION         Social History     Tobacco Use     Smoking status: Former Smoker     Packs/day: 0.20     Years: 5.00     Pack years: 1.00     Types: Cigarettes     Last attempt to quit: 2012     Years since quittin.7     Smokeless tobacco: Never Used   Substance Use Topics     Alcohol use: Yes     Comment: occasional      Family History   Problem Relation Age of Onset     Unknown/Adopted Maternal Grandmother      Unknown/Adopted Maternal Grandfather      Unknown/Adopted Paternal Grandmother      Unknown/Adopted Paternal Grandfather      Depression Sister      Thyroid Disease Maternal Aunt      Cancer No family hx of      Diabetes No family hx of      Hypertension No family hx of      Cerebrovascular Disease No family hx of      Glaucoma No family hx of      Macular Degeneration No family hx of          Current Outpatient Medications   Medication Sig Dispense Refill     levothyroxine (SYNTHROID/LEVOTHROID) 125 MCG tablet Take 1 tablet (125 mcg) by mouth daily 30 tablet 1     Prenatal MV-Min-Fe Fum-FA-DHA (PRENATAL 1 PO) Take 1 tablet by mouth daily       ferrous sulfate (IRON) 325 (65 Fe) MG tablet Take by mouth daily (with breakfast)       norethindrone-ethinyl estradiol (ORTHO-NOVUM 1-35 TAB,NORTREL 1-35 TAB) 1-35 MG-MCG per tablet Take 1 tablet by mouth daily (Patient not taking: Reported on 2019) 84 tablet 4     No Known Allergies  Recent Labs   Lab Test 18  0830 10/12/17  1543  04/29/15  1115  14  1601  13  1655  12  1629 12  1222   03/01/12  1732   A1C  --   --   --   --   --  4.8  --   --   --   --   --   --   --    LDL  --   --   --  68  --   --   --   --   --   --   --   --  58   HDL  --   --   --  82  --   --   --   --   --   --   --   --  36*   TRIG  --   --   --  78  --   --   --   --   --   --   --   --  97   ALT  --   --   --   --   --   --   --  18  --  12 14   < > 27   CR  --   --   --   --   --   --   --  0.87  --   --  0.65  --  0.58   GFRESTIMATED  --   --   --   --   --   --   --  71  --   --  >90  --  >90   GFRESTBLACK  --   --   --   --   --   --   --  86  --   --  >90  --  >90   POTASSIUM  --   --   --   --   --   --   --  4.0  --   --  4.0  --  3.7   TSH 0.38* 4.44*   < >  --    < > 0.24*   < > 17.00*   < > 0.26* 0.31*   < > <0.02*    < > = values in this interval not displayed.        Mammogram Screening: Patient under age 50, mutual decision reflected in health maintenance.      Pertinent mammograms are reviewed under the imaging tab.  History of abnormal Pap smear: pt has  ahd a Hysterectomy  PAP / HPV Latest Ref Rng & Units 4/29/2015 3/1/2012   PAP - NIL NIL   HPV 16 DNA NEG Negative -   HPV 18 DNA NEG Negative -   OTHER HR HPV NEG Negative -     Reviewed and updated as needed this visit by clinical staff  Tobacco  Allergies  Meds  Problems  Med Hx  Surg Hx  Fam Hx  Soc Hx          Reviewed and updated as needed this visit by Provider  Problems        Past Medical History:   Diagnosis Date     Graves disease      Hypothyroidism following radioiodine therapy      Obesity      Syphilis 5/8/2015      Past Surgical History:   Procedure Laterality Date     CHOLECYSTECTOMY  1998          HYSTERECTOMY, PAP NO LONGER INDICATED  07/13/2018     LAPAROSCOPIC ASSISTED HYSTERECTOMY VAGINAL  07/13/2018    adenomyosis, fibroids      OPEN REDUCTION INTERNAL FIXATION ANKLE Left 1990          TUBAL LIGATION         ROS:  CONSTITUTIONAL: NEGATIVE for fever, chills, change in weight  INTEGUMENTARY/SKIN: NEGATIVE for worrisome rashes,  "moles or lesions  EYES: NEGATIVE for vision changes or irritation  ENT: NEGATIVE for ear, mouth and throat problems  RESP: NEGATIVE for significant cough or SOB  BREAST: NEGATIVE for masses, tenderness or discharge  CV: NEGATIVE for chest pain, palpitations or peripheral edema  GI: NEGATIVE for nausea, abdominal pain, heartburn, or change in bowel habits  : NEGATIVE for unusual urinary or vaginal symptoms. No vaginal bleeding.  MUSCULOSKELETAL: NEGATIVE for significant arthralgias or myalgia  NEURO: NEGATIVE for weakness, dizziness or paresthesias  PSYCHIATRIC: NEGATIVE for changes in mood or affect   Pt has  Hot flashes due to menopause    OBJECTIVE:   /84   Pulse 84   Temp 97  F (36.1  C) (Oral)   Resp 16   Ht 1.69 m (5' 6.54\")   Wt 100.5 kg (221 lb 9.6 oz)   SpO2 100%   BMI 35.19 kg/m    EXAM:  GENERAL APPEARANCE: healthy, alert and no distress  EYES: Eyes grossly normal to inspection, PERRL and conjunctivae and sclerae normal  HENT: ear canals and TM's normal, nose and mouth without ulcers or lesions, oropharynx clear and oral mucous membranes moist  NECK: no adenopathy, no asymmetry, masses, or scars and thyroid normal to palpation  RESP: lungs clear to auscultation - no rales, rhonchi or wheezes  BREAST: normal without masses, tenderness or nipple discharge and no palpable axillary masses or adenopathy  CV: regular rate and rhythm, normal S1 S2, no S3 or S4, no murmur, click or rub, no peripheral edema and peripheral pulses strong  ABDOMEN: soft, nontender, no hepatosplenomegaly, no masses and bowel sounds normal  MS: no musculoskeletal defects are noted and gait is age appropriate without ataxia  SKIN: no suspicious lesions or rashes  NEURO: Normal strength and tone, sensory exam grossly normal, mentation intact and speech normal  PSYCH: mentation appears normal and affect normal/bright    Diagnostic Test Results:  Pending     ASSESSMENT/PLAN:   1. Routine history and physical examination of " "adult  Stable   - GLUCOSE  - Lipid panel reflex to direct LDL Fasting    2. Hypothyroidism following radioiodine therapy  Labs pending   - TSH with free T4 reflex    3. Screen for colon cancer  Pt says now she has No Family History of colon ca  Advised to do FIT  S    4. Need for prophylactic vaccination and inoculation against influenza  Advised     5. Visit for screening mammogram  Advised   - MA Screening Digital Bilateral; Future    6. Special screening for malignant neoplasms, colon    - Fecal colorectal cancer screen (FIT); Future    7. History of Graves' disease        COUNSELING:   Reviewed preventive health counseling, as reflected in patient instructions       Regular exercise       Healthy diet/nutrition       Vision screening       Osteoporosis Prevention/Bone Health       Colon cancer screening           BP Readings from Last 1 Encounters:   02/19/19 128/84     Estimated body mass index is 35.19 kg/m  as calculated from the following:    Height as of this encounter: 1.69 m (5' 6.54\").    Weight as of this encounter: 100.5 kg (221 lb 9.6 oz).      Weight management plan: low indira diet/Exercise     reports that she quit smoking about 6 years ago. Her smoking use included cigarettes. She has a 1.00 pack-year smoking history. she has never used smokeless tobacco.      Counseling Resources:  ATP IV Guidelines  Pooled Cohorts Equation Calculator  Breast Cancer Risk Calculator  FRAX Risk Assessment  ICSI Preventive Guidelines  Dietary Guidelines for Americans, 2010  USDA's MyPlate  ASA Prophylaxis  Lung CA Screening    Nahomi Castañeda MD  [facility]  "

## 2019-02-20 LAB
CHOLEST SERPL-MCNC: 143 MG/DL
GLUCOSE SERPL-MCNC: 108 MG/DL (ref 70–99)
HDLC SERPL-MCNC: 60 MG/DL
LDLC SERPL CALC-MCNC: 59 MG/DL
NONHDLC SERPL-MCNC: 83 MG/DL
TRIGL SERPL-MCNC: 118 MG/DL
TSH SERPL DL<=0.005 MIU/L-ACNC: 1.31 MU/L (ref 0.4–4)

## 2019-02-25 ENCOUNTER — ANCILLARY PROCEDURE (OUTPATIENT)
Dept: MAMMOGRAPHY | Facility: CLINIC | Age: 49
End: 2019-02-25
Attending: FAMILY MEDICINE
Payer: COMMERCIAL

## 2019-02-25 DIAGNOSIS — Z12.31 VISIT FOR SCREENING MAMMOGRAM: ICD-10-CM

## 2019-02-25 PROCEDURE — 77067 SCR MAMMO BI INCL CAD: CPT | Mod: TC

## 2019-03-15 DIAGNOSIS — E89.0 HYPOTHYROIDISM FOLLOWING RADIOIODINE THERAPY: ICD-10-CM

## 2019-03-18 RX ORDER — LEVOTHYROXINE SODIUM 125 UG/1
125 TABLET ORAL DAILY
Qty: 30 TABLET | Refills: 7 | Status: SHIPPED | OUTPATIENT
Start: 2019-03-18 | End: 2020-01-21

## 2019-05-29 ENCOUNTER — OFFICE VISIT (OUTPATIENT)
Dept: FAMILY MEDICINE | Facility: CLINIC | Age: 49
End: 2019-05-29
Payer: COMMERCIAL

## 2019-05-29 VITALS
TEMPERATURE: 98 F | OXYGEN SATURATION: 99 % | BODY MASS INDEX: 32.82 KG/M2 | HEART RATE: 91 BPM | SYSTOLIC BLOOD PRESSURE: 110 MMHG | RESPIRATION RATE: 18 BRPM | WEIGHT: 204.2 LBS | DIASTOLIC BLOOD PRESSURE: 70 MMHG | HEIGHT: 66 IN

## 2019-05-29 DIAGNOSIS — G47.00 INSOMNIA, UNSPECIFIED TYPE: ICD-10-CM

## 2019-05-29 DIAGNOSIS — Z72.51 UNPROTECTED SEX: Primary | ICD-10-CM

## 2019-05-29 PROCEDURE — 99213 OFFICE O/P EST LOW 20 MIN: CPT | Performed by: PHYSICIAN ASSISTANT

## 2019-05-29 ASSESSMENT — MIFFLIN-ST. JEOR: SCORE: 1560.62

## 2019-05-29 ASSESSMENT — PAIN SCALES - GENERAL: PAINLEVEL: NO PAIN (0)

## 2019-05-29 NOTE — PATIENT INSTRUCTIONS
Patient Education     Treating Insomnia     Learning to relax before bedtime can improve your sleep.   Good sleeping habits are a key part of treatment. If needed, some medicines may help you sleep better at first. Making healthy lifestyle changes and learning to relax can improve your sleep. Treating insomnia takes commitment. But trust that your efforts will pay off. Be sure to talk with your healthcare provider before taking any medicine.  Healthy lifestyle  Your lifestyle affects your health and your sleep. Here are some healthy habits:    Keep a regular sleep schedule. Go to bed and get up at the same time each day.    Exercise regularly. It may help you reduce stress. Avoid strenuous exercise for 2 to 4 hours before bedtime.    Avoid or limit naps, especially in the late afternoon.    Use your bed only for sleep and sex.    Don t spend too much time in bed trying to fall asleep. If you can t fall asleep, get up and do something (no electronics) until you become tired and drowsy.    Avoid or limit caffeine and nicotine for up to 6 hours before bedtime. They can keep you awake at night.     Also avoid alcohol for at least 4 to 6 hours before bedtime. It may help you fall asleep at first. But you will have more awakenings during the night. And your sleep will not be restful.  Before bedtime  To sleep better every night, try these tips:    Have a bedtime routine to let your body and mind know when it s time to sleep.    Think of going to bed as relaxing and enjoyable. Sleep will come sooner.    If your worries don t let you sleep, write them down in a diary. Then close it, and go to bed.    Make sure the room is not too hot or too cold. If it s not dark enough, an eye mask can help. If it s noisy, try using earplugs.    Don't eat a large meal just before bedtime.    Remove noises, bright lights, TVs, cell phones, and computers from your sleeping environment.    Use a comfortable mattress and pillow.  Learn to  relax  Stress, anxiety, and body tension may keep you awake at night. To unwind before bedtime, try a warm bath, meditation, or yoga. Also try the following:    Deep breathing. Sit or lie back in a chair. Take a slow, deep breath. Hold it for 5 counts. Then breathe out slowly through your mouth. Keep doing this until you feel relaxed.    Progressive muscle relaxation. Tense and then relax the muscles in your body as you breathe deeply. Start with your feet and work up your body to your neck and face.  Cognitive Behavioral Therapy (CBT)  CBT is the most effective treatment for long-term insomnia. It tries to address the underlying causes of your sleep problems, including your habits and how you think about sleep.  Individual Therapy  Franc Lucas PsyD, JACQUELIN  Insomnia   [facility]: [phone]    [facility]: [phone]  [facility]  Visit [email or web address].org or call [phone] to find a clinic close to you.  Suggested resources  The resources below may help you relax. This list is for information only. [facility] is not responsible for the quality of services or the actions of any person or organization. There may be a fee to use some of these resources.  Insomnia treatment books  Merritt Mind by Rosalva Coates and Pamela Aceves (2013)  Overcoming Insomnia by Bill Castro and Rosalva Coates (2008)  Quiet Your Mind and Get to Sleep: Solutions to Insomnia for Those with Depression, Anxiety or Chronic Pain by Rosalva Coates and Pamela Aceves (2009)  No More Sleepless Nights by Joe Marinelli and Anne Negrete (1996)  Say Merritt to Insomnia by Jose Andersen (2009)  The Insomnia Workbook by Ana Flanagan and Reji Zamorano (2009)  The Insomnia Answer by Derek Cortes and Leandro Mcduffie (2006)  Stress management and relaxation books  The Relaxation and Stress Reduction Workbook by Elisha Aburto,  Dasia Clinton and Daniel Chan (2008)  Stress Management Workbook: Techniques and Self-Assessment Procedures by Mamta Vega and Juan Daniel Reinoso (1997)  A Mindfulness-Based Stress Reduction Workbook by Thomas Painter and Eloisa Rahman (2010)  The Complete Stress Management Workbook by Gene Arevalo, Brandin You and Fito Henning (1996)  Online programs  www.SHUTi.me (pronounced shut eye). There is a fee for this program.   www.sleepIO.com (pronounced sleep ee oh). There is a fee for this program.  Other online resources  Deep breathing and progressive muscle relaxation (PMR):    http://www.Zipments  Meditation:    www.Death by Party    www.NextPrinciplesguidedIndelsulmeditationIndelsulsite.com  Guided imagery:    http://www.Zipments    http://Xeneta.InfraReDx  (click on  Resource Library,  then choose  Meditation, Relaxation, Guided Imagery )  Apps for your mobile device:    Autogenic Training Progressive Muscle Relaxation by [employer] Mick    Calm: Meditation and Sleep Stories by Calm.com, Inc.    Mimoco Timer   Meditation Sami by [employer].  This is not a prescription and these resources are optional. You must pay for any costs when using these resources. Please ask your insurance carrier if you can be reimbursed for these resources. If so, you are responsible for sending the needed details to your insurance carrier. These resources may also be tax deductible as medical expenses. Check with your .  Date Last Reviewed: 5/18/2018  Clinically reviewed by Franc Lucas PsyD, JACQUELIN, [facility], Director of the Insomnia and Behavioral Sleep Health Program, [facility].    7814-2331 [employer]. [address] [place] [address], [place] [address]. All rights reserved. This information is not intended as a substitute for professional medical care. Always follow your healthcare professional's instructions.

## 2019-05-29 NOTE — PROGRESS NOTES
"Subjective     Liane CARYN Key is a 49 year old female who presents to clinic today for the following health issues:    HPI   Abdominal Pain      Duration: 3 days    Description (location/character/radiation): pain around belly buttton       Associated flank pain: None    Intensity:  5/10 at worse    Accompanying signs and symptoms:        Fever/Chills: no        Gas/Bloating: YES- bloated       Nausea/vomitting: no        Diarrhea: no        Dysuria or Hematuria: no     History (previous similar pain/trauma/previous testing): none    Precipitating or alleviating factors:       Pain worse with eating/BM/urination: no       Pain relieved by BM: no     Therapies tried and outcome: Aleve, Ibuprofen and Tylenol    LMP:  not applicable    Patient states that the pain occurred one day only.  She was drinking heavily that day and is unsure if it is related.  She says she has difficulty sleeping.  Sleep hygiene reviewed.  She will minimize OTC agents during the next month.       Review of Systems   ROS COMP: Constitutional, HEENT, cardiovascular, pulmonary, gi and gu systems are negative, except as otherwise noted.      Objective    /70   Pulse 91   Temp 98  F (36.7  C) (Oral)   Resp 18   Ht 1.665 m (5' 5.54\")   Wt 92.6 kg (204 lb 3.2 oz)   LMP 06/07/2018   SpO2 99%   BMI 33.43 kg/m    Body mass index is 33.43 kg/m .  Physical Exam   GENERAL: healthy, alert and no distress  NECK: no adenopathy, no asymmetry, masses, or scars and thyroid normal to palpation  RESP: lungs clear to auscultation - no rales, rhonchi or wheezes  CV: regular rate and rhythm, normal S1 S2, no S3 or S4, no murmur, click or rub, no peripheral edema and peripheral pulses strong  ABDOMEN: soft, nontender, no hepatosplenomegaly, no masses and bowel sounds normal  MS: no gross musculoskeletal defects noted, no edema  SKIN: no suspicious lesions or rashes    Diagnostic Test Results:  none         Assessment & Plan     1. Unprotected sex  - " "CHLAMYDIA TRACHOMATIS PCR  - NEISSERIA GONORRHOEA PCR    2. Insomnia, unspecified type  Patient education materials dispensed and reviewed.         BMI:   Estimated body mass index is 33.43 kg/m  as calculated from the following:    Height as of this encounter: 1.665 m (5' 5.54\").    Weight as of this encounter: 92.6 kg (204 lb 3.2 oz).       Follow up if symptoms should persist, change or worsen.  Follow up on labs  Patient amenable to this follow up plan.      No follow-ups on file.    Vanessa Cole PA-C  [facility]      "

## 2019-05-30 ENCOUNTER — TELEPHONE (OUTPATIENT)
Dept: FAMILY MEDICINE | Facility: CLINIC | Age: 49
End: 2019-05-30

## 2019-05-30 DIAGNOSIS — Z72.51 UNPROTECTED SEX: Primary | ICD-10-CM

## 2019-05-30 DIAGNOSIS — Z72.51 UNPROTECTED SEX: ICD-10-CM

## 2019-05-30 PROCEDURE — 87491 CHLMYD TRACH DNA AMP PROBE: CPT | Performed by: PHYSICIAN ASSISTANT

## 2019-05-30 PROCEDURE — 87591 N.GONORRHOEAE DNA AMP PROB: CPT | Performed by: PHYSICIAN ASSISTANT

## 2019-05-30 NOTE — TELEPHONE ENCOUNTER
Reason for Call:  Request for results:    Name of test or procedure:  Labs     Date of test of procedure:  5-29-19    Location of the test or procedure:  [place]     OK to leave the result message on voice mail or with a family member? YES    Phone number Patient can be reached at:  Home number on file [phone] (home)    Additional comments:  Na     Call taken on 5/30/2019 at 8:28 AM by Yudy Hanks

## 2019-05-30 NOTE — TELEPHONE ENCOUNTER
Spoke with Kareen in the lab and they did not receive a urine or a swab from yesterday. Pt will need to come in for a lab only appointment for this.     Called and left detailed message stating we did not receive a specimen and she will need to come in for a lab only appointment. Please call [phone] to schedule, call back to the RN hotline if any further questions or concerns.    Isabelle Goins RN  [facility]

## 2019-06-02 LAB
C TRACH DNA SPEC QL NAA+PROBE: NEGATIVE
N GONORRHOEA DNA SPEC QL NAA+PROBE: NEGATIVE
SPECIMEN SOURCE: NORMAL
SPECIMEN SOURCE: NORMAL
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
## 2019-07-16 ENCOUNTER — OFFICE VISIT (OUTPATIENT)
Dept: FAMILY MEDICINE | Facility: CLINIC | Age: 49
End: 2019-07-16
Payer: MEDICAID

## 2019-07-16 ENCOUNTER — ANCILLARY PROCEDURE (OUTPATIENT)
Dept: GENERAL RADIOLOGY | Facility: CLINIC | Age: 49
End: 2019-07-16
Attending: PHYSICIAN ASSISTANT
Payer: MEDICAID

## 2019-07-16 VITALS
RESPIRATION RATE: 20 BRPM | DIASTOLIC BLOOD PRESSURE: 60 MMHG | BODY MASS INDEX: 34.33 KG/M2 | HEIGHT: 66 IN | HEART RATE: 97 BPM | WEIGHT: 213.6 LBS | OXYGEN SATURATION: 100 % | TEMPERATURE: 97.8 F | SYSTOLIC BLOOD PRESSURE: 112 MMHG

## 2019-07-16 DIAGNOSIS — S99.922A FOOT INJURY, LEFT, INITIAL ENCOUNTER: ICD-10-CM

## 2019-07-16 DIAGNOSIS — S92.522A CLOSED DISPLACED FRACTURE OF MIDDLE PHALANX OF LESSER TOE OF LEFT FOOT, INITIAL ENCOUNTER: ICD-10-CM

## 2019-07-16 DIAGNOSIS — S99.922A FOOT INJURY, LEFT, INITIAL ENCOUNTER: Primary | ICD-10-CM

## 2019-07-16 DIAGNOSIS — M54.50 ACUTE MIDLINE LOW BACK PAIN WITHOUT SCIATICA: ICD-10-CM

## 2019-07-16 PROCEDURE — 73630 X-RAY EXAM OF FOOT: CPT | Mod: LT

## 2019-07-16 PROCEDURE — 99214 OFFICE O/P EST MOD 30 MIN: CPT | Performed by: PHYSICIAN ASSISTANT

## 2019-07-16 RX ORDER — SENNOSIDES 8.6 MG
650 CAPSULE ORAL EVERY 8 HOURS PRN
Qty: 60 TABLET | Refills: 1 | Status: SHIPPED | OUTPATIENT
Start: 2019-07-16 | End: 2021-10-12

## 2019-07-16 RX ORDER — PREDNISONE 20 MG/1
40 TABLET ORAL DAILY
Qty: 10 TABLET | Refills: 0 | Status: SHIPPED | OUTPATIENT
Start: 2019-07-16 | End: 2019-07-23

## 2019-07-16 RX ORDER — NAPROXEN 500 MG/1
500 TABLET ORAL 2 TIMES DAILY WITH MEALS
Qty: 30 TABLET | Refills: 1 | Status: SHIPPED | OUTPATIENT
Start: 2019-07-16 | End: 2020-08-10

## 2019-07-16 ASSESSMENT — MIFFLIN-ST. JEOR: SCORE: 1603.25

## 2019-07-16 ASSESSMENT — PAIN SCALES - GENERAL: PAINLEVEL: WORST PAIN (10)

## 2019-07-16 NOTE — PATIENT INSTRUCTIONS
RICE:  Rest: Do not use affected limb or joint.  Ice: 20 minutes, 3 x daily.  Do not use heat.  Compression: Ace wrap or brace as instructed.  Elevation:  Keep joint elevated when not in use.

## 2019-07-18 ENCOUNTER — OFFICE VISIT (OUTPATIENT)
Dept: PODIATRY | Facility: CLINIC | Age: 49
End: 2019-07-18
Payer: MEDICAID

## 2019-07-18 VITALS
HEART RATE: 90 BPM | DIASTOLIC BLOOD PRESSURE: 80 MMHG | WEIGHT: 213 LBS | SYSTOLIC BLOOD PRESSURE: 116 MMHG | BODY MASS INDEX: 34.87 KG/M2

## 2019-07-18 DIAGNOSIS — S92.515A CLOSED NONDISPLACED FRACTURE OF PROXIMAL PHALANX OF LESSER TOE OF LEFT FOOT, INITIAL ENCOUNTER: Primary | ICD-10-CM

## 2019-07-18 PROCEDURE — 99243 OFF/OP CNSLTJ NEW/EST LOW 30: CPT | Mod: 57 | Performed by: PODIATRIST

## 2019-07-18 NOTE — PATIENT INSTRUCTIONS
Weight management plan: Patient was referred to their PCP to discuss a diet and exercise plan.  We wish you continued good healing. If you have any questions or concerns, please do not hesitate to contact us at [phone]    Please remember to call and schedule a follow up appointment if one was recommended at your earliest convenience.   PODIATRY CLINIC HOURS  TELEPHONE NUMBER    Dr. Ruben Casas D.P.M [facility]    Clinics:  [facility]    [facility]   Tuesday 1PM-6PM  [place]  Wednesday 7AM-2PM  [facility]  Thursday 10AM-6PM  [place]  Friday 7AM-3PM  [place]  Specialty schedulers:   [phone] to make an appointment with any Specialty Provider.        Urgent Care locations:    [facility] Monday-Friday 5 pm - 9 pm. Saturday-Sunday 9 am -5pm    Monday-Friday 11 am - 9 pm Saturday 9 am - 5 pm     Monday-Sunday 12 noon-8PM [phone]     If you need a medication refill, please contact us you may need lab work and/or a follow up visit prior to your refill (i.e. Antifungal medications).    [facility] (secure e-mail communication and access to your chart) to send a message or to make an appointment.    If MRI needed please call [place] [facility] at [phone]
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
## 2019-07-19 ENCOUNTER — TELEPHONE (OUTPATIENT)
Dept: FAMILY MEDICINE | Facility: CLINIC | Age: 49
End: 2019-07-19

## 2019-07-19 NOTE — TELEPHONE ENCOUNTER
Reason for Call:  Other prescription    Detailed comments:  Patient calling. The prescriptions for prednisone and naproxen are not helping her. Please call and advise.     Phone Number Patient can be reached at: Home number on file [phone] (home)    Best Time:  Any     Can we leave a detailed message on this number? YES    Call taken on 7/19/2019 at 7:18 AM by Yudy Hanks

## 2019-07-19 NOTE — TELEPHONE ENCOUNTER
Patient calling back to see if she will be able to speak to some in regards to this message and will like a call back today.

## 2019-07-19 NOTE — TELEPHONE ENCOUNTER
Naproxen is to be started after Prednisone complete.  Add Tylenol Arthritis 2 tablets every 8 hours and Ice 20 minutes 4 x daily to currently regimen.  Schedule Physical Therapy, or see if they are able to see her as a walk in today.  If she is doing these already let me know.   Thank you.  Roberto RENEE

## 2019-07-19 NOTE — TELEPHONE ENCOUNTER
Patient states she is already doing all of this. That is why she started the naproxen early hoping it would help with the pain. States pain is worse, rates it a 15/10 - off the charts. Back pain hurts worse than her toe. Physical Therapy is unable to get her in until next month.    Rula Parada RN - BC

## 2019-07-23 ENCOUNTER — OFFICE VISIT (OUTPATIENT)
Dept: OBGYN | Facility: CLINIC | Age: 49
End: 2019-07-23
Payer: MEDICAID

## 2019-07-23 VITALS
DIASTOLIC BLOOD PRESSURE: 82 MMHG | SYSTOLIC BLOOD PRESSURE: 137 MMHG | BODY MASS INDEX: 35.26 KG/M2 | WEIGHT: 215.4 LBS | OXYGEN SATURATION: 100 % | HEART RATE: 95 BPM

## 2019-07-23 DIAGNOSIS — A59.9 TRICHIMONIASIS: Primary | ICD-10-CM

## 2019-07-23 DIAGNOSIS — N89.8 VAGINAL DISCHARGE: ICD-10-CM

## 2019-07-23 LAB
SPECIMEN SOURCE: ABNORMAL
WET PREP SPEC: ABNORMAL

## 2019-07-23 PROCEDURE — 99214 OFFICE O/P EST MOD 30 MIN: CPT | Performed by: OBSTETRICS & GYNECOLOGY

## 2019-07-23 PROCEDURE — 87210 SMEAR WET MOUNT SALINE/INK: CPT | Performed by: OBSTETRICS & GYNECOLOGY

## 2019-07-23 RX ORDER — METRONIDAZOLE 500 MG/1
500 TABLET ORAL 3 TIMES DAILY
Qty: 4 TABLET | Refills: 1 | Status: SHIPPED | OUTPATIENT
Start: 2019-07-23 | End: 2019-07-23

## 2019-07-23 RX ORDER — METRONIDAZOLE 500 MG/1
500 TABLET ORAL 2 TIMES DAILY
Qty: 14 TABLET | Refills: 0 | Status: SHIPPED | OUTPATIENT
Start: 2019-07-23 | End: 2019-07-30

## 2019-07-23 NOTE — PROGRESS NOTES
Princess Key is a 49 year old female, .  She had an LAVH last year.  She has had one month of discharge and some pink discharge when wiping.  She had GC and Chlamydia testing the end of May and the results were negative.   She has had some pressure sensation when she voids.  She first noted this one month ago.  She has not noted any aggravating or alleviating factors.    She has not had symptoms of prolapse.  She has not had urinary frequency or dysuria.       Past Medical History:   Diagnosis Date     Graves disease      Hypothyroidism following radioiodine therapy      Obesity      Syphilis 2015     Past Surgical History:   Procedure Laterality Date     CHOLECYSTECTOMY            HYSTERECTOMY, PAP NO LONGER INDICATED  2018     LAPAROSCOPIC ASSISTED HYSTERECTOMY VAGINAL  2018    adenomyosis, fibroids      OPEN REDUCTION INTERNAL FIXATION ANKLE Left           TUBAL LIGATION       OB History    Para Term  AB Living   4 4 0 0 0 4   SAB TAB Ectopic Multiple Live Births   0 0 0 0 4      # Outcome Date GA Lbr Bernabe/2nd Weight Sex Delivery Anes PTL Lv   4 Para            3 Para            2 Para            1 Para              Current Outpatient Medications   Medication Sig Dispense Refill     acetaminophen (TYLENOL) 650 MG CR tablet Take 1 tablet (650 mg) by mouth every 8 hours as needed for pain 60 tablet 1     ferrous sulfate (IRON) 325 (65 Fe) MG tablet Take by mouth daily (with breakfast)       levothyroxine (SYNTHROID/LEVOTHROID) 125 MCG tablet TAKE 1 TABLET (125 MCG) BY MOUTH DAILY 30 tablet 7     metroNIDAZOLE (FLAGYL) 500 MG tablet Take 1 tablet (500 mg) by mouth 2 times daily for 7 days 14 tablet 0     naproxen (NAPROSYN) 500 MG tablet Take 1 tablet (500 mg) by mouth 2 times daily (with meals) Start after prednisone 30 tablet 1     norethindrone-ethinyl estradiol (ORTHO-NOVUM 1-35 TAB,NORTREL 1-35 TAB) 1-35 MG-MCG per tablet Take 1 tablet by mouth daily  (Patient not taking: Reported on 2019) 84 tablet 4     Prenatal MV-Min-Fe Fum-FA-DHA (PRENATAL 1 PO) Take 1 tablet by mouth daily         Social History     Socioeconomic History     Marital status:      Spouse name: Not on file     Number of children: 4     Years of education: Not on file     Highest education level: Not on file   Occupational History     Occupation: works with disabled      Employer: [employer]   Social Needs     Financial resource strain: Not on file     Food insecurity:     Worry: Not on file     Inability: Not on file     Transportation needs:     Medical: Not on file     Non-medical: Not on file   Tobacco Use     Smoking status: Former Smoker     Packs/day: 0.20     Years: 5.00     Pack years: 1.00     Types: Cigarettes     Last attempt to quit: 2012     Years since quittin.1     Smokeless tobacco: Never Used   Substance and Sexual Activity     Alcohol use: Yes     Comment: occasional      Drug use: Yes     Comment: marjuania - smokes it for appetite      Sexual activity: Yes     Partners: Male     Birth control/protection: Female Surgical, Pill   Lifestyle     Physical activity:     Days per week: Not on file     Minutes per session: Not on file     Stress: Not on file   Relationships     Social connections:     Talks on phone: Not on file     Gets together: Not on file     Attends Mandaen service: Not on file     Active member of club or organization: Not on file     Attends meetings of clubs or organizations: Not on file     Relationship status: Not on file     Intimate partner violence:     Fear of current or ex partner: Not on file     Emotionally abused: Not on file     Physically abused: Not on file     Forced sexual activity: Not on file   Other Topics Concern     Parent/sibling w/ CABG, MI or angioplasty before 65F 55M? No   Social History Narrative    Lives with her 2 grand kids at home.        Family History   Problem Relation Age of Onset      Unknown/Adopted Maternal Grandmother      Unknown/Adopted Maternal Grandfather      Unknown/Adopted Paternal Grandmother      Unknown/Adopted Paternal Grandfather      Depression Sister      Thyroid Disease Maternal Aunt      Cancer No family hx of      Diabetes No family hx of      Hypertension No family hx of      Cerebrovascular Disease No family hx of      Glaucoma No family hx of      Macular Degeneration No family hx of        Review of Systems:  10 point ROS of systems including Constitutional, Eyes, Respiratory, Cardiovascular, Gastroenterology, Genitourinary, Integumentary, Muscularskeletal, Psychiatric were all negative except for pertinent positives noted in my HPI and in the PMH.      Exam  /82 (BP Location: Right arm, Cuff Size: Adult Regular)   Pulse 95   Wt 97.7 kg (215 lb 6.4 oz)   LMP 06/07/2018   SpO2 100%   BMI 35.26 kg/m    General:  WNWD female, NAD  Alert  Oriented x 3  Gait:  Normal  Skin:  Normal skin turgor  HEENT:  NC/AT, EOMI  Abdomen:  Non-tender, non-distended.  Vulva: No external lesions, normal hair distribution, no adenopathy  BUS:  Normal, no masses noted  Urethra:  No hypermobility seen  Urethral meatus:  No masses noted  Vagina: Moist, pink, some discharge, well rugated, she has 3 to 4 small erythematous lesions, about 1.5 mm.  Minimal blood at sites  Cervix: absent   Uterus: absent   Perianal:  No masses noted  Extremities:  No clubbing, no cyanosis and no edema.    Wet Prep  Component      Latest Ref Rng & Units 7/23/2019 7/23/2019 7/23/2019          11:24 AM 11:24 AM 11:24 AM   Specimen Description       Vagina     Wet Prep       Trichomonas seen (A) No clue cells seen No yeast seen     Component      Latest Ref Rng & Units 7/23/2019          11:24 AM   Specimen Description          Wet Prep       WBC'S seen . . .       Assessment  Trichomonas  Vaginal discharge       Plan  Prior to the wet prep results, the red lesions were treated with Silver Nitrate in the thought  that these might be small areas of granulation tissue (although not typical of granulation tissue).    Flagyl prescription given to patient for 7 days.  She may use the alternative Trich treatment and use 2 gm orally and then her partner may use 2 gm orally also.  No alcohol to be consumed.  Hold on sex until both patient and partner treated.    Safe sex discussed, especially if partner continues having other partners.   Questions seem to be answered.     Eliseo Fox MD

## 2019-07-23 NOTE — TELEPHONE ENCOUNTER
If pain is escalating despite medication management then she needs to come in for follow up.  If it is staying the same we can try short term muscle Relaxer or narcotic therapies.  She can come  a script.   Roberto RENEE

## 2019-07-24 ENCOUNTER — TELEPHONE (OUTPATIENT)
Dept: FAMILY MEDICINE | Facility: CLINIC | Age: 49
End: 2019-07-24

## 2019-07-24 NOTE — TELEPHONE ENCOUNTER
Please advise.   Was patient supposed to receive a CAM boot or did she need to see orthopedics first for further evaluation? No mention of boot in OV notes.  Erinn Conner RN

## 2019-07-24 NOTE — TELEPHONE ENCOUNTER
Reason for call:  Request for cam walker boot  Patient called regarding (reason for call): appointment  Additional comments: Patient was seen on 7-16-19 and states she never received the cam walker boot to help support her ankle. She has a fractured toe. Please call.    Phone number to reach patient:  Home number on file [phone] (home)    Best Time:  any    Can we leave a detailed message on this number?  YES

## 2019-08-01 ENCOUNTER — THERAPY VISIT (OUTPATIENT)
Dept: PHYSICAL THERAPY | Facility: CLINIC | Age: 49
End: 2019-08-01
Payer: COMMERCIAL

## 2019-08-01 DIAGNOSIS — M54.50 ACUTE MIDLINE LOW BACK PAIN WITHOUT SCIATICA: ICD-10-CM

## 2019-08-01 DIAGNOSIS — S99.922A FOOT INJURY, LEFT, INITIAL ENCOUNTER: ICD-10-CM

## 2019-08-01 PROCEDURE — 97110 THERAPEUTIC EXERCISES: CPT | Mod: GP | Performed by: PHYSICAL THERAPIST

## 2019-08-01 PROCEDURE — 97140 MANUAL THERAPY 1/> REGIONS: CPT | Mod: GP | Performed by: PHYSICAL THERAPIST

## 2019-08-01 PROCEDURE — 97161 PT EVAL LOW COMPLEX 20 MIN: CPT | Mod: GP | Performed by: PHYSICAL THERAPIST
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
## 2019-08-01 NOTE — LETTER
GIORGIO HERRERA PT  [address]  [address]  [place] [address]  [phone]    2019    Re: Liane Key   :   1970  MRN:  [id number]   REFERRING PHYSICIAN:   MARIO Mattson PT  Date of Initial Evaluation:  2019  Visits:  Rxs Used: 1  Reason for Referral:     Foot injury, left, initial encounter  Acute midline low back pain without sciatica    EVALUATION SUMMARY    [facility] Initial Evaluation  Subjective:  The history is provided by the patient.   Liane Key being seen for lbp and Left foot .   Problem began 2019. Where condition occurred: at home and in the community.Problem occurred: a shopping cart ran over my foot    and reported as 10/10 (foot 10/10) on pain scale. General health as reported by patient is good. Pertinent medical history includes:  Sleep disorder/apnea and thyroid problems. Primary job tasks include:  Computer work, driving, prolonged standing and prolonged sitting.  Pain is described as aching and is intermittent.  Since onset symptoms are unchanged. Special tests:  X-ray. Patient is / . Restrictions include:  Working in normal job without restrictions.  Barriers include:  None as reported by patient.  Red flags:  None as reported by patient.  Type of problem:  Lumbar and sacroiliac  Condition occurred with:  A jump. This is a new condition   Problem details: 19 pt pulled her LE away suddenly when her foot was run over by a cart . She describes left LBP w/ pain across her LB.  She also has a fractured 4th toe on her left foot.  It appears very swollen and is tender to touch and movment.  No treatment would be appropriate for this at this time..   Patient reports pain:  SI joint left and lower lumbar spine. Radiates to:  No radiation. Associated symptoms:  Loss of motion/stiffness. Symptoms are exacerbated by standing, walking and bending and relieved by ice.               Objective:  Lumbar/SI Evaluation  ROM:    AROM Lumbar:   Flexion:        Mod loss   Ext:                    Modloss ++    Side Bend:        Left:     Right:   Rotation:           Left:     Right:   Side Glide:        Left:     Right:   Re: Liane Key   :   1970     Lumbar Myotomes:  not assessed  Lumbar DTR's:  not assessed  Cord Signs:  not assessed  Lumbar Dermtomes:  not assessed  Neural Tension/Mobility:  Lumbar:  Normal      Lumbar Palpation:    Tenderness present at Left:    Quadratus Lumborum; Piriformis; PSIS and Gluteus Medius  Tenderness present at Right: Quadratus Lumborum    Spinal Segmental Conclusions:   Level: Hypo noted at L5, L4 and S1    SI joint/Sacrum:    Left SI dysf, ant rot, flare, Rt>left   Left positive at:    Ilium Ventromedial  Right negative at:  Ilium Ventromedial  Sacral conclusion left:  Inflare, anterior inominate, elevated pubic sym, sacral torsion and upslip       Assessment/Plan:    Patient is a 49 year old female with lumbar and sacral complaints.    Patient has the following significant findings with corresponding treatment plan.                Diagnosis 1:  lbp   Pain -  manual therapy, splint/taping/bracing/orthotics, self management, directional preference exercise and home program  Decreased ROM/flexibility - manual therapy, therapeutic exercise and home program  Decreased joint mobility - manual therapy, therapeutic exercise and home program  Decreased proprioception - neuro re-education, therapeutic activities and home program  Impaired muscle performance - neuro re-education and home program    Therapy Evaluation Codes:   1) History comprised of:   Personal factors that impact the plan of care:      Coping style, Overall behavior pattern and Past/current experiences.    Comorbidity factors that impact the plan of care are:      Sleep disorder/apnea and thyroid .     Medications impacting care: Pain and thyroid .  2) Examination of Body Systems comprised  of:   Body structures and functions that impact the plan of care:      Lumbar spine and Sacral illiac joint.   Activity limitations that impact the plan of care are:      Bending, Driving, Lifting, Reading/Computer work, Squatting/kneeling, Standing and Walking.  3) Clinical presentation characteristics are:   Stable/Uncomplicated.  4) Decision-Making    Low complexity using standardized patient assessment instrument and/or measureable assessment of functional outcome.  Cumulative Therapy Evaluation is: Low complexity.      Re: Liane Key   :   1970    Previous and current functional limitations:  (See Goal Flow Sheet for this information)    Short term and Long term goals: (See Goal Flow Sheet for this information)     Communication ability:  Patient appears to be able to clearly communicate and understand verbal and written communication and follow directions correctly.  Treatment Explanation - The following has been discussed with the patient:   RX ordered/plan of care  Anticipated outcomes  Possible risks and side effects  This patient would benefit from PT intervention to resume normal activities.   Rehab potential is good.    Frequency:  1 X week, once daily  Duration:  for 6 weeks  Discharge Plan:  Achieve all LTG.  Independent in home treatment program.  Reach maximal therapeutic benefit.    Please refer to the daily flowsheet for treatment today, total treatment time and time spent performing 1:1 timed codes.         Thank you for your referral.    INQUIRIES  Therapist: Vin Fox PT  GIORGIO HERRERA PT  [address]  [address]  [place] [address]  Phone: [phone]  Fax: [phone]

## 2019-09-17 NOTE — PROGRESS NOTES
"Subjective     Liane CARYN Key is a 49 year old female who presents to clinic today for the following health issues:    HPI   Insomnia  Onset: 1 year ago    Description:   Time to fall asleep (sleep latency): a long time, tries to watch tv to help  Middle of night awakening:  YES  Early morning awakening:  YES    Progression of Symptoms:  worsening    Accompanying Signs & Symptoms:  Daytime sleepiness/napping: YES  Excessive snoring/apnea: no   Restless legs: no   Frequent urination: no  Chronic pain:  no    History:  Prior Insomnia: no    Precipitating factors:   New stressful situation: no  Caffeine intake: no  OTC decongestants: no  Any new medications: no    Alleviating factors:  Self medicating (alcohol, etc.):  no    Therapies Tried and outcome: Melatonin takes one to two per night, wakes up after 1.5 hours then takes more. Tried a sleeping aid she got at the [employer] store, both haven't helped.        Review of Systems   ROS COMP: Constitutional, HEENT, cardiovascular, pulmonary, gi and gu systems are negative, except as otherwise noted.      Objective    /78   Pulse 83   Temp 97.8  F (36.6  C) (Oral)   Resp 18   Ht 1.665 m (5' 5.54\")   Wt 98.9 kg (218 lb)   LMP 06/07/2018   SpO2 100%   BMI 35.69 kg/m    Body mass index is 35.69 kg/m .  Physical Exam     Total visit time is 20 Minutes with > 15 Minutes spent in care and consultation regarding insomnia with medication mgt and follow up plan.      Diagnostic Test Results:  none         Assessment & Plan     Use medication as directed.  Follow up if symptoms should persist, change or worsen.  Follow up in 1 month  Patient education materials dispensed and reviewed.  Patient amenable to this follow up plan.          No follow-ups on file.    Vanessa Cole PA-C  [facility]      "

## 2019-09-18 ENCOUNTER — OFFICE VISIT (OUTPATIENT)
Dept: FAMILY MEDICINE | Facility: CLINIC | Age: 49
End: 2019-09-18
Payer: COMMERCIAL

## 2019-09-18 VITALS
WEIGHT: 218 LBS | RESPIRATION RATE: 18 BRPM | TEMPERATURE: 97.8 F | HEART RATE: 83 BPM | DIASTOLIC BLOOD PRESSURE: 78 MMHG | OXYGEN SATURATION: 100 % | SYSTOLIC BLOOD PRESSURE: 120 MMHG | HEIGHT: 66 IN | BODY MASS INDEX: 35.03 KG/M2

## 2019-09-18 DIAGNOSIS — F51.01 PRIMARY INSOMNIA: Primary | ICD-10-CM

## 2019-09-18 PROCEDURE — 99213 OFFICE O/P EST LOW 20 MIN: CPT | Performed by: PHYSICIAN ASSISTANT

## 2019-09-18 ASSESSMENT — MIFFLIN-ST. JEOR: SCORE: 1623.21

## 2019-09-18 NOTE — PATIENT INSTRUCTIONS
Patient Education     Treating Insomnia     Learning to relax before bedtime can improve your sleep.   Good sleeping habits are a key part of treatment. If needed, some medicines may help you sleep better at first. Making healthy lifestyle changes and learning to relax can improve your sleep. Treating insomnia takes commitment. But trust that your efforts will pay off. Be sure to talk with your healthcare provider before taking any medicine.  Healthy lifestyle  Your lifestyle affects your health and your sleep. Here are some healthy habits:    Keep a regular sleep schedule. Go to bed and get up at the same time each day.    Exercise regularly. It may help you reduce stress. Avoid strenuous exercise for 2 to 4 hours before bedtime.    Avoid or limit naps, especially in the late afternoon.    Use your bed only for sleep and sex.    Don t spend too much time in bed trying to fall asleep. If you can t fall asleep, get up and do something (no electronics) until you become tired and drowsy.    Avoid or limit caffeine and nicotine for up to 6 hours before bedtime. They can keep you awake at night.     Also avoid alcohol for at least 4 to 6 hours before bedtime. It may help you fall asleep at first. But you will have more awakenings during the night. And your sleep will not be restful.  Before bedtime  To sleep better every night, try these tips:    Have a bedtime routine to let your body and mind know when it s time to sleep.    Think of going to bed as relaxing and enjoyable. Sleep will come sooner.    If your worries don t let you sleep, write them down in a diary. Then close it, and go to bed.    Make sure the room is not too hot or too cold. If it s not dark enough, an eye mask can help. If it s noisy, try using earplugs.    Don't eat a large meal just before bedtime.    Remove noises, bright lights, TVs, cell phones, and computers from your sleeping environment.    Use a comfortable mattress and pillow.  Learn to  relax  Stress, anxiety, and body tension may keep you awake at night. To unwind before bedtime, try a warm bath, meditation, or yoga. Also try the following:    Deep breathing. Sit or lie back in a chair. Take a slow, deep breath. Hold it for 5 counts. Then breathe out slowly through your mouth. Keep doing this until you feel relaxed.    Progressive muscle relaxation. Tense and then relax the muscles in your body as you breathe deeply. Start with your feet and work up your body to your neck and face.  Cognitive Behavioral Therapy (CBT)  CBT is the most effective treatment for long-term insomnia. It tries to address the underlying causes of your sleep problems, including your habits and how you think about sleep.  Individual Therapy  Franc Lucas PsyD, JACQUELIN  Insomnia   [facility]: [phone]    [facility]: [phone]  [facility]  Visit [email or web address] or call [phone] to find a clinic close to you.  Suggested resources  The resources below may help you relax. This list is for information only. [facility] is not responsible for the quality of services or the actions of any person or organization. There may be a fee to use some of these resources.  Insomnia treatment books  Merritt Mind by Rosalva Coates and Pamela Aceves (2013)  Overcoming Insomnia by Bill Castro and Rosalva Coates (2008)  Quiet Your Mind and Get to Sleep: Solutions to Insomnia for Those with Depression, Anxiety or Chronic Pain by Rosalva Coates and Pamela Aceves (2009)  No More Sleepless Nights by Joe Marinelli and Anne Negrete (1996)  Say Merritt to Insomnia by Jose Andersen (2009)  The Insomnia Workbook by Ana Flanagan and Reji Zamorano (2009)  The Insomnia Answer by Derek Cortes and Leandro Mcduffie (2006)  Stress management and relaxation books  The Relaxation and Stress Reduction Workbook by Elisha Aburto,  Dasia Clinton and Daniel Chan (2008)  Stress Management Workbook: Techniques and Self-Assessment Procedures by Mamta Vega and Juan Daniel Reinoso (1997)  A Mindfulness-Based Stress Reduction Workbook by Thomas Painter and Eloisa Rahman (2010)  The Complete Stress Management Workbook by Gene Arevalo, Brandin You and Fito Henning (1996)  Online programs  www.SHUTi.me (pronounced shut eye). There is a fee for this program.   www.sleepIO.com (pronounced sleep ee oh). There is a fee for this program.  Other online resources  Deep breathing and progressive muscle relaxation (PMR):    http://www.Method CRM  Meditation:    www.ELVPHDranStarsVu    www.StartupxploreguidedFast Track AsiameditationFast Track Asiasite.com  Guided imagery:    http://www.Method CRM    http://Mi[employer]  (click on  Resource Library,  then choose  Meditation, Relaxation, Guided Imagery )  Apps for your mobile device:    Autogenic Training Progressive Muscle Relaxation by Lootsie Mick    Calm: Meditation and Sleep Stories by Calm.com, Inc.    [employer] Timer - Meditation Sami by [employer].  This is not a prescription and these resources are optional. You must pay for any costs when using these resources. Please ask your insurance carrier if you can be reimbursed for these resources. If so, you are responsible for sending the needed details to your insurance carrier. These resources may also be tax deductible as medical expenses. Check with your .  Date Last Reviewed: 5/18/2018  Clinically reviewed by Franc Lucas PsyD, JACQUELIN, [facility], Director of the Insomnia and Behavioral Sleep Health Program, [facility].    4594-4390 [employer]. [address] [place] [address], [place] [address]. All rights reserved. This information is not intended as a substitute for professional medical care. Always follow your healthcare professional's instructions.

## 2019-10-11 NOTE — PROGRESS NOTES
"Subjective     Liane CARYN Key is a 49 year old female who presents to clinic today for the following health issues:    HPI   Patient presents with:  RECHECK: ON SLEEP    Patient has noticed improved sleep and she is able to get up and exercise in the morning.  Has also noted that improvement persists with daily use.     Review of Systems   ROS COMP: Constitutional, HEENT, cardiovascular, pulmonary, gi and gu systems are negative, except as otherwise noted.      Objective    /86   Pulse 103   Temp 97.4  F (36.3  C) (Oral)   Resp 18   Ht 1.664 m (5' 5.5\")   Wt 99.8 kg (220 lb)   LMP 06/07/2018   SpO2 100%   BMI 36.05 kg/m    Body mass index is 36.05 kg/m .  Physical Exam     Total visit time is 15 Minutes with > 10 Minutes spent in care and consultation regarding Insomnia with medication management and follow up plan.      Diagnostic Test Results:  none         Assessment & Plan     1. Primary insomnia  - amitriptyline (ELAVIL) 50 MG tablet; Take 1 tablet (50 mg) by mouth At Bedtime  Dispense: 90 tablet; Refill: 1     Follow up in 6 months for recheck.  Sooner if needed.  Patient amenable to this follow up plan.     Vanessa Cole PA-C  [facility]      "

## 2019-10-17 ENCOUNTER — OFFICE VISIT (OUTPATIENT)
Dept: FAMILY MEDICINE | Facility: CLINIC | Age: 49
End: 2019-10-17
Payer: COMMERCIAL

## 2019-10-17 VITALS
BODY MASS INDEX: 35.36 KG/M2 | RESPIRATION RATE: 18 BRPM | DIASTOLIC BLOOD PRESSURE: 86 MMHG | WEIGHT: 220 LBS | OXYGEN SATURATION: 100 % | TEMPERATURE: 97.4 F | HEIGHT: 66 IN | SYSTOLIC BLOOD PRESSURE: 132 MMHG | HEART RATE: 103 BPM

## 2019-10-17 DIAGNOSIS — F51.01 PRIMARY INSOMNIA: ICD-10-CM

## 2019-10-17 PROCEDURE — 99213 OFFICE O/P EST LOW 20 MIN: CPT | Performed by: PHYSICIAN ASSISTANT

## 2019-10-17 RX ORDER — AMITRIPTYLINE HYDROCHLORIDE 50 MG/1
50 TABLET ORAL AT BEDTIME
Qty: 90 TABLET | Refills: 1 | Status: SHIPPED | OUTPATIENT
Start: 2019-10-17 | End: 2020-05-17

## 2019-10-17 ASSESSMENT — MIFFLIN-ST. JEOR: SCORE: 1631.72

## 2019-11-04 ENCOUNTER — HEALTH MAINTENANCE LETTER (OUTPATIENT)
Age: 49
End: 2019-11-04

## 2020-01-17 DIAGNOSIS — E89.0 HYPOTHYROIDISM FOLLOWING RADIOIODINE THERAPY: ICD-10-CM

## 2020-01-21 RX ORDER — LEVOTHYROXINE SODIUM 125 UG/1
125 TABLET ORAL DAILY
Qty: 30 TABLET | Refills: 0 | Status: SHIPPED | OUTPATIENT
Start: 2020-01-21 | End: 2020-02-18

## 2020-01-21 NOTE — TELEPHONE ENCOUNTER
"Medication is being filled for 1 time refill only due to:  Patient needs to be seen because due for labs in February.      Requested Prescriptions   Pending Prescriptions Disp Refills     levothyroxine (SYNTHROID/LEVOTHROID) 125 MCG tablet 30 tablet 7     Sig: Take 1 tablet (125 mcg) by mouth daily       Thyroid Protocol Passed - 1/21/2020 10:41 AM        Passed - Patient is 12 years or older        Passed - Recent (12 mo) or future (30 days) visit within the authorizing provider's specialty     Patient has had an office visit with the authorizing provider or a provider within the authorizing providers department within the previous 12 mos or has a future within next 30 days. See \"Patient Info\" tab in inbasket, or \"Choose Columns\" in Meds & Orders section of the refill encounter.              Passed - Medication is active on med list        Passed - Normal TSH on file in past 12 months     Recent Labs   Lab Test 02/19/19  1802   TSH 1.31              Passed - No active pregnancy on record     If patient is pregnant or has had a positive pregnancy test, please check TSH.          Passed - No positive pregnancy test in past 12 months     If patient is pregnant or has had a positive pregnancy test, please check TSH.          "

## 2020-02-18 ENCOUNTER — OFFICE VISIT (OUTPATIENT)
Dept: FAMILY MEDICINE | Facility: CLINIC | Age: 50
End: 2020-02-18
Payer: COMMERCIAL

## 2020-02-18 VITALS
RESPIRATION RATE: 16 BRPM | WEIGHT: 232.6 LBS | HEART RATE: 106 BPM | OXYGEN SATURATION: 100 % | HEIGHT: 66 IN | DIASTOLIC BLOOD PRESSURE: 80 MMHG | TEMPERATURE: 97 F | SYSTOLIC BLOOD PRESSURE: 122 MMHG | BODY MASS INDEX: 37.38 KG/M2

## 2020-02-18 DIAGNOSIS — E89.0 HYPOTHYROIDISM FOLLOWING RADIOIODINE THERAPY: ICD-10-CM

## 2020-02-18 DIAGNOSIS — Z86.39 HISTORY OF GRAVES' DISEASE: ICD-10-CM

## 2020-02-18 PROCEDURE — 36415 COLL VENOUS BLD VENIPUNCTURE: CPT | Performed by: FAMILY MEDICINE

## 2020-02-18 PROCEDURE — 84443 ASSAY THYROID STIM HORMONE: CPT | Performed by: FAMILY MEDICINE

## 2020-02-18 PROCEDURE — 99213 OFFICE O/P EST LOW 20 MIN: CPT | Performed by: FAMILY MEDICINE

## 2020-02-18 RX ORDER — LEVOTHYROXINE SODIUM 125 UG/1
125 TABLET ORAL DAILY
Qty: 90 TABLET | Refills: 3 | Status: SHIPPED | OUTPATIENT
Start: 2020-02-18 | End: 2021-04-16

## 2020-02-18 ASSESSMENT — MIFFLIN-ST. JEOR: SCORE: 1688.88

## 2020-02-18 NOTE — LETTER
[facility]  [address]  [place] [address]    February 19, 2020    Liane Key  [address] [address]          Dear Liane,    Normal Thyroid test     Enclosed is a copy of your results.     Results for orders placed or performed in visit on 02/18/20   TSH WITH FREE T4 REFLEX     Status: None   Result Value Ref Range    TSH 0.48 0.40 - 4.00 mU/L       If you have any questions or concerns, please call myself or my nurse at [phone].      Sincerely,        Nahomi Castañeda MD/abi

## 2020-02-18 NOTE — PROGRESS NOTES
Subjective     Liane Key is a 49 year old female who presents to clinic today for the following health issues:    HPI   Hypothyroidism Follow-up      Since last visit, patient describes the following symptoms: Weight stable, no hair loss, no skin changes, no constipation, no loose stools      How many servings of fruits and vegetables do you eat daily?  4 or more    On average, how many sweetened beverages do you drink each day (Examples: soda, juice, sweet tea, etc.  Do NOT count diet or artificially sweetened beverages)?   0    How many days per week do you exercise enough to make your heart beat faster? N/A    How many minutes a day do you exercise enough to make your heart beat faster? N/A    How many days per week do you miss taking your medication? 0      Patient Active Problem List   Diagnosis     CARDIOVASCULAR SCREENING; LDL GOAL LESS THAN 160     Hypothyroidism following radioiodine therapy     Obesity     Elevated blood pressure reading without diagnosis of hypertension     Family history of colon cancer     History of Graves' disease     Past Surgical History:   Procedure Laterality Date     CHOLECYSTECTOMY            HYSTERECTOMY, PAP NO LONGER INDICATED  2018     LAPAROSCOPIC ASSISTED HYSTERECTOMY VAGINAL  2018    adenomyosis, fibroids      OPEN REDUCTION INTERNAL FIXATION ANKLE Left           TUBAL LIGATION         Social History     Tobacco Use     Smoking status: Former Smoker     Packs/day: 0.20     Years: 5.00     Pack years: 1.00     Types: Cigarettes     Last attempt to quit: 2012     Years since quittin.7     Smokeless tobacco: Never Used   Substance Use Topics     Alcohol use: Yes     Comment: occasional      Family History   Problem Relation Age of Onset     Unknown/Adopted Maternal Grandmother      Unknown/Adopted Maternal Grandfather      Unknown/Adopted Paternal Grandmother      Unknown/Adopted Paternal Grandfather      Depression Sister      Thyroid  Disease Maternal Aunt      Cancer No family hx of      Diabetes No family hx of      Hypertension No family hx of      Cerebrovascular Disease No family hx of      Glaucoma No family hx of      Macular Degeneration No family hx of          Current Outpatient Medications   Medication Sig Dispense Refill     amitriptyline (ELAVIL) 50 MG tablet Take 1 tablet (50 mg) by mouth At Bedtime 90 tablet 1     ferrous sulfate (IRON) 325 (65 Fe) MG tablet Take by mouth daily (with breakfast)       levothyroxine 125 MCG PO tablet Take 1 tablet (125 mcg) by mouth daily Need to be seen in clinic for further refills 90 tablet 3     Prenatal MV-Min-Fe Fum-FA-DHA (PRENATAL 1 PO) Take 1 tablet by mouth daily       acetaminophen (TYLENOL) 650 MG CR tablet Take 1 tablet (650 mg) by mouth every 8 hours as needed for pain 60 tablet 1     naproxen (NAPROSYN) 500 MG tablet Take 1 tablet (500 mg) by mouth 2 times daily (with meals) Start after prednisone 30 tablet 1     norethindrone-ethinyl estradiol (ORTHO-NOVUM 1-35 TAB,NORTREL 1-35 TAB) 1-35 MG-MCG per tablet Take 1 tablet by mouth daily (Patient not taking: Reported on 1/8/2019) 84 tablet 4     No Known Allergies  Recent Labs   Lab Test 02/19/19  1802 12/26/18  0830  04/29/15  1115  08/28/14  1601  11/13/13  1655  06/11/12  1629 06/04/12  1222  03/01/12  1732   A1C  --   --   --   --   --  4.8  --   --   --   --   --   --   --    LDL 59  --   --  68  --   --   --   --   --   --   --   --  58   HDL 60  --   --  82  --   --   --   --   --   --   --   --  36*   TRIG 118  --   --  78  --   --   --   --   --   --   --   --  97   ALT  --   --   --   --   --   --   --  18  --  12 14   < > 27   CR  --   --   --   --   --   --   --  0.87  --   --  0.65  --  0.58   GFRESTIMATED  --   --   --   --   --   --   --  71  --   --  >90  --  >90   GFRESTBLACK  --   --   --   --   --   --   --  86  --   --  >90  --  >90   POTASSIUM  --   --   --   --   --   --   --  4.0  --   --  4.0  --  3.7   TSH 1.31  "0.38*   < >  --    < > 0.24*   < > 17.00*   < > 0.26* 0.31*   < > <0.02*    < > = values in this interval not displayed.      BP Readings from Last 3 Encounters:   02/18/20 122/80   10/17/19 132/86   09/18/19 120/78    Wt Readings from Last 3 Encounters:   02/18/20 105.5 kg (232 lb 9.6 oz)   10/17/19 99.8 kg (220 lb)   09/18/19 98.9 kg (218 lb)                      Reviewed and updated as needed this visit by Provider         Review of Systems   ROS COMP:   Rest of the ROS is Negative except see above and Problem list [stable]        Objective    /80   Pulse 106   Temp 97  F (36.1  C) (Oral)   Resp 16   Ht 1.664 m (5' 5.5\")   Wt 105.5 kg (232 lb 9.6 oz)   LMP 06/07/2018   SpO2 100%   Breastfeeding No   BMI 38.12 kg/m    Body mass index is 38.12 kg/m .  Physical Exam   GENERAL: healthy, alert and no distress  NECK: no adenopathy, no asymmetry, masses, or scars and thyroid normal to palpation  RESP: lungs clear to auscultation - no rales, rhonchi or wheezes  CV: regular rate and rhythm, normal S1 S2, no S3 or S4, no murmur, click or rub, no peripheral edema and peripheral pulses strong  ABDOMEN: soft, nontender, no hepatosplenomegaly, no masses and bowel sounds normal  MS: no gross musculoskeletal defects noted, no edema    Diagnostic Test Results:  Labs reviewed in Epic  Pending         Assessment & Plan     1. Hypothyroidism following radioiodine therapylabs pending   - TSH WITH FREE T4 REFLEX  - levothyroxine 125 MCG PO tablet; Take 1 tablet (125 mcg) by mouth daily Need to be seen in clinic for further refills  Dispense: 90 tablet; Refill: 3    2. History of Graves' disease      Return in about 1 month (around 3/18/2020) for Physical Exam.    Nahomi Castañeda MD  [facility]        "

## 2020-02-19 LAB — TSH SERPL DL<=0.005 MIU/L-ACNC: 0.48 MU/L (ref 0.4–4)

## 2020-03-19 ENCOUNTER — TELEPHONE (OUTPATIENT)
Dept: FAMILY MEDICINE | Facility: CLINIC | Age: 50
End: 2020-03-19

## 2020-03-19 NOTE — TELEPHONE ENCOUNTER
Reason for call:  Other   Patient called regarding (reason for call): note     Additional comments: Patient calling is stating that her work is closed and that her work wants her to move to another location and she doesn't feel comfortable going to a different environment. Asking for a note to just have her use her PTO instead of going to the next location. Please call to advise.     Phone number to reach patient:  Home number on file [phone] (home)    Best Time:  Any     Can we leave a detailed message on this number?  YES    Travel screening: Not Applicable

## 2020-04-04 ENCOUNTER — NURSE TRIAGE (OUTPATIENT)
Dept: NURSING | Facility: CLINIC | Age: 50
End: 2020-04-04

## 2020-04-04 ENCOUNTER — VIRTUAL VISIT (OUTPATIENT)
Dept: URGENT CARE | Facility: CLINIC | Age: 50
End: 2020-04-04
Payer: COMMERCIAL

## 2020-04-04 DIAGNOSIS — R07.89 CHEST WALL PAIN: Primary | ICD-10-CM

## 2020-04-04 PROCEDURE — 99207 ZZC NO BILLABLE SERVICE THIS VISIT: CPT | Performed by: STUDENT IN AN ORGANIZED HEALTH CARE EDUCATION/TRAINING PROGRAM

## 2020-04-04 NOTE — PROGRESS NOTES
[facility] Virtual Urgent Care Telephone Visit   (COVID-19 Triage Service)  CC: breast pain  ------------------------------------------------------------------------------------  History:  4 days of breast pain that came on gradually, not with a traumatic event. At work has been doing heavy lifting. Using ice packs and heat packs. Pain still there throughout the day and night. Started new job 2 weeks ago    Top part of left breast, up to under neck and toward shoulder. When pressing on it, does reproduce the pain. No rash or skin changes. Worse with palpation.    Normally exercise. Taking ibuprofen, Aleve and tylenol. Still feels the same pain.       Occupation: was doing vulnerable adults, now working at food shelf    Living situation:    In the 14 days before symptoms, close contact with a COVID-19 patient?     In the 14 days before symptoms, have you traveled internationally or to a state with high rates of COVID19?    Do you have a fever (exact temp)? no    Do you have a cough?  no    Headaches? no    Body aches? no     Are you having difficulty breathing? (mild = short of breath with walking, moderate=short of breath at rest, severe=can only speak in short phrases.) no trouble breathing    Chest pain? Breast pain    Able to lie flat? Able to lie flat, does not change  No history of heart or lung problems.    ------------------------------------------------------------------------------------  Objective    Exam Gen: Patient is alert, oriented  Exam Resp: Speaking in full sentences, no audible shortness of breath.  No cough or wheeze.  ------------------------------------------------------------------------------------  Review of Systems   ROS COMP: Constitutional, HEENT, cardiovascular, pulmonary, gi and gu systems are negative, except as otherwise noted.  Patient Active Problem List    Diagnosis Date Noted     History of Graves' disease 02/19/2019     Priority: Medium     Family history of colon cancer  "01/08/2019     Priority: Medium     Elevated blood pressure reading without diagnosis of hypertension 06/12/2018     Priority: Medium     Obesity      Priority: Medium     Hypothyroidism following radioiodine therapy 09/20/2012     Priority: Medium     CARDIOVASCULAR SCREENING; LDL GOAL LESS THAN 160 03/01/2012     Priority: Medium      Reviewed and updated as needed this visit by Provider    Current Outpatient Medications:      acetaminophen (TYLENOL) 650 MG CR tablet, Take 1 tablet (650 mg) by mouth every 8 hours as needed for pain, Disp: 60 tablet, Rfl: 1     amitriptyline (ELAVIL) 50 MG tablet, Take 1 tablet (50 mg) by mouth At Bedtime, Disp: 90 tablet, Rfl: 1     ferrous sulfate (IRON) 325 (65 Fe) MG tablet, Take by mouth daily (with breakfast), Disp: , Rfl:      levothyroxine 125 MCG PO tablet, Take 1 tablet (125 mcg) by mouth daily Need to be seen in clinic for further refills, Disp: 90 tablet, Rfl: 3     naproxen (NAPROSYN) 500 MG tablet, Take 1 tablet (500 mg) by mouth 2 times daily (with meals) Start after prednisone, Disp: 30 tablet, Rfl: 1     norethindrone-ethinyl estradiol (ORTHO-NOVUM 1-35 TAB,NORTREL 1-35 TAB) 1-35 MG-MCG per tablet, Take 1 tablet by mouth daily (Patient not taking: Reported on 1/8/2019), Disp: 84 tablet, Rfl: 4     Prenatal MV-Min-Fe Fum-FA-DHA (PRENATAL 1 PO), Take 1 tablet by mouth daily, Disp: , Rfl:    ------------------------------------------------------------------------------------  Assessment/Plan:  Most likely MSK-related pain of the R breast related to new heavy lifting at work. Reasurring that is reproducible with palpation, not worse with exertion, and no shortness of breath. Recommend 4 days cessation from heavy lifting.  - Ibuprofen 600mg q6hrs  - heat and cold packs    The patient has been notified of following: \"This telephone visit will be conducted via a call between you and your physician/provider. We have found that certain health care needs can be provided " "without the need for a physical exam.  This service lets us provide the care you need with a short phone conversation.  If a prescription is necessary we can send it directly to your pharmacy.  If lab work is needed we can place an order for that and you can then stop by our lab to have the test done at a later time. If during the course of the call the physician/provider feels a telephone visit is not appropriate, you will not be charged for this service.\"     Phone call duration:  30 minutes  Reynold Bearden MD, Internal Medicine PGY-2      Letter:  Liane S Shahid  [address]  [place] [address]      April 4, 2020        Dear Employer:    Liane Key was seen by our virtual urgent care triage at [facility]. She is currently having symptoms that appear to be a musculoskeletal injury, possibly due to heavy lifting. I have asked that she stop heavy lifting weights of more than 10 pounds for 4 days. If her work requires lifting more than 10 pounds, then she should refrain for work for 4 days. Thank you for your understanding.    Sincerely,  Dr. Reynold Bearden  [facility] Urgent Care Triage  "

## 2020-04-04 NOTE — TELEPHONE ENCOUNTER
I connected her with scheduling for an urgent care virtual visit. She has used both warm packs and cold packs to try and relieve the pain which is in the top of the right breast and shoots towards the left.  Meghann Vargas RN  [facility]      Reason for Disposition    [1Breast looks infected (spreading redness, feels hot or painful to touch) AND [2] no fever    Additional Information    Negative: Chest pain    Negative: Patient is breastfeeding    Negative: Postpartum breast pain and swelling, not breastfeeding    Negative: Small spot, skin growth or mole    Negative: [1] SEVERE breast pain AND [2] fever > 103 F (39.4 C)    Negative: Patient sounds very sick or weak to the triager    Negative: [1Breast looks infected (spreading redness, feels hot or painful to touch) AND [2] fever    Protocols used: BREAST SYMPTOMS-A-AH

## 2020-04-04 NOTE — LETTER
Liane Key  [address]  [place] [address]    April 4, 2020    Dear Employer:    Liane Key was seen by our virtual urgent care triage at [facility]. She is currently having symptoms that appear to be a musculoskeletal injury, possibly due to heavy lifting. I have asked that she stop heavy lifting weights of more than 10 pounds for 4 days. If her work requires lifting more than 10 pounds, then she should refrain for work for 4 days. Thank you for your understanding.    Sincerely,  Dr. Reynold Bearden  [facility] Urgent Care Triage

## 2020-04-04 NOTE — PROGRESS NOTES
I reviewed the telephone visit encounter and discussed the patient with the resident and agree with the resident s assessment and plan of care as documented.  Low suspicion for PE based on gradual onset, duration, and absence of dyspnea. Low suspicion for pneumonia without cough/fever. Low suspicion for ACS based on duration, gradual onset, non-exertion.    Aguila Hackett MD  04/04/20 2:49 PM

## 2020-04-04 NOTE — LETTER
Liane Key  [address]  [place] [address]      April 4, 2020        Dear Employer:    Liane Key was seen by our virtual urgent care triage at [facility]. She is currently having symptoms that appear to be a musculoskeletal injury, possibly due to heavy lifting. I have asked that she stop heavy lifting weights of more than 10 pounds for 4 days. If her work requires lifting more than 10 pounds, then she should refrain for work for 4 days. Thank you for your understanding.    Sincerely,  Dr. Reynold Bearden  [facility] Urgent Care Triage

## 2020-04-04 NOTE — PATIENT INSTRUCTIONS
Liane Key  [address]  [place] [address]      April 4, 2020        Dear Employer:    Liane Key was seen by our virtual urgent care triage at [facility]. She is currently having symptoms that appear to be a musculoskeletal injury, possibly due to heavy lifting. I have asked that she stop heavy lifting weights of more than 10 pounds for 4 days. If her work requires lifting more than 10 pounds, then she should refrain for work for 4 days. Thank you for your understanding.    Sincerely,  Dr. Reynold Bearden  [facility] Urgent Care Triage

## 2020-04-04 NOTE — PROGRESS NOTES
"Subjective     Liane Key is a 49 year old female who is being evaluated via a billable telephone visit.      The patient has been notified of following:     \"This telephone visit will be conducted via a call between you and your physician/provider. We have found that certain health care needs can be provided without the need for a physical exam.  This service lets us provide the care you need with a short phone conversation.  If a prescription is necessary we can send it directly to your pharmacy.  If lab work is needed we can place an order for that and you can then stop by our lab to have the test done at a later time.    If during the course of the call the physician/provider feels a telephone visit is not appropriate, you will not be charged for this service.\"     Patient has given verbal consent for Telephone visit?  Yes    Liane Key is a 50 yo female with PMH of Grave's disease,   ALLERGIES  Patient has no known allergies.    {SUPERLIST (Optional):329392}  {PEDS Chronic and Acute Problems (Optional):747513}     {additonal problems for provider to add (Optional):215538}    {HIST REVIEW/ LINKS 2 (Optional):149299}    Reviewed and updated as needed this visit by Provider         Review of Systems   {ROS COMP (Optional):826327}       Objective   Reported vitals:  LMP 06/07/2018    {GENERAL APPEARANCE:50::\"healthy\",\"alert\",\"no distress\"}  Psych: Alert and oriented times 3; coherent speech, normal   rate and volume, able to articulate logical thoughts, able   to abstract reason, no tangential thoughts, no hallucinations   or delusions  Her affect is ***     {Diagnostic Test Results (Optional):695277::\"Diagnostic Test Results:\",\"Labs reviewed in Epic\"}        Assessment/Plan:  {Diagnosis, Associated Orders and Comment:027450}    No follow-ups on file.      Phone call duration:  *** minutes    {signature options:302514}      "

## 2020-04-06 ENCOUNTER — TELEPHONE (OUTPATIENT)
Dept: FAMILY MEDICINE | Facility: CLINIC | Age: 50
End: 2020-04-06

## 2020-04-06 NOTE — TELEPHONE ENCOUNTER
Reason for call:  Other   Patient called regarding (reason for call): call back  Additional comments: Patient is calling to speak to a doctor about heat/cold flashes, and runny nose. Please call back to discuss.    Phone number to reach patient:  Home number on file [phone] (home)    Best Time:  any    Can we leave a detailed message on this number?  YES    Travel screening: Negative

## 2020-04-06 NOTE — TELEPHONE ENCOUNTER
Called patient. She states that she spoke with a provider on Saturday (virtual visit) because she was having some chest pains. States that she has been taking ibuprofen and tylenol. She got a note stating that she can't lift more than 10 lbs at work.  Today, she experienced lightheaded and her nose is running. Last night she was getting hot then cold. She went to [facility], they do not have a thermometer anywhere for her to take her temp, so she is not sure if she has a fever. 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 She is kind of nervous and wondering if she needs to be tested for coronavirus. Writer advised pt to go to [email or web address] to start a visit for screening for COVID-19, if any chest pain or SOB go to the ER.   Advised pt to give the pain in her shoulder some time, if no improvement f/u with PCP. Pt verbalized understanding and was in agreement with plan.

## 2020-05-15 DIAGNOSIS — F51.01 PRIMARY INSOMNIA: ICD-10-CM

## 2020-05-17 RX ORDER — AMITRIPTYLINE HYDROCHLORIDE 50 MG/1
50 TABLET ORAL AT BEDTIME
Qty: 90 TABLET | Refills: 0 | Status: SHIPPED | OUTPATIENT
Start: 2020-05-17 | End: 2020-09-11

## 2020-05-17 NOTE — TELEPHONE ENCOUNTER
Medication is being filled for 1 time refill only due to:  Patient needs to be seen because needs annual exam.   Deena Herrera RN

## 2020-08-10 ENCOUNTER — VIRTUAL VISIT (OUTPATIENT)
Dept: FAMILY MEDICINE | Facility: CLINIC | Age: 50
End: 2020-08-10
Payer: COMMERCIAL

## 2020-08-10 ENCOUNTER — NURSE TRIAGE (OUTPATIENT)
Dept: NURSING | Facility: CLINIC | Age: 50
End: 2020-08-10

## 2020-08-10 DIAGNOSIS — R05.9 COUGH: Primary | ICD-10-CM

## 2020-08-10 PROCEDURE — 99213 OFFICE O/P EST LOW 20 MIN: CPT | Mod: TEL | Performed by: NURSE PRACTITIONER

## 2020-08-10 NOTE — TELEPHONE ENCOUNTER
COVID 19 Nurse Triage Plan/Patient Instructions    Please be aware that novel coronavirus (COVID-19) may be circulating in the community. If you develop symptoms such as fever, cough, or SOB or if you have concerns about the presence of another infection including coronavirus (COVID-19), please contact your health care provider or visit [email or web address].     Disposition/Instructions    Virtual Visit with provider recommended. Reference Visit Selection Guide.    Thank you for taking steps to prevent the spread of this virus.  o Limit your contact with others.  o Wear a simple mask to cover your cough.  o Wash your hands well and often.    Resources    [facility]: About COVID-19: [email or web address]    CDC: What to Do If You're Sick: www.cdc.gov/coronavirus/2019-ncov/about/steps-when-sick.html    CDC: Ending Home Isolation: www.cdc.gov/coronavirus/2019-ncov/hcp/disposition-in-home-patients.html     CDC: Caring for Someone: www.cdc.gov/coronavirus/2019-ncov/if-you-are-sick/care-for-someone.html     [facility]: Interim Guidance for Hospital Discharge to Home: www.[email or web address]/hcp/[email or web address]    [facility] clinical trials (COVID-19 research studies): clinicalaffairs.[facility]-clinical-trials     Below are the COVID-19 hotlines at the [place] [facility] ([facility]). Interpreters are available.   o For health questions: Call 014-399-3316 or 1-864.832.9450 (7 a.m. to 7 p.m.)  o For questions about schools and childcare: Call 406-789-2416 or 1-433.240.5055 (7 a.m. to 7 p.m.)                     Reason for Disposition    MILD difficulty breathing (e.g., minimal/no SOB at rest, SOB with walking, pulse <100)    Additional Information    Negative: SEVERE difficulty breathing (e.g., struggling for each breath, speaks in single words)    Negative: Difficult to awaken or acting confused (e.g., disoriented, slurred speech)    Negative: Bluish (or gray) lips or face  now    Negative: Shock suspected (e.g., cold/pale/clammy skin, too weak to stand, low BP, rapid pulse)    Negative: Sounds like a life-threatening emergency to the triager    Negative: [1] COVID-19 exposure AND [2] no symptoms    Negative: COVID-19 and Breastfeeding, questions about    Negative: [1] Adult with possible COVID-19 symptoms AND [2] triager concerned about severity of symptoms or other causes    Negative: SEVERE or constant chest pain or pressure (Exception: mild central chest pain, present only when coughing)    Negative: MODERATE difficulty breathing (e.g., speaks in phrases, SOB even at rest, pulse 100-120)    Negative: Patient sounds very sick or weak to the triager    Protocols used: CORONAVIRUS (COVID-19) DIAGNOSED OR [id number] 5.16.20

## 2020-08-10 NOTE — PROGRESS NOTES
"Liane Key is a 50 year old female who is being evaluated via a billable telephone visit.      The patient has been notified of following:     \"This telephone visit will be conducted via a call between you and your physician/provider. We have found that certain health care needs can be provided without the need for a physical exam.  This service lets us provide the care you need with a short phone conversation.  If a prescription is necessary we can send it directly to your pharmacy.  If lab work is needed we can place an order for that and you can then stop by our lab to have the test done at a later time.    Telephone visits are billed at different rates depending on your insurance coverage. During this emergency period, for some insurers they may be billed the same as an in-person visit.  Please reach out to your insurance provider with any questions.    If during the course of the call the physician/provider feels a telephone visit is not appropriate, you will not be charged for this service.\"    Patient has given verbal consent for Telephone visit?  Yes    What phone number would you like to be contacted at? [phone]    How would you like to obtain your AVS? 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       Objective   Reported vitals:  [facility] 06/07/2018    alert and no distress  PSYCH: Alert and oriented times 3; coherent speech, normal   rate and volume, able to articulate logical thoughts, able   to abstract reason, no tangential thoughts, no hallucinations   or delusions  Her affect is normal  RESP: No cough, no audible wheezing, able to talk in full sentences  Remainder of exam unable to be completed due to telephone visits    Diagnostic Test Results:  Labs reviewed in Epic  none         Assessment/Plan:    1. Cough  Counseled on self-care measures including: cough drops, OTC acetaminophen PRN, hydration, rest, self quantine, masking, handwashing, and red flags of when to return including difficulty breathing.  - Symptomatic COVID-19 Virus (Coronavirus) by PCR; Future    Return in about 3 days (around 8/13/2020), or if symptoms worsen or fail to improve.    Phone call duration:  9 minutes    VAL Kim CNP

## 2020-08-10 NOTE — PATIENT INSTRUCTIONS
Your symptoms are consistent with a viral infection such as COVID-19. A COVID-19 test has been ordered. We will call you to set up that test.     How is COVID-19 treated?  At this time, there is no medicine to prevent or treat COVID-19. Experts are testing different medicines, trying to find one that works.  So far, the most proven treatment is to support your body while it fights the virus.  Get extra rest to help your body fight the illness.  Dring extra fluids (6 to 8 glasses of liquids each day), unless a doctor has told you not to. Water or Pedialyte are good. Avoid fluids that contain caffeine or alcohol.  Take over-the-counter (OTC) medicine to reduce pain and fever such as Acetaminophen.    If you are very sick, you may need to stay in the hospital. If you develop difficulty with breathing call 911 or go to the Emergency Room.  We may give you fluids through a vein to keep you hydrated (IV fluids).  To make sure your body gets enough oxygen, we may give you an oxygen mask or a breathing machine (ventilator).  We may turn you onto your stomach (called  prone positioning ). This will increase the oxygen in your lungs.    If you have symptoms, continue self-isolation.  You can stop self-isolation when ALL 3 of these are true:    You ve had no fever--and no medicine that reduces fever--for 3 full days (72 hours). And     Your symptoms are better, such as cough or trouble breathing. And     At least 10 days have passed since your symptoms first started.

## 2020-08-12 DIAGNOSIS — R05.9 COUGH: ICD-10-CM

## 2020-08-12 PROCEDURE — U0003 INFECTIOUS AGENT DETECTION BY NUCLEIC ACID (DNA OR RNA); SEVERE ACUTE RESPIRATORY SYNDROME CORONAVIRUS 2 (SARS-COV-2) (CORONAVIRUS DISEASE [COVID-19]), AMPLIFIED PROBE TECHNIQUE, MAKING USE OF HIGH THROUGHPUT TECHNOLOGIES AS DESCRIBED BY CMS-2020-01-R: HCPCS | Performed by: NURSE PRACTITIONER

## 2020-08-13 LAB
SARS-COV-2 RNA SPEC QL NAA+PROBE: ABNORMAL
SPECIMEN SOURCE: ABNORMAL

## 2020-08-14 ENCOUNTER — TELEPHONE (OUTPATIENT)
Dept: EMERGENCY MEDICINE | Facility: CLINIC | Age: 50
End: 2020-08-14

## 2020-08-14 NOTE — TELEPHONE ENCOUNTER
"Coronavirus (COVID-19) Notification    Caller Name (Patient, parent, daughter/son, grandparent, etc)  Patient    Reason for call  Notify of Positive Coronavirus (COVID-19) lab results, assess symptoms,  review [facility] recommendations    Lab Result    Lab test:  2019-nCoV rRt-PCR or SARS-CoV-2 PCR    Oropharyngeal AND/OR nasopharyngeal swabs is POSITIVE for 2019-nCoV RNA/SARS-COV-2 PCR (COVID-19 virus)    RN Recommendations/Instructions per [facility] Coronavirus COVID-19 recommendations    Brief introduction script  Introduce self then review script:  \"I am calling on behalf of [employer].  We were notified that your Coronavirus test (COVID-19) for was POSITIVE for the virus.  I have some information to relay to you but first I wanted to mention that the [place] Dept of Health will be contacting you shortly [it's possible MD already called Patient] to talk to you more about how you are feeling and other people you have had contact with who might now also have the virus.  Also, [facility] is Partnering with the [facility] for Covid-19 research, you may be contacted directly by research staff.\"    Assessment (Inquire about Patient's current symptoms)   Assessment   Current Symptoms at time of phone call: (if no symptoms, document No symptoms]  body ache, dizzy   Symptoms onset (if applicable)  8/5/20     If at time of call, Patients symptoms hare worsened, the Patient should contact 911 or have someone drive them to Emergency Dept promptly:      If Patient calling 911, inform 911 personal that you have tested positive for the Coronavirus (COVID-19).  Place mask on and await 911 to arrive.    If Emergency Dept, If possible, please have another adult drive you to the Emergency Dept but you need to wear mask when in contact with other people.      Review information with Patient    Your result was positive. This means you have COVID-19 (coronavirus).  We have sent you a letter that reviews " the information that I'll be reviewing with you now.    How can I protect others?    If you have symptoms: stay home and away from others (self-isolate) until:    You've had no fever--and no medicine that reduces fever--for 3 full days (72 hours). And      Your other symptoms have gotten better. For example, your cough or breathing has improved. And     At least 10 days have passed since your symptoms started.    If you don't have symptoms: Stay home and away from others (self-isolate) until at least 10 days have passed since your first positive COVID-19 test. (Date test collected)    During this time:    Stay in your own room, including for meals. Use your own bathroom if you can.    Stay away from others in your home. No hugging, kissing or shaking hands. No visitors.     Don't go to work, school or anywhere else.     Clean  high touch  surfaces often (doorknobs, counters, handles, etc.). Use a household cleaning spray or wipes. You'll find a full list on the EPA website at www.epa.gov/pesticide-registration/list-n-disinfectants-use-against-sars-cov-2.     Cover your mouth and nose with a mask, tissue or washcloth to avoid spreading germs.    Wash your hands and face often with soap and water.    Caregivers in these groups are at risk for severe illness due to COVID-19:  o People 65 years and older  o People who live in a nursing home or long-term care facility  o People with chronic disease (lung, heart, cancer, diabetes, kidney, liver, immunologic)  o People who have a weakened immune system, including those who:  - Are in cancer treatment  - Take medicine that weakens the immune system, such as corticosteroids  - Had a bone marrow or organ transplant  - Have an immune deficiency  - Have poorly controlled HIV or AIDS  - Are obese (body mass index of 40 or higher)  - Smoke regularly    Caregivers should wear gloves while washing dishes, handling laundry and cleaning bedrooms and bathrooms.    Wash and dry laundry  with special caution. Don't shake dirty laundry, and use the warmest water setting you can.    If you have a weakened immune system, ask your doctor about other actions you should take.    For more tips, go to www.cdc.gov/coronavirus/2019-ncov/downloads/10Things.pdf.    You should not go back to work until you meet the guidelines above for ending your home isolation. You should meet these along with any other guidelines that your employer has.    Employers: This document serves as formal notice of your employee's medical guidelines for going back to work. They must meet the above guidelines before going back to work in person.    How can I take care of myself?    1. Get lots of rest. Drink extra fluids (unless a doctor has told you not to).    2. Take Tylenol (acetaminophen) for fever or pain. If you have liver or kidney problems, ask your family doctor if it's okay to take Tylenol.     Take either:     650 mg (two 325 mg pills) every 4 to 6 hours, or     1,000 mg (two 500 mg pills) every 8 hours as needed.     Note: Don't take more than 3,000 mg in one day. Acetaminophen is found in many medicines (both prescribed and over-the-counter medicines). Read all labels to be sure you don't take too much.    For children, check the Tylenol bottle for the right dose (based on their age or weight).    3. If you have other health problems (like cancer, heart failure, an organ transplant or severe kidney disease): Call your specialty clinic if you don't feel better in the next 2 days.    4. Know when to call 911: Emergency warning signs include:    Trouble breathing or shortness of breath    Pain or pressure in the chest that doesn't go away    Feeling confused like you haven't felt before, or not being able to wake up    Bluish-colored lips or face    5. Sign up for [employer]. We know it's scary to hear that you have COVID-19. We want to track your symptoms to make sure you're okay over the next 2 weeks. Please look for an  email from [employer]--this is a free, online program that we'll use to keep in touch. To sign up, follow the link in the email. Learn more at [email or web address] Document [email or web address].    Where can I get more information?     [employer]: [email or web address]    Coronavirus Basics: www.health.[facility].[place]./diseases/coronavirus/basics.html    What to Do If You're Sick: www.cdc.gov/coronavirus/2019-ncov/about/steps-when-sick.html    Ending Home Isolation: www.cdc.gov/coronavirus/2019-ncov/hcp/disposition-in-home-patients.html     Caring for Someone with COVID-19: www.cdc.gov/coronavirus/2019-ncov/if-you-are-sick/care-for-someone.html     [facility] clinical trials (COVID-19 research studies): clinicalaffairs.[facility]-clinical-trials     A Positive COVID-19 letter will be sent via Lulu or the mail.    [Name]  Benjamin Henry RN  [facility]  COVID19 Results Team RN  Ph# [phone]

## 2020-09-10 DIAGNOSIS — F51.01 PRIMARY INSOMNIA: ICD-10-CM

## 2020-09-11 ENCOUNTER — OFFICE VISIT (OUTPATIENT)
Dept: FAMILY MEDICINE | Facility: CLINIC | Age: 50
End: 2020-09-11
Payer: COMMERCIAL

## 2020-09-11 ENCOUNTER — ANCILLARY PROCEDURE (OUTPATIENT)
Dept: GENERAL RADIOLOGY | Facility: CLINIC | Age: 50
End: 2020-09-11
Attending: FAMILY MEDICINE
Payer: COMMERCIAL

## 2020-09-11 VITALS
OXYGEN SATURATION: 100 % | BODY MASS INDEX: 36.87 KG/M2 | SYSTOLIC BLOOD PRESSURE: 120 MMHG | TEMPERATURE: 98.1 F | DIASTOLIC BLOOD PRESSURE: 72 MMHG | WEIGHT: 225 LBS | HEART RATE: 100 BPM

## 2020-09-11 DIAGNOSIS — M25.562 ACUTE PAIN OF LEFT KNEE: ICD-10-CM

## 2020-09-11 DIAGNOSIS — S89.92XA INJURY OF LEFT KNEE, INITIAL ENCOUNTER: ICD-10-CM

## 2020-09-11 DIAGNOSIS — S89.92XA INJURY OF LEFT KNEE, INITIAL ENCOUNTER: Primary | ICD-10-CM

## 2020-09-11 PROCEDURE — 73562 X-RAY EXAM OF KNEE 3: CPT | Mod: LT

## 2020-09-11 PROCEDURE — 99213 OFFICE O/P EST LOW 20 MIN: CPT | Performed by: FAMILY MEDICINE

## 2020-09-11 RX ORDER — NAPROXEN 500 MG/1
500 TABLET ORAL 2 TIMES DAILY WITH MEALS
Qty: 30 TABLET | Refills: 0 | Status: SHIPPED | OUTPATIENT
Start: 2020-09-11 | End: 2021-10-12

## 2020-09-11 NOTE — PROGRESS NOTES
Subjective     Liane Key is a 50 year old female who presents to clinic today for the following health issues:    HPI     Musculoskeletal problem/pain  Onset/Duration: 2 months  Description  Location: knee - left  Joint Swelling: YES  Redness: no  Pain: YES  Warmth: no  Intensity:  Mild moderate  Progression of Symptoms:  worsening  Accompanying signs and symptoms:   Fevers: no  Numbness/tingling/weakness: no  History  Trauma to the area: YES  Recent illness:  no  Previous similar problem: no  Previous evaluation:  no  Precipitating or alleviating factors:  Aggravating factors include: standing and climbing stairs  Therapies tried and outcome: all  Started about 2 month at work might have hit arm and left  Recent (2 weeks ago) was in kitchen; her dog was infront on her fell unto the the Lt elbow  Alsohas pain in the back of the knee  When standing up and going up and down stairs.    Review of Systems   Constitutional, HEENT, cardiovascular, pulmonary, gi and gu systems are negative, except as otherwise noted.      Objective    /72   Pulse 100   Temp 98.1  F (36.7  C) (Oral)   Wt 102.1 kg (225 lb)   LMP 06/07/2018   SpO2 100%   BMI 36.87 kg/m    Body mass index is 36.87 kg/m .  Physical Exam   GENERAL: healthy, alert and no distress  RESP: lungs clear to auscultation - no rales, rhonchi or wheezes  CV: regular rate and rhythm, normal S1 S2, no S3 or S4, no murmur, click or rub, no peripheral edema   ABDOMEN: soft, nontender, no masses and bowel sounds normal  MS: no gross musculoskeletal defects noted, no edema  PSYCH: mentation appears normal, affect normal/bright    Xray:   KNEE LEFT THREE VIEW   9/11/2020 10:42 AM   HISTORY:  Injury of left knee, initial encounter. Acute pain of left  knee.                                                              IMPRESSION: Small joint effusion. Otherwise unremarkable. No fracture  Identified.    Assessment & Plan     Liane was seen today for knee  pain.    Diagnoses and all orders for this visit:    Injury of left knee, initial encounter  -     Cancel: XR Knee Right 3 Views  -     XR Knee Left 3 Views; Future  -     naproxen (NAPROSYN) 500 MG tablet; Take 1 tablet (500 mg) by mouth 2 times daily (with meals)    Acute pain of left knee    Medial compartment space narrowing, NSAIDs  -     Cancel: XR Knee Right 3 Views  -     XR Knee Left 3 Views; Future  -     naproxen (NAPROSYN) 500 MG tablet; Take 1 tablet (500 mg) by mouth 2 times daily (with meals)      Return for follow up with results.    Joe Reyes MD  [facility]

## 2020-09-14 NOTE — TELEPHONE ENCOUNTER
Per notes, call patient between  or after 3.  Isabelle PEREZ CMA ([facility])

## 2020-09-16 NOTE — TELEPHONE ENCOUNTER
Spoke to patient and she stated she is taking medication. The dose is 50 mg and she takes it every night to help with sleeping. Sometimes she does take 100 mg at night.  Isabelle PEREZ CMA ([facility])

## 2020-09-17 RX ORDER — AMITRIPTYLINE HYDROCHLORIDE 50 MG/1
50 TABLET ORAL AT BEDTIME
Qty: 90 TABLET | Refills: 3 | Status: SHIPPED | OUTPATIENT
Start: 2020-09-17 | End: 2021-07-19

## 2020-11-22 ENCOUNTER — HEALTH MAINTENANCE LETTER (OUTPATIENT)
Age: 50
End: 2020-11-22

## 2021-02-13 ENCOUNTER — HEALTH MAINTENANCE LETTER (OUTPATIENT)
Age: 51
End: 2021-02-13

## 2021-04-16 ENCOUNTER — OFFICE VISIT (OUTPATIENT)
Dept: FAMILY MEDICINE | Facility: CLINIC | Age: 51
End: 2021-04-16
Payer: COMMERCIAL

## 2021-04-16 VITALS
OXYGEN SATURATION: 99 % | WEIGHT: 232.5 LBS | RESPIRATION RATE: 20 BRPM | SYSTOLIC BLOOD PRESSURE: 143 MMHG | TEMPERATURE: 98.2 F | DIASTOLIC BLOOD PRESSURE: 91 MMHG | HEART RATE: 98 BPM | HEIGHT: 68 IN | BODY MASS INDEX: 35.24 KG/M2

## 2021-04-16 DIAGNOSIS — Z12.11 COLON CANCER SCREENING: ICD-10-CM

## 2021-04-16 DIAGNOSIS — Z12.31 ENCOUNTER FOR SCREENING MAMMOGRAM FOR BREAST CANCER: ICD-10-CM

## 2021-04-16 DIAGNOSIS — E89.0 HYPOTHYROIDISM FOLLOWING RADIOIODINE THERAPY: Primary | ICD-10-CM

## 2021-04-16 PROCEDURE — 84443 ASSAY THYROID STIM HORMONE: CPT | Performed by: NURSE PRACTITIONER

## 2021-04-16 PROCEDURE — 99213 OFFICE O/P EST LOW 20 MIN: CPT | Performed by: NURSE PRACTITIONER

## 2021-04-16 PROCEDURE — 36415 COLL VENOUS BLD VENIPUNCTURE: CPT | Performed by: NURSE PRACTITIONER

## 2021-04-16 RX ORDER — LEVOTHYROXINE SODIUM 125 UG/1
125 TABLET ORAL DAILY
Qty: 90 TABLET | Refills: 3 | Status: SHIPPED | OUTPATIENT
Start: 2021-04-16 | End: 2021-12-22

## 2021-04-16 ASSESSMENT — PAIN SCALES - GENERAL: PAINLEVEL: NO PAIN (0)

## 2021-04-16 ASSESSMENT — MIFFLIN-ST. JEOR: SCORE: 1718.62

## 2021-04-16 NOTE — PROGRESS NOTES
"    {PROVIDER CHARTING PREFERENCE:097506}    Subjective   Liane is a 50 year old who presents for the following health issues {ACCOMPANIED BY STATEMENT (Optional):862305}    HPI   Hypothyroidism Follow-up    Since last visit, patient describes the following symptoms: { :977132::\"Weight stable, no hair loss, no skin changes, no constipation, no loose stools\"}      How many servings of fruits and vegetables do you eat daily?  { :497576}    On average, how many sweetened beverages do you drink each day (Examples: soda, juice, sweet tea, etc.  Do NOT count diet or artificially sweetened beverages)?   { 1-11:767020}    How many days per week do you exercise enough to make your heart beat faster? { :284193}    How many minutes a day do you exercise enough to make your heart beat faster? { :209597}    How many days per week do you miss taking your medication? {0-7 :800660}    {additonal problems for provider to add (Optional):867101}    Review of Systems   {ROS COMP (Optional):033070}      Objective    LMP 06/07/2018   There is no height or weight on file to calculate BMI.  Physical Exam   {Exam List (Optional):658624}    {Diagnostic Test Results (Optional):622941}    {AMBULATORY ATTESTATION (Optional):248698}        "

## 2021-04-16 NOTE — PROGRESS NOTES
Assessment & Plan     (E89.0) Hypothyroidism following radioiodine therapy  (primary encounter diagnosis)  Comment: Well controlled with medications without side effects.  Plan: TSH with free T4 reflex, levothyroxine         (SYNTHROID/LEVOTHROID) 125 MCG tablet          (Z12.31) Encounter for screening mammogram for breast cancer  Plan: *MA Screening Digital Bilateral          (Z12.11) Colon cancer screening  Plan: Fecal colorectal cancer screen (FIT)        Return in about 3 months (around 7/16/2021) for Physical with PCP.    VAL Kim [facility] JAVIER Rob is a 50 year old who presents for the following health issues     HPI     Hypertension Follow-up      Do you check your blood pressure regularly outside of the clinic? No     Are you following a low salt diet? No    Are your blood pressures ever more than 140 on the top number (systolic) OR more   than 90 on the bottom number (diastolic), for example 140/90? No. Unsure.     Hypothyroidism Follow-up      Since last visit, patient describes the following symptoms: Weight stable, no hair loss, no skin changes, no constipation, no loose stools per patient.      How many servings of fruits and vegetables do you eat daily?  4 or more    On average, how many sweetened beverages do you drink each day (Examples: soda, juice, sweet tea, etc.  Do NOT count diet or artificially sweetened beverages)?   0    How many days per week do you exercise enough to make your heart beat faster? 3 or less    How many minutes a day do you exercise enough to make your heart beat faster? 20 - 29    How many days per week do you miss taking your medication? 0      Review of Systems   Constitutional, HEENT, cardiovascular, pulmonary, gi and gu systems are negative, except as otherwise noted.      Objective    BP (!) 143/91 (BP Location: Right arm, Patient Position: Sitting, Cuff Size: Adult Large)   Pulse 98   Temp 98.2  F (36.8  " C) (Oral)   Resp 20   Ht 1.72 m (5' 7.72\")   Wt 105.5 kg (232 lb 8 oz)   LMP 06/07/2018   SpO2 99%   BMI 35.65 kg/m    Body mass index is 35.65 kg/m .  Physical Exam   GENERAL: healthy, alert and no distress  EYES: Eyes grossly normal to inspection, PERRL and conjunctivae and sclerae normal  RESP: lungs clear to auscultation - no rales, rhonchi or wheezes  CV: regular rate and rhythm, normal S1 S2, no S3 or S4, no murmur, click or rub, no peripheral edema and peripheral pulses strong  PSYCH: mentation appears normal, affect normal/bright          "

## 2021-04-19 LAB — TSH SERPL DL<=0.005 MIU/L-ACNC: 1.48 MU/L (ref 0.4–4)

## 2021-04-26 ENCOUNTER — ANCILLARY PROCEDURE (OUTPATIENT)
Dept: MAMMOGRAPHY | Facility: CLINIC | Age: 51
End: 2021-04-26
Attending: NURSE PRACTITIONER
Payer: COMMERCIAL

## 2021-04-26 DIAGNOSIS — Z12.31 ENCOUNTER FOR SCREENING MAMMOGRAM FOR BREAST CANCER: ICD-10-CM

## 2021-04-26 PROCEDURE — 77067 SCR MAMMO BI INCL CAD: CPT | Mod: TC | Performed by: RADIOLOGY

## 2021-06-10 ENCOUNTER — OFFICE VISIT (OUTPATIENT)
Dept: PODIATRY | Facility: CLINIC | Age: 51
End: 2021-06-10
Payer: COMMERCIAL

## 2021-06-10 VITALS
DIASTOLIC BLOOD PRESSURE: 66 MMHG | BODY MASS INDEX: 35.26 KG/M2 | HEART RATE: 98 BPM | WEIGHT: 230 LBS | SYSTOLIC BLOOD PRESSURE: 126 MMHG

## 2021-06-10 DIAGNOSIS — B35.9 TINEA: ICD-10-CM

## 2021-06-10 DIAGNOSIS — B35.1 ONYCHOMYCOSIS: Primary | ICD-10-CM

## 2021-06-10 PROBLEM — Z80.0 FAMILY HISTORY OF COLON CANCER: Status: RESOLVED | Noted: 2019-01-08 | Resolved: 2021-06-10

## 2021-06-10 LAB
ALBUMIN SERPL-MCNC: 3.7 G/DL (ref 3.4–5)
ALP SERPL-CCNC: 50 U/L (ref 40–150)
ALT SERPL W P-5'-P-CCNC: 19 U/L (ref 0–50)
AST SERPL W P-5'-P-CCNC: 16 U/L (ref 0–45)
BILIRUB DIRECT SERPL-MCNC: 0.2 MG/DL (ref 0–0.2)
BILIRUB SERPL-MCNC: 0.7 MG/DL (ref 0.2–1.3)
PROT SERPL-MCNC: 7.7 G/DL (ref 6.8–8.8)

## 2021-06-10 PROCEDURE — 36415 COLL VENOUS BLD VENIPUNCTURE: CPT | Performed by: PODIATRIST

## 2021-06-10 PROCEDURE — 99214 OFFICE O/P EST MOD 30 MIN: CPT | Performed by: PODIATRIST

## 2021-06-10 PROCEDURE — 80076 HEPATIC FUNCTION PANEL: CPT | Performed by: PODIATRIST

## 2021-06-10 RX ORDER — TERBINAFINE HYDROCHLORIDE 250 MG/1
250 TABLET ORAL DAILY
Qty: 30 TABLET | Refills: 2 | Status: SHIPPED | OUTPATIENT
Start: 2021-06-10 | End: 2021-10-12

## 2021-06-10 NOTE — PATIENT INSTRUCTIONS
We wish you continued good healing. If you have any questions or concerns, please do not hesitate to contact us at [phone]    [facility] [employer] (secure e-mail communication and access to your chart) to send a message or to make an appointment.    Please remember to call and schedule a follow up appointment if one was recommended at your earliest convenience.     +++OF MARCH 2020+++ LOCATION AND HOURS HAVE CHANGED    PLEASE CALL CLINICS TO VERIFY DAYS AND TIMES  PODIATRY CLINIC HOURS  TELEPHONE NUMBER    Dr. Ruben KEENPTERESA [facility]        Clinics:  [place]   Tuesday 1PM-6PM  [place]  Wednesday 745AM-330PM  [place]  Thursday/Friday 745AM-230PM  [place] [facility] APPOINTMENTS  [phone]    [place] APPOINTMENTS  [phone]          If you need a medication refill, please contact us you may need lab work and/or a follow up visit prior to your refill (i.e. Antifungal medications).    If MRI needed please call Imaging at [phone] or [phone]    HOW DO I GET MY KNEE SCOOTER? Knee scooters can be picked up at ANY Medical Supply stores with your knee scooter Prescription.  OR    Bring your signed prescription to an [facility] Medical Equipment showroom.

## 2021-06-10 NOTE — PROGRESS NOTES
Patient complains itching on both of her feet.  The right is worse.  She also complains of thick nails bilaterally.  Again the right is worse.  They have been slowly getting thicker.  They are harder to cut.  She is wondering how to resolve this.  She denies any erythema edema or drainage.  She is a non-smoker.  Only occasional alcohol use.    ROS: See above     No Known Allergies    Current Outpatient Medications   Medication Sig Dispense Refill     terbinafine (LAMISIL) 250 MG tablet Take 1 tablet (250 mg) by mouth daily 30 tablet 2     acetaminophen (TYLENOL) 650 MG CR tablet Take 1 tablet (650 mg) by mouth every 8 hours as needed for pain 60 tablet 1     amitriptyline (ELAVIL) 50 MG tablet Take 1 tablet (50 mg) by mouth At Bedtime 90 tablet 3     ferrous sulfate (IRON) 325 (65 Fe) MG tablet Take by mouth daily (with breakfast)       levothyroxine (SYNTHROID/LEVOTHROID) 125 MCG tablet Take 1 tablet (125 mcg) by mouth daily 90 tablet 3     naproxen (NAPROSYN) 500 MG tablet Take 1 tablet (500 mg) by mouth 2 times daily (with meals) 30 tablet 0     Prenatal MV-Min-Fe Fum-FA-DHA (PRENATAL 1 PO) Take 1 tablet by mouth daily         Patient Active Problem List   Diagnosis     CARDIOVASCULAR SCREENING; LDL GOAL LESS THAN 160     Hypothyroidism following radioiodine therapy     Obesity     Elevated blood pressure reading without diagnosis of hypertension     History of Graves' disease       Past Medical History:   Diagnosis Date     Graves disease      Hypothyroidism following radioiodine therapy      Obesity      Syphilis 5/8/2015       Past Surgical History:   Procedure Laterality Date     CHOLECYSTECTOMY  1998          HYSTERECTOMY, PAP NO LONGER INDICATED  07/13/2018     LAPAROSCOPIC ASSISTED HYSTERECTOMY VAGINAL  07/13/2018    adenomyosis, fibroids      OPEN REDUCTION INTERNAL FIXATION ANKLE Left 1990          TUBAL LIGATION         Family History   Problem Relation Age of Onset     Unknown/Adopted Maternal  Grandmother      Unknown/Adopted Maternal Grandfather      Unknown/Adopted Paternal Grandmother      Unknown/Adopted Paternal Grandfather      Depression Sister      Thyroid Disease Maternal Aunt      Cancer No family hx of      Diabetes No family hx of      Hypertension No family hx of      Cerebrovascular Disease No family hx of      Glaucoma No family hx of      Macular Degeneration No family hx of        Social History     Tobacco Use     Smoking status: Former Smoker     Packs/day: 0.20     Years: 5.00     Pack years: 1.00     Types: Cigarettes     Quit date: 2012     Years since quittin.0     Smokeless tobacco: Never Used   Substance Use Topics     Alcohol use: Yes     Comment: occasional          /66   Pulse 98   Wt 104.3 kg (230 lb)   LMP 2018   BMI 35.26 kg/m  .      VConstitutional/ general:  Pt is in no apparent distress, appears well-nourished.  Cooperative with history and physical exam.     Psych:  The patient answered questions appropriately.  Normal affect.  Seems to have reasonable expectations, in terms of treatment.    Eyes:  Visual scanning/ tracking without deficit.    Ears:  Response to auditory stimuli is normal.  .  Auricles in proper alignment.     Lymphatic:  Popliteal lymph nodes not enlarged.     Lungs:  Non labored breathing, non labored speech. No cough.  No audible wheezing. Even, quiet breathing.       Vascular:  Pedal pulses are palpable bilaterally for both the DP and PT arteries.  CFT < 3 sec.  No edema.  Pedal hair growth noted.     Neuro:  Alert and oriented x 3. Coordinated gait.  Light touch sensation is intact to the L4, L5, S1 distributions. No obvious deficits.  No evidence of neurological-based weakness, spasticity, or contracture in the lower extremities.    Derm: Normal texture and turgor.  No erythema, ecchymosis, or cyanosis.  No open lesions.  Bilateral toenails(s)  thickened, elongated, discolored, with subungual debris R>L.  Skin dry  erythematous and peeling and moccasin distribution bilaterally right more so than left.  No  edema, drainage, pain on palpation.  No signs of subungual masses or exostosis.    Musculoskeletal:    Lower extremity muscle strength is normal.  Patient is ambulatory without an assistive device or brace.  No gross deformities.  MS 5/5 all compartments.  Normal arch with weightbearing.         A/P  Onychomycosis          Chronic tinea    Discussed all options with patient for both problems.  Would like to try lamisil.  Risks, complications, and efficacy of going on this pill were discussed with the patient.  Will start lamisil 250mg, dispense 30, one per oral every day.  Two refils.  Will get LFTs today for baseline.  RTC in 3 months.    Ruben Casas DPM, FACFAS

## 2021-06-10 NOTE — LETTER
6/10/2021         RE: Liane Key  [address]  [place] [address]        Dear Colleague,    Thank you for referring your patient, Liane Key, to the [facility] [place]. Please see a copy of my visit note below.    Patient complains itching on both of her feet.  The right is worse.  She also complains of thick nails bilaterally.  Again the right is worse.  They have been slowly getting thicker.  They are harder to cut.  She is wondering how to resolve this.  She denies any erythema edema or drainage.  She is a non-smoker.  Only occasional alcohol use.    ROS: See above     No Known Allergies    Current Outpatient Medications   Medication Sig Dispense Refill     terbinafine (LAMISIL) 250 MG tablet Take 1 tablet (250 mg) by mouth daily 30 tablet 2     acetaminophen (TYLENOL) 650 MG CR tablet Take 1 tablet (650 mg) by mouth every 8 hours as needed for pain 60 tablet 1     amitriptyline (ELAVIL) 50 MG tablet Take 1 tablet (50 mg) by mouth At Bedtime 90 tablet 3     ferrous sulfate (IRON) 325 (65 Fe) MG tablet Take by mouth daily (with breakfast)       levothyroxine (SYNTHROID/LEVOTHROID) 125 MCG tablet Take 1 tablet (125 mcg) by mouth daily 90 tablet 3     naproxen (NAPROSYN) 500 MG tablet Take 1 tablet (500 mg) by mouth 2 times daily (with meals) 30 tablet 0     Prenatal MV-Min-Fe Fum-FA-DHA (PRENATAL 1 PO) Take 1 tablet by mouth daily         Patient Active Problem List   Diagnosis     CARDIOVASCULAR SCREENING; LDL GOAL LESS THAN 160     Hypothyroidism following radioiodine therapy     Obesity     Elevated blood pressure reading without diagnosis of hypertension     History of Graves' disease       Past Medical History:   Diagnosis Date     Graves disease      Hypothyroidism following radioiodine therapy      Obesity      Syphilis 5/8/2015       Past Surgical History:   Procedure Laterality Date     CHOLECYSTECTOMY  1998          HYSTERECTOMY, PAP NO LONGER INDICATED  07/13/2018      LAPAROSCOPIC ASSISTED HYSTERECTOMY VAGINAL  2018    adenomyosis, fibroids      OPEN REDUCTION INTERNAL FIXATION ANKLE Left           TUBAL LIGATION         Family History   Problem Relation Age of Onset     Unknown/Adopted Maternal Grandmother      Unknown/Adopted Maternal Grandfather      Unknown/Adopted Paternal Grandmother      Unknown/Adopted Paternal Grandfather      Depression Sister      Thyroid Disease Maternal Aunt      Cancer No family hx of      Diabetes No family hx of      Hypertension No family hx of      Cerebrovascular Disease No family hx of      Glaucoma No family hx of      Macular Degeneration No family hx of        Social History     Tobacco Use     Smoking status: Former Smoker     Packs/day: 0.20     Years: 5.00     Pack years: 1.00     Types: Cigarettes     Quit date: 2012     Years since quittin.0     Smokeless tobacco: Never Used   Substance Use Topics     Alcohol use: Yes     Comment: occasional          /66   Pulse 98   Wt 104.3 kg (230 lb)   LMP 2018   BMI 35.26 kg/m  .      VConstitutional/ general:  Pt is in no apparent distress, appears well-nourished.  Cooperative with history and physical exam.     Psych:  The patient answered questions appropriately.  Normal affect.  Seems to have reasonable expectations, in terms of treatment.    Eyes:  Visual scanning/ tracking without deficit.    Ears:  Response to auditory stimuli is normal.  .  Auricles in proper alignment.     Lymphatic:  Popliteal lymph nodes not enlarged.     Lungs:  Non labored breathing, non labored speech. No cough.  No audible wheezing. Even, quiet breathing.       Vascular:  Pedal pulses are palpable bilaterally for both the DP and PT arteries.  CFT < 3 sec.  No edema.  Pedal hair growth noted.     Neuro:  Alert and oriented x 3. Coordinated gait.  Light touch sensation is intact to the L4, L5, S1 distributions. No obvious deficits.  No evidence of neurological-based weakness,  spasticity, or contracture in the lower extremities.    Derm: Normal texture and turgor.  No erythema, ecchymosis, or cyanosis.  No open lesions.  Bilateral toenails(s)  thickened, elongated, discolored, with subungual debris R>L.  Skin dry erythematous and peeling and moccasin distribution bilaterally right more so than left.  No  edema, drainage, pain on palpation.  No signs of subungual masses or exostosis.    Musculoskeletal:    Lower extremity muscle strength is normal.  Patient is ambulatory without an assistive device or brace.  No gross deformities.  MS 5/5 all compartments.  Normal arch with weightbearing.         A/P  Onychomycosis          Chronic tinea    Discussed all options with patient for both problems.  Would like to try lamisil.  Risks, complications, and efficacy of going on this pill were discussed with the patient.  Will start lamisil 250mg, dispense 30, one per oral every day.  Two refils.  Will get LFTs today for baseline.  RTC in 3 months.    Ruben Casas DPM, FACFAS                      Again, thank you for allowing me to participate in the care of your patient.        Sincerely,        Ruben Casas DPM

## 2021-07-19 DIAGNOSIS — F51.01 PRIMARY INSOMNIA: ICD-10-CM

## 2021-07-19 RX ORDER — AMITRIPTYLINE HYDROCHLORIDE 50 MG/1
50 TABLET ORAL AT BEDTIME
Qty: 90 TABLET | Refills: 0 | Status: SHIPPED | OUTPATIENT
Start: 2021-07-19 | End: 2022-08-11

## 2021-07-19 NOTE — TELEPHONE ENCOUNTER
Medication is being filled for 1 time refill only due to:  Patient needs to be seen because NEED ANNUAL EXAM.

## 2021-08-02 ENCOUNTER — OFFICE VISIT (OUTPATIENT)
Dept: FAMILY MEDICINE | Facility: CLINIC | Age: 51
End: 2021-08-02
Payer: COMMERCIAL

## 2021-08-02 VITALS
WEIGHT: 222.05 LBS | TEMPERATURE: 98.1 F | OXYGEN SATURATION: 100 % | SYSTOLIC BLOOD PRESSURE: 134 MMHG | DIASTOLIC BLOOD PRESSURE: 89 MMHG | HEART RATE: 93 BPM | BODY MASS INDEX: 34.05 KG/M2

## 2021-08-02 DIAGNOSIS — L81.9 HYPOPIGMENTATION: Primary | ICD-10-CM

## 2021-08-02 PROCEDURE — 99213 OFFICE O/P EST LOW 20 MIN: CPT | Performed by: PHYSICIAN ASSISTANT

## 2021-08-02 RX ORDER — KETOCONAZOLE 20 MG/ML
SHAMPOO TOPICAL DAILY PRN
Qty: 120 ML | Refills: 0 | Status: SHIPPED | OUTPATIENT
Start: 2021-08-02 | End: 2021-10-12

## 2021-08-02 ASSESSMENT — PAIN SCALES - GENERAL: PAINLEVEL: NO PAIN (0)

## 2021-08-02 NOTE — PROGRESS NOTES
"    Assessment & Plan     Hypopigmentation  With several areas of hypopigmentation, suggest initially trying ketoconazole wash for tinea versicolor. Suggest following up with dermatology if not noting improvement. I discussed with the patient risks and benefits of the new medication prescribed including potential side effects.  The patient had opportunity to ask questions and is comfortable with and interested in medications as prescribed.   - ketoconazole (NIZORAL) 2 % external shampoo; Apply topically daily as needed for itching or irritation  - Adult Dermatology Referral; Future      BMI:   Estimated body mass index is 34.05 kg/m  as calculated from the following:    Height as of 4/16/21: 1.72 m (5' 7.72\").    Weight as of this encounter: 100.7 kg (222 lb 0.8 oz).   Weight management plan: Discussed healthy diet and exercise guidelines        Return in about 3 months (around 11/2/2021) for Routine preventive.    Marcelina Owens PA-C  [facility] [place] is a 51 year old who presents for the following health issues     HPI     Concern - Derm  Onset: Over 1 year ago  Description: Small white pigments all over body  Progression of Symptoms:  Spreading  Accompanying Signs & Symptoms: None  Previous history of similar problem: None  Precipitating factors:        Worsened by: None  Alleviating factors:        Improved by: None  Therapies tried and outcome: Normal daily lotion        Review of Systems   Constitutional, HEENT, cardiovascular, pulmonary, gi and gu systems are negative, except as otherwise noted.      Objective    /89 (BP Location: Right arm, Patient Position: Chair, Cuff Size: Adult Large)   Pulse 93   Temp 98.1  F (36.7  C) (Oral)   Wt 100.7 kg (222 lb 0.8 oz)   LMP 06/07/2018   SpO2 100%   BMI 34.05 kg/m    Body mass index is 34.05 kg/m .  Physical Exam   GENERAL: healthy, alert and no distress  NECK: no adenopathy, no asymmetry, masses, or scars and " thyroid normal to palpation  RESP: lungs clear to auscultation - no rales, rhonchi or wheezes  CV: regular rate and rhythm, normal S1 S2, no S3 or S4, no murmur, click or rub, no peripheral edema and peripheral pulses strong  ABDOMEN: soft, nontender, no hepatosplenomegaly, no masses and bowel sounds normal  MS: no gross musculoskeletal defects noted, no edema  SKIN: several 4-7 mm hypopigmented macules scattered on legs, breasts, back. Non erythematous.

## 2021-08-05 ENCOUNTER — APPOINTMENT (OUTPATIENT)
Dept: LAB | Facility: CLINIC | Age: 51
End: 2021-08-05
Payer: COMMERCIAL

## 2021-08-09 ENCOUNTER — LAB (OUTPATIENT)
Dept: LAB | Facility: CLINIC | Age: 51
End: 2021-08-09
Payer: COMMERCIAL

## 2021-08-09 DIAGNOSIS — Z12.11 COLON CANCER SCREENING: ICD-10-CM

## 2021-08-09 LAB — HEMOCCULT STL QL IA: NEGATIVE

## 2021-08-09 PROCEDURE — 82274 ASSAY TEST FOR BLOOD FECAL: CPT

## 2021-09-19 ENCOUNTER — HEALTH MAINTENANCE LETTER (OUTPATIENT)
Age: 51
End: 2021-09-19

## 2021-10-11 ENCOUNTER — NURSE TRIAGE (OUTPATIENT)
Dept: FAMILY MEDICINE | Facility: CLINIC | Age: 51
End: 2021-10-11

## 2021-10-11 NOTE — TELEPHONE ENCOUNTER
Reason for Call:  Same Day Appointment, Requested Provider:  any    PCP: Nahomi Castañeda    Reason for visit: lower back pain x a week std test    Duration of symptoms:     Have you been treated for this in the past? No    Additional comments: patient wondering if she could be worked [employer].    Can we leave a detailed message on this number? YES    Phone number patient can be reached at: Home number on file [phone] (home)    Best Time: any    Call taken on 10/11/2021 at 8:01 AM by Mary Ann Jorgensen

## 2021-10-11 NOTE — TELEPHONE ENCOUNTER
Reached out to patient to inquire about request for same-day appointment. She states that she has been experiencing low back pain for > 1 week. She was triaged- see below. Patient advised to be seen today or tomorrow in clinic. She stated she would not like to be seen in urgent care, however would like to schedule an appointment for tomorrow. Scheduled for appt with Dr. Killian [id number] and advised to be seen sooner if pain worsens.    Reason for Disposition    Age > 50 and no history of prior similar back pain    Additional Information    Negative: Passed out (i.e., fainted, collapsed and was not responding)    Negative: Shock suspected (e.g., cold/pale/clammy skin, too weak to stand, low BP, rapid pulse)    Negative: Sounds like a life-threatening emergency to the triager    Negative: Major injury to the back (e.g., MVA, fall > 10 feet or 3 meters, penetrating injury, etc.)    Negative: Pain in the upper back over the ribs (rib cage) that radiates (travels) into the chest    Negative: Pain in the upper back over the ribs (rib cage) and worsened by coughing (or clearly increases with breathing)    Negative: SEVERE back pain of sudden onset and age > 60    Negative: SEVERE abdominal pain (e.g., excruciating)    Negative: Abdominal pain and age > 60    Negative: Unable to urinate (or only a few drops) and bladder feels very full    Negative: Loss of bladder or bowel control (urine or bowel incontinence; wetting self, leaking stool) of new onset    Negative: Numbness (loss of sensation) in groin or rectal area    Negative: Pain radiates into groin, scrotum    Negative: Blood in urine (red, pink, or tea-colored)    Negative: Vomiting and pain over lower ribs of back (i.e., flank - kidney area)    Negative: Weakness of a leg or foot (e.g., unable to bear weight, dragging foot)    Negative: Patient sounds very sick or weak to the triager    Negative: Fever > 100.4 F (38.0 C) and flank pain    Negative: Pain or burning with  "passing urine (urination)    Negative: SEVERE back pain (e.g., excruciating, unable to do any normal activities) and not improved after pain medicine and CARE ADVICE    Negative: Numbness in an arm or hand (i.e., loss of sensation) and upper back pain    Negative: Numbness in a leg or foot (i.e., loss of sensation)    Negative: High-risk adult (e.g., history of cancer, history of HIV, or history of IV drug abuse)    Negative: Painful rash with multiple small blisters grouped together (i.e., dermatomal distribution or 'band' or 'stripe')    Negative: Pain radiates into the thigh or further down the leg, and in both legs    Answer Assessment - Initial Assessment Questions  1. ONSET: \"When did the pain begin?\"       About a week ago  2. LOCATION: \"Where does it hurt?\" (upper, mid or lower back)      Lower back  3. SEVERITY: \"How bad is the pain?\"  (e.g., Scale 1-10; mild, moderate, or severe)    - MILD (1-3): doesn't interfere with normal activities     - MODERATE (4-7): interferes with normal activities or awakens from sleep     - SEVERE (8-10): excruciating pain, unable to do any normal activities       10/10 when it happens   4. PATTERN: \"Is the pain constant?\" (e.g., yes, no; constant, intermittent)       Constant, worse with position changing  5. RADIATION: \"Does the pain shoot into your legs or elsewhere?\"      No, localized in lower back   6. CAUSE:  \"What do you think is causing the back pain?\"       Unsure  7. BACK OVERUSE:  \"Any recent lifting of heavy objects, strenuous work or exercise?\"      Haven't done any heavy lifting, but has bent over recently and the pain started. Was not lifting something - just normal bending  8. MEDICATIONS: \"What have you taken so far for the pain?\" (e.g., nothing, acetaminophen, NSAIDS)      's muscle relaxer pains, did not help. Ibuprofen does not help.  9. NEUROLOGIC SYMPTOMS: \"Do you have any weakness, numbness, or problems with bowel/bladder control?\"      No  10. " "OTHER SYMPTOMS: \"Do you have any other symptoms?\" (e.g., fever, abdominal pain, burning with urination, blood in urine)        None  11. PREGNANCY: \"Is there any chance you are pregnant?\" (e.g., yes, no; LMP)        N/A    Protocols used: BACK PAIN-A-OH    GRICEL MaldonadoN AWILDA  [facility], [place]  "

## 2021-10-12 ENCOUNTER — OFFICE VISIT (OUTPATIENT)
Dept: FAMILY MEDICINE | Facility: CLINIC | Age: 51
End: 2021-10-12
Payer: COMMERCIAL

## 2021-10-12 VITALS
DIASTOLIC BLOOD PRESSURE: 77 MMHG | OXYGEN SATURATION: 98 % | WEIGHT: 221 LBS | TEMPERATURE: 98.1 F | BODY MASS INDEX: 33.49 KG/M2 | HEART RATE: 85 BPM | HEIGHT: 68 IN | SYSTOLIC BLOOD PRESSURE: 127 MMHG

## 2021-10-12 DIAGNOSIS — S39.012A STRAIN OF LUMBAR REGION, INITIAL ENCOUNTER: Primary | ICD-10-CM

## 2021-10-12 PROCEDURE — 99214 OFFICE O/P EST MOD 30 MIN: CPT | Performed by: FAMILY MEDICINE

## 2021-10-12 RX ORDER — NAPROXEN 500 MG/1
500 TABLET ORAL 2 TIMES DAILY WITH MEALS
Qty: 30 TABLET | Refills: 0 | Status: SHIPPED | OUTPATIENT
Start: 2021-10-12 | End: 2022-06-01

## 2021-10-12 RX ORDER — PREDNISONE 20 MG/1
20 TABLET ORAL DAILY
Qty: 5 TABLET | Refills: 0 | Status: SHIPPED | OUTPATIENT
Start: 2021-10-12 | End: 2022-04-21

## 2021-10-12 ASSESSMENT — MIFFLIN-ST. JEOR: SCORE: 1661.98

## 2021-10-12 NOTE — PATIENT INSTRUCTIONS
Patient Education     Understanding Lumbosacral Strain    Lumbosacral strain is a medical term for an injury that causes low back pain. The lumbosacral area (low back) is between the bottom of the ribcage and the top of the buttocks. A strain is tearing of muscles and tendons. These tears can be very small but still cause pain.   How a lumbosacral strain happens  Muscles and tendons connected to the spine can be strained in a number of ways:    Sitting or standing in the same position for long periods of time. This can harm the low back over time. Poor posture can make low back pain more likely.    Moving the muscles and tendons past their usual range of motion. This can cause a sudden injury. This can happen when you twist, bend over, or lift something heavy. Not using correct technique for sports or tasks like lifting can make back injury more likely.    Accidents or falls  Lumbosacral strain can be caused by other problems, but these are less common.  Symptoms of lumbosacral strain  Symptoms may include:    Pain in the back, often on one side    Pain that gets worse with movement and gets better with rest    Inability to move as freely as usual    Swelling, slight redness, and skin warmth in the painful area  Treatment for lumbosacral strain  Low back pain often goes away by itself within several weeks. But it often comes back. Treatment focuses on reducing pain and avoiding further injury. Bed rest is usually not recommended for low back pain. Treatments may include:     Avoiding or changing the action that caused the problem. This helps prevent injuring the tissues again.    Prescription or over-the-counter medicines. These help reduce inflammation, swelling, and pain. NSAIDs (nonsteroidal anti-inflammatory drugs) are the most common medicines used. Medicines may be prescribed or bought over the counter. They may be given as pills. Or they may be put on the skin a gel, cream, or patch.    Cold or heat packs.  These help reduce pain and swelling.    Stretching and other exercises.  These improve flexibility and strength.    Physical therapy. This usually includes exercises and other treatments.    Injections of medicine. This may relieve symptoms. The medicine is usually a corticosteroid. This is a strong anti-inflammatory medicine.  If these treatments don't relieve symptoms, your healthcare provider may order imaging tests to learn more about the problem. Sometimes you may need surgery.   Possible complications of lumbosacral strain  If the cause of the pain is not addressed, symptoms may return or get worse. Follow your healthcare provider s instructions on lifestyle changes and treating your back.   When to call your healthcare provider  Call your healthcare provider right away if you have any of these:    Fever of 100.4 F (38 C) or higher, or as directed by your rrovider    Chills    Numbness, tingling, or weakness    Problems with bowel or bladder control, or problems having sex    Pain that does not go away, or gets worse    New symptoms  [employer] last reviewed this educational content on 6/1/2019 2000-2021 [employer]. All rights reserved. This information is not intended as a substitute for professional medical care. Always follow your healthcare professional's instructions.

## 2021-10-12 NOTE — PROGRESS NOTES
Assessment & Plan       ICD-10-CM    1. Strain of lumbar region, initial encounter  S39.012A REVIEW OF HEALTH MAINTENANCE PROTOCOL ORDERS     [facility] Referral     predniSONE (DELTASONE) 20 MG tablet     naproxen (NAPROSYN) 500 MG tablet     tiZANidine (ZANAFLEX) 4 MG tablet         Review of external notes as documented elsewhere in note         Patient Instructions     Patient Education     Understanding Lumbosacral Strain    Lumbosacral strain is a medical term for an injury that causes low back pain. The lumbosacral area (low back) is between the bottom of the ribcage and the top of the buttocks. A strain is tearing of muscles and tendons. These tears can be very small but still cause pain.   How a lumbosacral strain happens  Muscles and tendons connected to the spine can be strained in a number of ways:    Sitting or standing in the same position for long periods of time. This can harm the low back over time. Poor posture can make low back pain more likely.    Moving the muscles and tendons past their usual range of motion. This can cause a sudden injury. This can happen when you twist, bend over, or lift something heavy. Not using correct technique for sports or tasks like lifting can make back injury more likely.    Accidents or falls  Lumbosacral strain can be caused by other problems, but these are less common.  Symptoms of lumbosacral strain  Symptoms may include:    Pain in the back, often on one side    Pain that gets worse with movement and gets better with rest    Inability to move as freely as usual    Swelling, slight redness, and skin warmth in the painful area  Treatment for lumbosacral strain  Low back pain often goes away by itself within several weeks. But it often comes back. Treatment focuses on reducing pain and avoiding further injury. Bed rest is usually not recommended for low back pain. Treatments may include:     Avoiding or changing the action that caused the problem. This  helps prevent injuring the tissues again.    Prescription or over-the-counter medicines. These help reduce inflammation, swelling, and pain. NSAIDs (nonsteroidal anti-inflammatory drugs) are the most common medicines used. Medicines may be prescribed or bought over the counter. They may be given as pills. Or they may be put on the skin a gel, cream, or patch.    Cold or heat packs. These help reduce pain and swelling.    Stretching and other exercises.  These improve flexibility and strength.    Physical therapy. This usually includes exercises and other treatments.    Injections of medicine. This may relieve symptoms. The medicine is usually a corticosteroid. This is a strong anti-inflammatory medicine.  If these treatments don't relieve symptoms, your healthcare provider may order imaging tests to learn more about the problem. Sometimes you may need surgery.   Possible complications of lumbosacral strain  If the cause of the pain is not addressed, symptoms may return or get worse. Follow your healthcare provider s instructions on lifestyle changes and treating your back.   When to call your healthcare provider  Call your healthcare provider right away if you have any of these:    Fever of 100.4 F (38 C) or higher, or as directed by your rrovider    Chills    Numbness, tingling, or weakness    Problems with bowel or bladder control, or problems having sex    Pain that does not go away, or gets worse    New symptoms  [email or web address] last reviewed this educational content on 6/1/2019 2000-2021 [employer]. All rights reserved. This information is not intended as a substitute for professional medical care. Always follow your healthcare professional's instructions.               No follow-ups on file.    Joseph Killian MD  [facility] JAVIER Rob is a 51 year old who presents for the following health issues     HPI     Back Pain  Onset/Duration: 1 week  Description:  "  Location of pain: low back bilateral  Character of pain: sharp  Pain radiation: none  New numbness or weakness in legs, not attributed to pain: no   Intensity: Currently 5/10  Progression of Symptoms: same  History:   Specific cause: turning/bending  Pain interferes with job: YES- Sometimes  History of back problems: no prior back problems  Any previous MRI or X-rays: None  Sees a specialist for back pain: No  Alleviating factors:   Improved by: muscle relaxants    Precipitating factors:  Worsened by: Changing postions  Therapies tried and outcome: heat, muscle relaxants and NSAIDs    Accompanying Signs & Symptoms:  Risk of Fracture: None  Risk of Cauda Equina: None  Risk of Infection: None  Risk of Cancer: None  Risk of Ankylosing Spondylitis: Onset at age <35, male, AND morning back stiffness  no               Pain lower back  X 1 week  Bent down to pick something up then started hurting  No radiation  Ibuprofen, muscle relaxor        Review of Systems   Constitutional, HEENT, cardiovascular, pulmonary, gi and gu systems are negative, except as otherwise noted.      Objective    /77   Pulse 85   Temp 98.1  F (36.7  C) (Oral)   Ht 1.721 m (5' 7.75\")   Wt 100.2 kg (221 lb)   LMP 06/07/2018   SpO2 98%   Breastfeeding No   BMI 33.85 kg/m    Body mass index is 33.85 kg/m .  Physical Exam   GENERAL: healthy, alert and no distress  EYES: Eyes grossly normal to inspection, PERRL and conjunctivae and sclerae normal  NECK: no adenopathy, no asymmetry, masses, or scars and thyroid normal to palpation  MS: lumbar paraspinal tenderness  SKIN: no suspicious lesions or rashes  NEURO: Normal strength and tone, mentation intact and speech normal  PSYCH: mentation appears normal, affect normal/bright              "

## 2021-10-18 ENCOUNTER — OFFICE VISIT (OUTPATIENT)
Dept: FAMILY MEDICINE | Facility: CLINIC | Age: 51
End: 2021-10-18
Payer: COMMERCIAL

## 2021-10-18 VITALS
WEIGHT: 218 LBS | DIASTOLIC BLOOD PRESSURE: 87 MMHG | HEIGHT: 68 IN | SYSTOLIC BLOOD PRESSURE: 137 MMHG | RESPIRATION RATE: 18 BRPM | BODY MASS INDEX: 33.04 KG/M2 | HEART RATE: 79 BPM | OXYGEN SATURATION: 100 % | TEMPERATURE: 97.7 F

## 2021-10-18 DIAGNOSIS — E89.0 HYPOTHYROIDISM FOLLOWING RADIOIODINE THERAPY: ICD-10-CM

## 2021-10-18 DIAGNOSIS — Z11.3 ROUTINE SCREENING FOR STI (SEXUALLY TRANSMITTED INFECTION): Primary | ICD-10-CM

## 2021-10-18 LAB
CLUE CELLS: ABNORMAL
T4 FREE SERPL-MCNC: 1.23 NG/DL (ref 0.76–1.46)
TRICHOMONAS, WET PREP: ABNORMAL
TSH SERPL DL<=0.005 MIU/L-ACNC: 0.14 MU/L (ref 0.4–4)
WBC'S/HIGH POWER FIELD, WET PREP: ABNORMAL
YEAST, WET PREP: ABNORMAL

## 2021-10-18 PROCEDURE — 87389 HIV-1 AG W/HIV-1&-2 AB AG IA: CPT | Performed by: INTERNAL MEDICINE

## 2021-10-18 PROCEDURE — 87210 SMEAR WET MOUNT SALINE/INK: CPT | Performed by: INTERNAL MEDICINE

## 2021-10-18 PROCEDURE — 87591 N.GONORRHOEAE DNA AMP PROB: CPT | Performed by: INTERNAL MEDICINE

## 2021-10-18 PROCEDURE — 99000 SPECIMEN HANDLING OFFICE-LAB: CPT | Performed by: INTERNAL MEDICINE

## 2021-10-18 PROCEDURE — 99213 OFFICE O/P EST LOW 20 MIN: CPT | Performed by: INTERNAL MEDICINE

## 2021-10-18 PROCEDURE — 86803 HEPATITIS C AB TEST: CPT | Performed by: INTERNAL MEDICINE

## 2021-10-18 PROCEDURE — 87491 CHLMYD TRACH DNA AMP PROBE: CPT | Performed by: INTERNAL MEDICINE

## 2021-10-18 PROCEDURE — 86780 TREPONEMA PALLIDUM: CPT | Mod: 90 | Performed by: INTERNAL MEDICINE

## 2021-10-18 PROCEDURE — 86780 TREPONEMA PALLIDUM: CPT | Performed by: INTERNAL MEDICINE

## 2021-10-18 PROCEDURE — 84443 ASSAY THYROID STIM HORMONE: CPT | Performed by: INTERNAL MEDICINE

## 2021-10-18 PROCEDURE — 84439 ASSAY OF FREE THYROXINE: CPT | Performed by: INTERNAL MEDICINE

## 2021-10-18 PROCEDURE — 86592 SYPHILIS TEST NON-TREP QUAL: CPT | Performed by: INTERNAL MEDICINE

## 2021-10-18 PROCEDURE — 36415 COLL VENOUS BLD VENIPUNCTURE: CPT | Performed by: INTERNAL MEDICINE

## 2021-10-18 ASSESSMENT — MIFFLIN-ST. JEOR: SCORE: 1648.37

## 2021-10-18 ASSESSMENT — PAIN SCALES - GENERAL: PAINLEVEL: NO PAIN (0)

## 2021-10-18 NOTE — PROGRESS NOTES
Assessment & Plan   Problem List Items Addressed This Visit     Hypothyroidism following radioiodine therapy    Relevant Orders    OPTOMETRY REFERRAL    TSH with free T4 reflex (Completed)    T4 free (Completed)      Other Visit Diagnoses     Routine screening for STI (sexually transmitted infection)    -  Primary    Relevant Orders    HIV Antigen Antibody Combo (Completed)    Treponema Abs w Reflex to RPR and Titer (Completed)    Hepatitis C Screen Reflex to HCV RNA Quant and Genotype (Completed)    NEISSERIA GONORRHOEA PCR (Completed)    CHLAMYDIA TRACHOMATIS PCR (Completed)    Wet prep - lab collect (Completed)    Rapid Plasma Reagin w Rflx to TITER (Completed)    Treponema pallidum antibody confirm                  Work on weight loss  Regular exercise    No follow-ups on file.    Ruben Dow MD  [facility] JAVIER Rob is a 51 year old who presents for the following health issues     HPI       Concern - STD testing   Description: She is requesting STD testing; o possible exposure from a partner. She denies any vaginal concerns.   Treated trichmonas          last year last         Review of Systems   Constitutional, HEENT, cardiovascular, pulmonary, gi and gu systems are negative, except as otherwise noted.      Objective    /87   Pulse 79   Temp 97.7  F (36.5  C) (Oral)   Resp 18   Wt 98.9 kg (218 lb)   LMP 06/07/2018   SpO2 100%   BMI 33.39 kg/m    Body mass index is 33.39 kg/m .  Physical Exam   GENERAL: healthy, alert and no distress  EYES: Eyes grossly normal to inspection, PERRL and conjunctivae and sclerae normal  HENT: ear canals and TM's normal, nose and mouth without ulcers or lesions  NECK: no adenopathy, no asymmetry, masses, or scars and thyroid normal to palpation  RESP: lungs clear to auscultation - no rales, rhonchi or wheezes  CV: regular rate and rhythm, normal S1 S2, no S3 or S4, no murmur, click or rub, no peripheral edema and peripheral  pulses strong  ABDOMEN: soft, nontender, no hepatosplenomegaly, no masses and bowel sounds normal  MS: no gross musculoskeletal defects noted, no edema  SKIN: no suspicious lesions or rashes  NEURO: Normal strength and tone, mentation intact and speech normal  BACK: no CVA tenderness, no paralumbar tenderness  PSYCH: mentation appears normal, affect normal/bright  LYMPH: no cervical, supraclavicular, axillary, or inguinal adenopathy    Results for orders placed or performed in visit on 10/18/21   TSH with free T4 reflex     Status: Abnormal   Result Value Ref Range    TSH 0.14 (L) 0.40 - 4.00 mU/L   HIV Antigen Antibody Combo     Status: Normal   Result Value Ref Range    HIV Antigen Antibody Combo Nonreactive Nonreactive   Treponema Abs w Reflex to RPR and Titer     Status: Abnormal   Result Value Ref Range    Treponema Antibody Total Reactive (A) Nonreactive   Hepatitis C Screen Reflex to HCV RNA Quant and Genotype     Status: Normal   Result Value Ref Range    Hepatitis C Antibody Nonreactive Nonreactive    Narrative    Assay performance characteristics have not been established for newborns, infants, and children.   T4 free     Status: Normal   Result Value Ref Range    Free T4 1.23 0.76 - 1.46 ng/dL   Rapid Plasma Reagin w Rflx to TITER     Status: Normal   Result Value Ref Range    Rapid Plasma Reagin Nonreactive Nonreactive    Narrative    This test should not be used as a primary diagnostic approach for syphilis, and a serum specimen should be submitted for a treponemal-specific antibody test.    Biological false-positive reactions with cardiolipin-type antigens have been reported in disease such as infectious mononucleosis, leprosy, malaria, lupus erythematosus, vaccinia, and viral pneumonia.  Pregnancy, autoimmune diseases, and narcotic additions may give false-positives. Pinta, yaws,bejel, and other treponemal diseases may also produce false-positive results with this test.   Wet prep - lab collect      Status: Abnormal    Specimen: Vagina; Swab   Result Value Ref Range    Trichomonas Absent Absent    Yeast Absent Absent    Clue Cells Absent Absent    WBCs/high power field 2+ (A) None   NEISSERIA GONORRHOEA PCR     Status: Normal    Specimen: Vagina; Swab   Result Value Ref Range    Neisseria gonorrhoeae Negative Negative   CHLAMYDIA TRACHOMATIS PCR     Status: Normal    Specimen: Vagina; Swab   Result Value Ref Range    Chlamydia trachomatis Negative Negative

## 2021-10-19 ENCOUNTER — TELEPHONE (OUTPATIENT)
Dept: FAMILY MEDICINE | Facility: CLINIC | Age: 51
End: 2021-10-19

## 2021-10-19 LAB
C TRACH DNA SPEC QL NAA+PROBE: NEGATIVE
HCV AB SERPL QL IA: NONREACTIVE
HIV 1+2 AB+HIV1 P24 AG SERPL QL IA: NONREACTIVE
N GONORRHOEA DNA SPEC QL NAA+PROBE: NEGATIVE
T PALLIDUM AB SER QL: REACTIVE

## 2021-10-19 NOTE — TELEPHONE ENCOUNTER
Patient called the clinic inquiring about the lab results dated 10/18/21.  Patient was able to view results via [employer].    Forwarded to the provider for review and planning.

## 2021-10-20 LAB — RPR SER QL: NONREACTIVE

## 2021-10-20 NOTE — TELEPHONE ENCOUNTER
Patient notified of Provider's message as written.  Patient verbalized understanding.    Arcelia Root RN  [facility]

## 2021-10-20 NOTE — TELEPHONE ENCOUNTER
The test do not show signs of active infection  There is a history of syphilis that makes the long term antibody positive - but the short term reactant is negative ( old infection)  Assuming this was treated in the past, does not need treatment    All other tests were negative for STD .  She is getting a little too much thyroid hormone - but does not need to change

## 2021-10-23 LAB — T PALLIDUM AB SER QL AGGL: REACTIVE

## 2021-12-21 NOTE — PROGRESS NOTES
Assessment & Plan   Liane is a 50 yo F with graves dx/hypothyroidism presenting with numbness and tingling of the feet and hands    Numbness and tingling: hands and feet  Urinalysis nonspecific but could be carpal tunnel syndrome, diabetic neuropathy personal history of diabetes, vitamin deficiency.  We will check baseline labs and if normal will consider urine check for abnormal proteins and or trial of medication; TCA or OCTAVIA  - Hemoglobin A1c; Future  - Glucose; Future  - Vitamin B12; Future  - Vitamin B1 whole blood; Future  - Vitamin B1 whole blood  - Vitamin B12  - Glucose  - Hemoglobin A1c    Thumb pain, right: DIP medially  Possibly arthritis  - Hemoglobin A1c; Future  - Glucose; Future  - Vitamin B12; Future  - Vitamin B1 whole blood; Future  - Vitamin B1 whole blood  - Vitamin B12  - Glucose  - Hemoglobin A1c    Acquired hypothyroidism  Recent TSH was slightly elevated with a normal T4.  We will maintain the same dose of levothyroxine and recheck in 2 to 3 months.    Persistent insomnia  This has been a longstanding problem; been on Elavil but does not seem to help.  Switched to third shift recently but still not able to sleep during the day.  Discussed evaluation by a sleep specialist to get a clear diagnosis; not ready for sleep study but the discussed this was going to be evaluation for lack of sleep  - SLEEP EVALUATION & MANAGEMENT REFERRAL - ADULT -; Future    Fatigue, unspecified type   - Most likely related to her lack of sleep.    Return in about 3 months (around 3/22/2022) for Follow up for symptoms recheck.    Joe Reyes MD  [facility] [place]    Subjective   Liane is a 51 year old who presents for the following health issues   HPI   Chief Complaint   Patient presents with     Recheck Medication     Discuss getting checked for Diabetes also has numbing in hands and feet      Hypothyroidism Follow-up    Since last visit, patient describes the following symptoms: Weight  "stable, no hair loss, no skin changes, no constipation, no loose stools      Insomnia:   Switched swifts about 2 weeks ago.     Numbness    Has had several birthdays the last 2 months. Hands and bottom of the feet. Drinks a lot of water. No polyuria. Vision usually okay; has prescriptions.  Has numbness and tingling: hands and feet. Had cold feet but   The symptom is there all the time.      How many servings of fruits and vegetables do you eat daily?  4 or more    On average, how many sweetened beverages do you drink each day (Examples: soda, juice, sweet tea, etc.  Do NOT count diet or artificially sweetened beverages)?   0    How many days per week do you exercise enough to make your heart beat faster? 7    How many minutes a day do you exercise enough to make your heart beat faster? 10 - 19    How many days per week do you miss taking your medication? 0      Review of Systems   Constitutional, HEENT, cardiovascular, pulmonary, gi and gu systems are negative, except as otherwise noted.      Objective    /82 (BP Location: Right arm, Cuff Size: Adult Regular)   Pulse 89   Temp 99.1  F (37.3  C) (Oral)   Resp 17   Ht 1.76 m (5' 9.29\")   Wt 98.2 kg (216 lb 9.6 oz)   LMP 06/07/2018   SpO2 98%   BMI 31.72 kg/m    Body mass index is 31.72 kg/m .  Physical Exam   GENERAL: Appears fatigued, alert and no distress  HANDS: Phalen test negative.  RESP: lungs clear to auscultation - no rales, rhonchi or wheezes  CV: regular rate and rhythm, no murmur, click or rub, no peripheral edema   MS: no gross musculoskeletal defects noted, no edema  PSYCH: mentation appears normal, affect normal/bright    "

## 2021-12-22 ENCOUNTER — OFFICE VISIT (OUTPATIENT)
Dept: FAMILY MEDICINE | Facility: CLINIC | Age: 51
End: 2021-12-22
Payer: COMMERCIAL

## 2021-12-22 VITALS
WEIGHT: 216.6 LBS | BODY MASS INDEX: 32.08 KG/M2 | TEMPERATURE: 99.1 F | HEART RATE: 89 BPM | OXYGEN SATURATION: 98 % | DIASTOLIC BLOOD PRESSURE: 82 MMHG | HEIGHT: 69 IN | SYSTOLIC BLOOD PRESSURE: 138 MMHG | RESPIRATION RATE: 17 BRPM

## 2021-12-22 DIAGNOSIS — G47.00 PERSISTENT INSOMNIA: ICD-10-CM

## 2021-12-22 DIAGNOSIS — E03.9 ACQUIRED HYPOTHYROIDISM: ICD-10-CM

## 2021-12-22 DIAGNOSIS — R20.0 NUMBNESS AND TINGLING: Primary | ICD-10-CM

## 2021-12-22 DIAGNOSIS — M79.644 THUMB PAIN, RIGHT: ICD-10-CM

## 2021-12-22 DIAGNOSIS — R20.2 NUMBNESS AND TINGLING: Primary | ICD-10-CM

## 2021-12-22 DIAGNOSIS — R53.83 FATIGUE, UNSPECIFIED TYPE: ICD-10-CM

## 2021-12-22 LAB
FASTING STATUS PATIENT QL REPORTED: ABNORMAL
GLUCOSE BLD-MCNC: 126 MG/DL (ref 70–99)
HBA1C MFR BLD: 5 % (ref 0–5.6)
VIT B12 SERPL-MCNC: 354 PG/ML (ref 193–986)

## 2021-12-22 PROCEDURE — 83036 HEMOGLOBIN GLYCOSYLATED A1C: CPT | Performed by: FAMILY MEDICINE

## 2021-12-22 PROCEDURE — 36415 COLL VENOUS BLD VENIPUNCTURE: CPT | Performed by: FAMILY MEDICINE

## 2021-12-22 PROCEDURE — 99000 SPECIMEN HANDLING OFFICE-LAB: CPT | Performed by: FAMILY MEDICINE

## 2021-12-22 PROCEDURE — 84425 ASSAY OF VITAMIN B-1: CPT | Mod: 90 | Performed by: FAMILY MEDICINE

## 2021-12-22 PROCEDURE — 82947 ASSAY GLUCOSE BLOOD QUANT: CPT | Performed by: FAMILY MEDICINE

## 2021-12-22 PROCEDURE — 82607 VITAMIN B-12: CPT | Performed by: FAMILY MEDICINE

## 2021-12-22 PROCEDURE — 99214 OFFICE O/P EST MOD 30 MIN: CPT | Performed by: FAMILY MEDICINE

## 2021-12-22 RX ORDER — LEVOTHYROXINE SODIUM 125 UG/1
125 TABLET ORAL DAILY
Qty: 90 TABLET | Refills: 1 | Status: SHIPPED | OUTPATIENT
Start: 2021-12-22 | End: 2022-08-26

## 2021-12-22 ASSESSMENT — PAIN SCALES - GENERAL: PAINLEVEL: NO PAIN (0)

## 2021-12-22 ASSESSMENT — MIFFLIN-ST. JEOR: SCORE: 1666.48

## 2021-12-24 ENCOUNTER — VIRTUAL VISIT (OUTPATIENT)
Dept: INTERNAL MEDICINE | Facility: CLINIC | Age: 51
End: 2021-12-24
Payer: COMMERCIAL

## 2021-12-24 DIAGNOSIS — R05.9 COUGH: ICD-10-CM

## 2021-12-24 DIAGNOSIS — B34.9 VIRAL SYNDROME: Primary | ICD-10-CM

## 2021-12-24 PROCEDURE — 99213 OFFICE O/P EST LOW 20 MIN: CPT | Mod: 95 | Performed by: INTERNAL MEDICINE

## 2021-12-24 RX ORDER — BENZONATATE 200 MG/1
200 CAPSULE ORAL 3 TIMES DAILY PRN
Qty: 60 CAPSULE | Refills: 1 | Status: SHIPPED | OUTPATIENT
Start: 2021-12-24 | End: 2022-06-01

## 2021-12-24 NOTE — PROGRESS NOTES
Liane is a 51 year old who is being evaluated via a billable video visit.      How would you like to obtain your AVS? Mail a copy  If the video visit is dropped, the invitation should be resent by: Text to cell phone: [phone]  Will anyone else be joining your video visit? No        This is a VIDEO ( using [employer])  encounter with the patient.       Location of the provider : office   Location of the patient : home      09:10 --- 09:30                 Dr Love's note      Patient's instructions / PLAN:                                                        Plan:  1. Test for Covid and flu  2. Benzonatate 200 mg 3 times a day as needed for cough         ASSESSMENT & PLAN:                                                      (B34.9) Viral syndrome  (primary encounter diagnosis)  Comment:   Plan: Symptomatic; Yes; 12/23/2021 COVID-19 Virus         (Coronavirus) by PCR, Influenza A/B antigen            (R05.9) Cough  Comment:   Plan: benzonatate (TESSALON) 200 MG capsule               Chief complaint:                                                      Cough, body aches    SUBJECTIVE:                                                    History of present illness:        Body ache, chills sweats, cough, since yesterday   She hasn't had vaccine for flu nor Covid  Works in an assisted living       Patient is being seen for chills, hor flashes and body aches.      Review of Systems:                                                      ROS: negative for fever, chills, cough, wheezes, chest pain, shortness of breath, vomiting, abdominal pain, leg swelling Pos for chills cough        OBJECTIVE:           An actual physical exam can't be done during phone visit   A limited exam can sometimes be performed by video visit   NAD      PMHx: reviewed  Past Medical History:   Diagnosis Date     Graves disease      Hypothyroidism following radioiodine therapy      Obesity      Syphilis 5/8/2015      PSHx: reviewed  Past Surgical  History:   Procedure Laterality Date     CHOLECYSTECTOMY  1998          HYSTERECTOMY, PAP NO LONGER INDICATED  07/13/2018     LAPAROSCOPIC ASSISTED HYSTERECTOMY VAGINAL  07/13/2018    adenomyosis, fibroids      OPEN REDUCTION INTERNAL FIXATION ANKLE Left 1990          TUBAL LIGATION          Meds: reviewed  Current Outpatient Medications   Medication Sig Dispense Refill     amitriptyline (ELAVIL) 50 MG tablet Take 1 tablet (50 mg) by mouth At Bedtime +++NEED ANNUAL EXAM+++ 90 tablet 0     benzonatate (TESSALON) 200 MG capsule Take 1 capsule (200 mg) by mouth 3 times daily as needed for cough 60 capsule 1     ferrous sulfate (IRON) 325 (65 Fe) MG tablet Take by mouth daily (with breakfast)       levothyroxine (SYNTHROID/LEVOTHROID) 125 MCG tablet Take 1 tablet (125 mcg) by mouth daily 90 tablet 1     naproxen (NAPROSYN) 500 MG tablet Take 1 tablet (500 mg) by mouth 2 times daily (with meals) 30 tablet 0     predniSONE (DELTASONE) 20 MG tablet Take 1 tablet (20 mg) by mouth daily 5 tablet 0     Prenatal MV-Min-Fe Fum-FA-DHA (PRENATAL 1 PO) Take 1 tablet by mouth daily       tiZANidine (ZANAFLEX) 4 MG tablet Take 1 tablet (4 mg) by mouth 3 times daily as needed for muscle spasms 30 tablet 0       Soc Hx: reviewed  Fam Hx: reviewed          Yovana Love MD  Internal Medicine

## 2021-12-26 LAB — VIT B1 PYROPHOSHATE BLD-SCNC: 95 NMOL/L

## 2022-01-08 ENCOUNTER — HEALTH MAINTENANCE LETTER (OUTPATIENT)
Age: 52
End: 2022-01-08

## 2022-03-10 PROBLEM — Z86.39 HISTORY OF GRAVES' DISEASE: Chronic | Status: ACTIVE | Noted: 2019-02-19

## 2022-03-10 PROBLEM — F51.04 CHRONIC INSOMNIA: Status: ACTIVE | Noted: 2022-03-10

## 2022-03-14 ENCOUNTER — VIRTUAL VISIT (OUTPATIENT)
Dept: SLEEP MEDICINE | Facility: CLINIC | Age: 52
End: 2022-03-14
Payer: COMMERCIAL

## 2022-03-14 VITALS — HEIGHT: 69 IN | WEIGHT: 216 LBS | BODY MASS INDEX: 31.99 KG/M2

## 2022-03-14 DIAGNOSIS — R06.00 DYSPNEA AND RESPIRATORY ABNORMALITY: ICD-10-CM

## 2022-03-14 DIAGNOSIS — R06.89 DYSPNEA AND RESPIRATORY ABNORMALITY: ICD-10-CM

## 2022-03-14 DIAGNOSIS — G47.9 DISTURBANCE IN SLEEP BEHAVIOR: ICD-10-CM

## 2022-03-14 DIAGNOSIS — E66.811 CLASS 1 OBESITY DUE TO EXCESS CALORIES WITHOUT SERIOUS COMORBIDITY WITH BODY MASS INDEX (BMI) OF 31.0 TO 31.9 IN ADULT: Chronic | ICD-10-CM

## 2022-03-14 DIAGNOSIS — G47.00 PERSISTENT INSOMNIA: Primary | ICD-10-CM

## 2022-03-14 DIAGNOSIS — R03.0 ELEVATED BLOOD PRESSURE READING WITHOUT DIAGNOSIS OF HYPERTENSION: ICD-10-CM

## 2022-03-14 DIAGNOSIS — E66.09 CLASS 1 OBESITY DUE TO EXCESS CALORIES WITHOUT SERIOUS COMORBIDITY WITH BODY MASS INDEX (BMI) OF 31.0 TO 31.9 IN ADULT: Chronic | ICD-10-CM

## 2022-03-14 PROCEDURE — 99205 OFFICE O/P NEW HI 60 MIN: CPT | Mod: 95 | Performed by: INTERNAL MEDICINE

## 2022-03-14 RX ORDER — ZOLPIDEM TARTRATE 5 MG/1
TABLET ORAL
Qty: 1 TABLET | Refills: 0 | Status: SHIPPED | OUTPATIENT
Start: 2022-03-14 | End: 2022-03-21

## 2022-03-14 ASSESSMENT — SLEEP AND FATIGUE QUESTIONNAIRES
HOW LIKELY ARE YOU TO NOD OFF OR FALL ASLEEP WHILE SITTING INACTIVE IN A PUBLIC PLACE: MODERATE CHANCE OF DOZING
HOW LIKELY ARE YOU TO NOD OFF OR FALL ASLEEP WHILE SITTING AND TALKING TO SOMEONE: MODERATE CHANCE OF DOZING
HOW LIKELY ARE YOU TO NOD OFF OR FALL ASLEEP WHEN YOU ARE A PASSENGER IN A CAR FOR AN HOUR WITHOUT A BREAK: WOULD NEVER DOZE
HOW LIKELY ARE YOU TO NOD OFF OR FALL ASLEEP IN A CAR, WHILE STOPPED FOR A FEW MINUTES IN TRAFFIC: WOULD NEVER DOZE
HOW LIKELY ARE YOU TO NOD OFF OR FALL ASLEEP WHILE LYING DOWN TO REST IN THE AFTERNOON WHEN CIRCUMSTANCES PERMIT: MODERATE CHANCE OF DOZING
HOW LIKELY ARE YOU TO NOD OFF OR FALL ASLEEP WHILE SITTING AND READING: HIGH CHANCE OF DOZING
HOW LIKELY ARE YOU TO NOD OFF OR FALL ASLEEP WHILE SITTING QUIETLY AFTER LUNCH WITHOUT ALCOHOL: MODERATE CHANCE OF DOZING
HOW LIKELY ARE YOU TO NOD OFF OR FALL ASLEEP WHILE WATCHING TV: HIGH CHANCE OF DOZING

## 2022-03-14 NOTE — PATIENT INSTRUCTIONS
Insomnia and Behavioral Sleep Medicine Program    The [facility] Insomnia and Behavioral Sleep Medicine Program provides non-drug treatment for sleep problems including:      Cognitive-behavioral Therapies for Insomnia (CBT-I)    Management of Shift-work and Jet Lag    Management of Delayed, Advanced and Irregular Circadian Rhythm Sleep Disorders    Imagery Rehearsal Therapy (IRT) for Nightmare Disorder    PAP Therapy Desensitization    You have been referred for consultation with a sleep psychologist who specializes in behavioral sleep medicine and treatment of insomnia.  [facility]omnia [facility] Behavioral [facility] offers individualized telehealth services through our [facility] and online CBT-I.    Preparing for your Consultation    You will need to keep a Sleep Diary for at least a week prior to your visit. Avoid watching the clock or recording data during the night. Complete the sleep diary each day first thing after you get up by answering a few key questions about your sleep.  Your answers should be based on your recall of the past 24 hours.    Insomnia  Sami    The Insomnia  Sami is a convenient way to keep track of your sleep prior to your sleep consultation.  Simply download the free sami on your Apple or Android phone and record your information each morning.  The sami also includes training, assessment, and sleep schedule recommendations.  For the purposes of your consultation, we recommend you use only the sleep diary function. You can e-mail yourself a copy of your sleep diary data by going to the Settings section and using the [place] User Data function.  During your consultation your provider will review the data with you.           [facility] Sleep Diary    You can also track your sleep using the [facility] paper sleep diary.  You can fax your sleep diaries to [phone] prior to your consultation or have it with you at the  time of your consultation.            CBT-I:  Frequently Asked Questions    What is CBT-I?    Cognitive Behavioral Therapy for Insomnia, also known as CBT-I, is a highly effective non-drug treatment for insomnia. The American College of Physicians recommends CBT-I as the first treatment for chronic insomnia.  Research has shown CBT-I to be safer and more effective long term than sleeping pills.    What does CBT-I involve?     CBT-I targets behaviors that lead to chronic insomnia:    Habits that weaken the bed as a cue for sleep    Habits that weaken your body's sleep drive and sleep/wake clock     Unhelpful sleep thoughts that increase sleep-related worry and arousal.    The process works like a 4-6 session training program that provides you with the information and coaching needed to implement proven strategies to get a better night's sleep.    People often see improvement in their sleep within a few weeks. Research shows if you keep practicing the skills you learn your sleep is likely to continue to improve 6-12 months after treatment.    Does this program prescribe or manage sleep medication?    No.  Your prescribing provider is responsible to assist you in managing your sleep medications.  Some people choose to stop using sleep medication prior to or during CBT-I.  Our program can work with your prescribing provider to help reduce or eliminate use of sleep medications.     Getting Started Today!    As you prepare for your sleep consultation, we recommend you consider making the following changes to your sleep habits if you haven't already done so:    ? Reduce your consumption of caffeine and alcohol.  Both can disrupt sleep and make strengthening your sleep more difficult.  Specifically:    - Avoid caffeine within 6 hours of bedtime   - No more than 3 caffeinated beverages per day (e.g. 8 oz. cup coffee or 12 oz. cup soda)            - No alcohol within 3 hours of bedtime    ? Make sure your bedroom is quiet,  comfortable and dark.  Noise, light and an uncomfortable sleep space can harm your sleep.      ? Keep the same sleep schedule 7 days a week.unless you do shift work.      Sami and Online CBT-I     If you want to get started today, research indicates that sami-based and online CBT-I can be effective for some individuals. There programs requires comfort with sami-based or online learning and confidence that making changes in sleep habits can improve your sleep. However, digital CBT-I programs are not for everyone.  Contraindications include:       Seizure disorders,     Bipolar disorder,     Unstable medical or mental health conditions,     Frailty or risk of falling    Pregnant    You should consult a sleep specialist before using these resources if you have:      Sleep Apnea    Restless Leg Syndrome    Sleep Walking    REM behavior disorder    Night Terrors    Excessive Daytime Sleepiness    Are engaged in shift work    Or are using prescription sleep medication    Insomnia  training program    Insomnia  is a mobile sami developed by the [employer] involving a core 5-week training program.  The application is downloadable for free on Android and Apple phones.             Our Online CBT-I program    Our online CBT-I program is formatted for use on computers and mobile devices.  The cost for an entire 6-week program is $40.         If you opt to try online CBT-I prior to your sleep psychology consultation, we ask that you sign a consent for the [facility] to share your online CBT-I information with our sleep program.  This can be done by entering the sharing code [employer].    To get started, copy and paste the link below which will take you to the landing page to register:            [email or web address]                                 Self-help workbooks for insomnia    If you have found self-help books useful in the past, you may want to consider reading one of the  following books prior to your consultation:      Say [place] to Insomnia: The Six-Week, Drug-Free Program Developed at [facility].  Jose Andersen MD. Available in paperback, [place], and audiobook.      Overcoming Insomnia: A Cognitive-Behavioral Therapy Approach, Workbook.  Bill Castro, PhD  and Rosalva Coates, PhD.  Available in paperback and Edilson.      Quiet Your Mind and Get to Sleep: Solutions to Insomnia for Those with Depression, Anxiety, or Chronic Pain.  Pamela Aceves PhD and Rosalva Coates, PhD.  Available in paperback and Edilson

## 2022-03-14 NOTE — PROGRESS NOTES
Liane is a 51 year old who is being evaluated via a billable video visit.      How would you like to obtain your AVS? MyChart  If the video visit is dropped, the invitation should be resent by: Text to cell phone: [phone]  Will anyone else be joining your video visit? No      Video Start Time: 10:55 AM      Video-Visit Details    Type of service:  Video Visit    Video End Time:11:59 AM    Originating Location (pt. Location): Home    Distant Location (provider location):  [facility]     Platform used for Video Visit: [facility]         Outpatient Sleep Medicine Consultation:  March 14, 2022    Name: Liane Key MRN# [id number]   Age: 51 year old YOB: 1970     Date of Consultation: March 14, 2022  Consultation is requested by:  Joe Reyes MD  Primary care provider: Nahomi Castañeda       Reason for Sleep Consult:     Liane Key is sent byJoe Reyes MD for a sleep consultation regarding insomnia.    Chief Complaint   Patient presents with     Sleep Study     don't sleep well, don't stay asleep               Assessment and Plan:     Sleep onset, maintenance difficulties   Suspect psychophysiologic insomnia (JULIO 22). We discussed stimulus control   - Read the book Say Good Night To Insomnia    - Refferral for cognitive behavioral training  Complicated by poor sleep hygine.     Snoring, witnessed apneas, sleep maintenance difficulties, night sweats, denies significant fatigue/fxcessive daytime sleepiness but ESS 14, crowded oropharynx, obesity  - Polysomnogram (using 4% desaturation/Medicare/ AASM 1B scoring rules) for moderate-high probability obstructive sleep apnea. Elavil/ambien if needed. Patient is a poor candidate for Home Sleep Testing due to chronic severe insomnia (JULIO score 22).        Summary Counseling:    Sleep Testing Reviewed  Obstructive Sleep Apnea Reviewed  Complications of Untreated Sleep Apnea Reviewed      I spent 55 minutes  with patient including counseling, and 10 minutes with chart review, and documentation     CC:  Joe Reyes MD         History of Present Illness:     SLEEP-WAKE SCHEDULE:     Work/School Days: Patient goes to school/work: Days   Usually gets into bed at 830-9 and watches TV until the news   Has trouble falling asleep 5 nights per week with use of sleeping pill (amitriptyline?)   She admits problems with over-active mind   Wakes up in the middle of the night 2-3 times due to uncertain reasons, use bathroom   She has trouble falling back asleep most nights probably due to an over-active mind    Patient is usually up at 6  Uses alarm: Yes, often awake before alarm    Weekends/Non-work Days/All Other Days:  Usually gets into bed at 9  Patient is usually up at 7 or later on occasion, but usually gets back into bed and watches TV    Liane Key prefers to sleep in this position(s):   back, side, stomach  Patient states they do the following activities in bed:  Watch TV    Naps  Patient takes a purposeful naps rarely   She dozes off unintentionally 1 days per week  Patient has not had a driving accident or near-miss due to sleepiness/drowsiness:        SLEEP DISRUPTIONS:    Breathing/Snoring  Patient snores: Yes   Other people complain about her snoring:  No  Patient has been told she stops breathing in her sleep: yes    She has issues with the following:  No morning headaches, or nocturnal reflux    Movement:  She gets leg cramps.   She gets pains in her feet up her leg right>left   Pain makes her legs jump  There is no urge to move  It happens any particular time  It happens more at night: No  Patient has not been told she kicks her legs at night:      Behaviors in Sleep:  Liane Key has experienced the following behaviors while sleeping:   Sleep talking  Has sleep walked and gone to bathroom but not in bathroom   Last episode was last year      CAFFEINE AND OTHER SUBSTANCES:    Number of caffeinated  beverages: infrequent    Patient drinks alcohol to help them sleep:  Yes 10 PM    Family History:  Patient has a family member been diagnosed with a sleep disorder:              SCALES:    EPWORTH SLEEPINESS SCALE      Ludlow Sleepiness Scale ( KENNEDY Pena  1990-1997[facility] [place]/English - Final version - 21 Nov 07 - [facility] [place].) 3/14/2022   Sitting and reading High chance of dozing   Watching TV High chance of dozing   Sitting, inactive in a public place (e.g. a theatre or a meeting) Moderate chance of dozing   As a passenger in a car for an hour without a break Would never doze   Lying down to rest in the afternoon when circumstances permit Moderate chance of dozing   Sitting and talking to someone Moderate chance of dozing   Sitting quietly after a lunch without alcohol Moderate chance of dozing   In a car, while stopped for a few minutes in traffic Would never doze   Ludlow Score (MC) 0   Ludlow Score (Sleep) 14         INSOMNIA SEVERITY INDEX (JULIO)      Insomnia Severity Index (JULIO) 3/14/2022   Difficulty falling asleep 3   Difficulty staying asleep 3   Problems waking up too early 3   How SATISFIED/DISSATISFIED are you with your CURRENT sleep pattern? 4   How NOTICEABLE to others do you think your sleep problem is in terms of impairing the quality of your life? 2   How WORRIED/DISTRESSED are you about your current sleep problem? 4   To what extent do you consider your sleep problem to INTERFERE with your daily functioning (e.g. daytime fatigue, mood, ability to function at work/daily chores, concentration, memory, mood, etc.) CURRENTLY? 3   JULIO Total Score 22       Guidelines for Scoring/Interpretation:    Total score categories:  0-7 = No clinically significant insomnia   8-14 = Subthreshold insomnia   15-21 = Clinical insomnia (moderate severity)  22-28 = Clinical insomnia (severe)    Used via courtesy of www.Qlibriealth.va.gov with permission from Reji Zamorano PhD., [facility]  [place]      Allergies:    No Known Allergies    Medications:    Current Outpatient Medications   Medication Sig Dispense Refill     amitriptyline (ELAVIL) 50 MG tablet Take 1 tablet (50 mg) by mouth At Bedtime +++NEED ANNUAL EXAM+++ 90 tablet 0     benzonatate (TESSALON) 200 MG capsule Take 1 capsule (200 mg) by mouth 3 times daily as needed for cough 60 capsule 1     ferrous sulfate (IRON) 325 (65 Fe) MG tablet Take by mouth daily (with breakfast)       levothyroxine (SYNTHROID/LEVOTHROID) 125 MCG tablet Take 1 tablet (125 mcg) by mouth daily 90 tablet 1     naproxen (NAPROSYN) 500 MG tablet Take 1 tablet (500 mg) by mouth 2 times daily (with meals) 30 tablet 0     predniSONE (DELTASONE) 20 MG tablet Take 1 tablet (20 mg) by mouth daily 5 tablet 0     Prenatal MV-Min-Fe Fum-FA-DHA (PRENATAL 1 PO) Take 1 tablet by mouth daily       tiZANidine (ZANAFLEX) 4 MG tablet Take 1 tablet (4 mg) by mouth 3 times daily as needed for muscle spasms 30 tablet 0       Problem List:  Patient Active Problem List    Diagnosis Date Noted     Elevated blood pressure reading without diagnosis of hypertension 06/12/2018     Priority: Medium     Chronic insomnia 03/10/2022     Priority: Low     History of Graves' disease 02/19/2019     Priority: Low     Class 1 obesity due to excess calories without serious comorbidity with body mass index (BMI) of 31.0 to 31.9 in adult      Priority: Low     Hypothyroidism following radioiodine therapy 09/20/2012     Priority: Low     CARDIOVASCULAR SCREENING; LDL GOAL LESS THAN 160 03/01/2012     Priority: Low        Past Medical/Surgical History:  Past Medical History:   Diagnosis Date     Graves disease      Hypothyroidism following radioiodine therapy      Obesity      Syphilis 5/8/2015     Past Surgical History:   Procedure Laterality Date     CHOLECYSTECTOMY  1998          HYSTERECTOMY, PAP NO LONGER INDICATED  07/13/2018     LAPAROSCOPIC ASSISTED HYSTERECTOMY VAGINAL  07/13/2018    adenomyosis,  fibroids      OPEN REDUCTION INTERNAL FIXATION ANKLE Left           TUBAL LIGATION         Social History:  Social History     Socioeconomic History     Marital status:      Spouse name: Not on file     Number of children: 4     Years of education: Not on file     Highest education level: Not on file   Occupational History     Occupation: works with disabled      Employer: [employer]   Tobacco Use     Smoking status: Former Smoker     Packs/day: 0.20     Years: 5.00     Pack years: 1.00     Types: Cigarettes     Quit date: 2012     Years since quittin.7     Smokeless tobacco: Never Used   Vaping Use     Vaping Use: Never used   Substance and Sexual Activity     Alcohol use: Yes     Comment: nightly one     Drug use: Not Currently     Types: Marijuana     Sexual activity: Yes     Partners: Male     Birth control/protection: Female Surgical, Pill   Other Topics Concern     Parent/sibling w/ CABG, MI or angioplasty before 65F 55M? No   Social History Narrative    Lives with her 2 grand kids at home.      Social Determinants of Health     Financial Resource Strain: Not on file   Food Insecurity: Not on file   Transportation Needs: Not on file   Physical Activity: Not on file   Stress: Not on file   Social Connections: Not on file   Intimate Partner Violence: Not on file   Housing Stability: Not on file       Family History:  Family History   Problem Relation Age of Onset     Unknown/Adopted Maternal Grandmother      Unknown/Adopted Maternal Grandfather      Unknown/Adopted Paternal Grandmother      Unknown/Adopted Paternal Grandfather      Depression Sister      Thyroid Disease Maternal Aunt      Cancer No family hx of      Diabetes No family hx of      Hypertension No family hx of      Cerebrovascular Disease No family hx of      Glaucoma No family hx of      Macular Degeneration No family hx of        Review of Systems:  A complete review of systems reviewed by me is negative  "with the exeption of what has been mentioned in the history of present illness.    Night sweats, occasional dyspnea on exertion     Physical Examination:  Vitals: Ht 1.76 m (5' 9.29\")   Wt 98 kg (216 lb)   LMP 06/07/2018   BMI 31.63 kg/m    BMI= Body mass index is 31.63 kg/m .    SpO2 Readings from Last 4 Encounters:   12/22/21 98%   10/18/21 100%   10/12/21 98%   08/02/21 100%       GENERAL APPEARANCE: alert and no distress  EYES: Eyes grossly normal to inspection  HENT: mouth without ulcers or lesions  NECK: not overly generous size  LUNGS: no shortness of breath , cough  NEURO: mentation intact, speech normal and cranial nerves 2-12 appear intact  PSYCH: affect normal/bright  Mallampati Class: 3-4           Data: All pertinent previous laboratory data reviewed     Recent Labs   Lab Test 12/22/21  1041 02/19/19  1802   * 108*       Recent Labs   Lab Test 12/26/18  0830 06/26/18  1822 04/05/18  1837   WBC  --   --  7.2   RBC  --   --  3.90   HGB 12.3   < > 9.2*   HCT  --   --  29.7*   MCV  --   --  76*   MCH  --   --  23.6*   MCHC  --   --  31.0*   RDW  --   --  17.5*   PLT  --   --  334    < > = values in this interval not displayed.       Recent Labs   Lab Test 06/10/21  1401   PROTTOTAL 7.7   ALBUMIN 3.7   BILITOTAL 0.7   ALKPHOS 50   AST 16   ALT 19       Ferritin   Date/Time Value Ref Range Status   10/30/2013 04:39 PM 11 10 - 120 ng/mL Final     Iron   Date/Time Value Ref Range Status   10/30/2013 04:39 PM 38 35 - 180 ug/dL Final       Sher Rodrigues MD 3/14/2022         "

## 2022-03-14 NOTE — NURSING NOTE
Sleep study and return visit for results has been scheduled by central scheduling. Sleep study information packet/letter send via [employer].      Sara Snow MALACHI  [facility] [place]

## 2022-03-21 DIAGNOSIS — G47.9 DISTURBANCE IN SLEEP BEHAVIOR: ICD-10-CM

## 2022-03-21 DIAGNOSIS — R06.89 DYSPNEA AND RESPIRATORY ABNORMALITY: ICD-10-CM

## 2022-03-21 DIAGNOSIS — G47.00 PERSISTENT INSOMNIA: ICD-10-CM

## 2022-03-21 DIAGNOSIS — E66.811 CLASS 1 OBESITY DUE TO EXCESS CALORIES WITHOUT SERIOUS COMORBIDITY WITH BODY MASS INDEX (BMI) OF 31.0 TO 31.9 IN ADULT: Chronic | ICD-10-CM

## 2022-03-21 DIAGNOSIS — R03.0 ELEVATED BLOOD PRESSURE READING WITHOUT DIAGNOSIS OF HYPERTENSION: ICD-10-CM

## 2022-03-21 DIAGNOSIS — R06.00 DYSPNEA AND RESPIRATORY ABNORMALITY: ICD-10-CM

## 2022-03-21 DIAGNOSIS — E66.09 CLASS 1 OBESITY DUE TO EXCESS CALORIES WITHOUT SERIOUS COMORBIDITY WITH BODY MASS INDEX (BMI) OF 31.0 TO 31.9 IN ADULT: Chronic | ICD-10-CM

## 2022-03-21 RX ORDER — ZOLPIDEM TARTRATE 5 MG/1
TABLET ORAL
Qty: 1 TABLET | Refills: 0 | Status: SHIPPED | OUTPATIENT
Start: 2022-03-21 | End: 2022-07-21

## 2022-03-21 NOTE — TELEPHONE ENCOUNTER
Patient picked up her #1 tablet of Zolpidem and took it before sleep study. She forgot she was supposed to hold it for sleep study night.  CMA told patient she would ask if they would send in one more tablet of Zolpidem, but that she will have to hold that one until the sleep study.  She states understanding. Dr. Rodrigues out this week, Requesting covering provider review.   Rula Rivas CMA

## 2022-04-17 NOTE — PROGRESS NOTES
Has seen many different health care providers mostly within [facility], including   Dr. Rodrigues, Dr. Reyes, Dr. Dow, Dr. Killian, Marcelina Owens physicians assistant , Myra Cadet, VAL SAAVEDRA all within one year other [facility] care [facility] health care provider and Dr. Ruben Casas, podiatrist 6/2021

## 2022-04-18 ENCOUNTER — OFFICE VISIT (OUTPATIENT)
Dept: INTERNAL MEDICINE | Facility: CLINIC | Age: 52
End: 2022-04-18
Payer: COMMERCIAL

## 2022-04-18 VITALS
WEIGHT: 226 LBS | DIASTOLIC BLOOD PRESSURE: 96 MMHG | OXYGEN SATURATION: 99 % | SYSTOLIC BLOOD PRESSURE: 140 MMHG | RESPIRATION RATE: 12 BRPM | TEMPERATURE: 98 F | BODY MASS INDEX: 33.1 KG/M2 | HEART RATE: 93 BPM

## 2022-04-18 DIAGNOSIS — S39.012A STRAIN OF LUMBAR REGION, INITIAL ENCOUNTER: ICD-10-CM

## 2022-04-18 DIAGNOSIS — S33.5XXA LUMBAR SPRAIN, INITIAL ENCOUNTER: Primary | ICD-10-CM

## 2022-04-18 PROCEDURE — 99213 OFFICE O/P EST LOW 20 MIN: CPT | Performed by: INTERNAL MEDICINE

## 2022-04-18 NOTE — PROGRESS NOTES
Assessment & Plan     (S33.5XXA) Lumbar sprain, initial encounter  (primary encounter diagnosis)  Comment: I am seeing this patient for the first time. She is dealing with significant pain form her lumbar sprain, see history as detailed below. there are no worrisome features, no features of Cauda equina syndrome with saddle anasthesia / pelvic numbness and no bladder or bowel function problems . No features of sciatica , this is entirely pain and stiffness with backm especially left sided lumbar levels 1-3 or so, possibly quadratus lumborums   Plan: we recommended supportive measures and see patient after-visit summary. If she isn't better in 3-5 days she will be referred to [facility]     (S39.012A) Strain of lumbar region, initial encounter  Comment: as above , interestingly patient told me this was her first significant lumbar sprain and yet she already had a prescription for Zanaflex (Tizanidine) given last fall following returned to the hospital same thing as now,  Plan: tiZANidine (ZANAFLEX) 4 MG tablet              Prescription drug management  23 minutes spent on the date of the encounter doing chart review, history and exam, documentation and further activities per the note      Return if symptoms worsen or fail to improve.    Gopi Iraheta MD  [facility]    John Rob is a 51 year old who presents for the following health issues   Encounter Diagnoses   Name Primary?     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, including   Dr. Rodrigues, Dr. Reyes, Dr. Dow, Dr. Killian, Marcelina Owens physicians assistant , Myra Cadet, VAL  CNP all within one year other urgent care clinic health care provider and Dr. Ruben Casas, podiatrist 6/2021    Injury with moving a mattress on Thursday   Able to work Friday   Using patches, ibuprofen and acetaminophen and has a massage chair and heating pad  Yesterday she said I just can't do it anymore   Can't sleep, can't turn, wearing pantyliners because of her slowness and can't make it the the bathroom   She gets tweaks of pain, some pain right between her legs. This pain stopped.  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 And the constant up and down action with work is intolerable right now    I did hear this patient was taking at some times as many as 8 ibuprofen 200 milligrams at a single dose and I cautioned her against this, Maximum dose of ibuprofen is 800 milligrams     Concern - Lower left lumbar, pulled muscle   Onset: 04/14/2022  Description: sharp, cramping, stiff   Intensity: severe  Progression of Symptoms:  same and constant  Accompanying Signs & Symptoms: radiating across lower back  Previous history of similar problem: none  Precipitating factors:        Worsened by: movement   Alleviating factors:        Improved by: nothing   Therapies tried and outcome: icey hot, lidocaine numbing roll on, heat, muscle relaxer, not helping    Review of Systems   Constitutional, HEENT, cardiovascular, pulmonary, gi and gu systems are negative, except as otherwise noted.      Objective    BP (!) 140/96   Pulse 93   Temp 98  F (36.7  C) (Oral)   Resp 12   Wt 102.5 kg (226 lb)   LMP 06/07/2018   SpO2 99%   BMI 33.10 kg/m    Body mass index is 33.1 kg/m .  Physical Exam   GENERAL: healthy, alert and no distress  MS: no gross musculoskeletal defects noted, no edema, she has a great deal tautness / sprain to the lumbar paraspinous muscles , left sided and negative straight leg raising even with ankle flexing and there is no evidence  of sciatica     No orders of the defined types were placed in this encounter.

## 2022-04-18 NOTE — PATIENT INSTRUCTIONS
You've developed a lumbar sprain, a significant source of pain and stiffness and muscle dysfunction     It's going to take some time. 3-5 days is my guess. Treatment is    Low Back Strain    Low back pain is a fact of life. Just about everybody will suffer from it sooner or later. One of the main causes of back pain, whether acute or chronic, is low back strain.    So what is low back strain? A series of muscles and ligaments in your back hold the bones of your spinal column in place. You can strain these muscles by stretching them too far, causing tiny tears in the tissue. The muscles are then weakened, so they may not be able to hold the bones of your spinal column in place correctly. The spine becomes less stable, causing low back pain.    And because nerves stretch out from the spinal cord throughout the entire body, low back strain can cause pain in areas other than your back.    Low back strain can be caused by:    Extreme physical exertion.  Falling.  Bending or crouching repeatedly.  Lifting heavy objects if you are not in shape.  It can also be caused by emotional stress, improper posture, being overweight, out of shape, or sitting in the same position for long periods of time. Even a severe cough can result in low back strain.    Keep in mind that low back strain can't be blamed for all back pain. There are many other causes, like slipped discs, fractures, pinched nerves, arthritis, and infections.    What Does Low Back Strain Feel Like?  Symptoms of low back strain include:    Pain and stiffness in the back.  Pain in the buttocks and the legs, often in the back of the thigh.  Pain that worsens when bending, stretching, coughing, or sneezing.  Since some symptoms of low back strain are similar to those of more serious conditions, it's important to get checked out by a doctor. Any numbness and weakness in your legs, or bowel and bladder problems, can be a sign of nerve damage -- and that needs immediate  medical attention.    To diagnose low back strain, your doctor will give you a thorough exam. You may also need X-rays, MRIs (Magnetic Resonance Imaging), and CT scans. These extra tests may only be needed if your pain doesn't go away on its own or with conservative treatment.    What's the Treatment for Low Back Strain?  Low back strain can be a painful and depressing injury. But the good news is that most cases heal on their own, given time. To speed the healing, you should:    Ice your back to reduce pain and swelling as soon as you injure yourself. Do it for 20-30 minutes every 3-4 hours for 2-3 days. You can also ice your back after physical activity.  Apply heat to your back -- but only after 2-3 days of icing it first. Use heat on your back only after the initial swelling has gone down. You could use an electric heating pad or a hot water bottle. Or you could just soak in a hot bath.  Take painkillers or other drugs, if recommended by your doctor. Non-steroidal anti-inflammatory drugs (NSAIDs), like Advil, Aleve, or Motrin, will help with lower back pain and swelling. However, these drugs may have side effects. They should be used only occasionally, unless your doctor specifically says otherwise. Prescription painkillers and muscle relaxants are sometimes necessary.  Use support. Ask your doctor or therapist first, but consider getting a belt or girdle to add support to your back. Use it only short-term or for support with heavy or repetitive lifting.  Get physical therapy to build up strength, if your doctor recommends it. Do not stay in bed or on the couch all day. That will make it worse.  Maintain good muscle tone in your abdominal and lower back muscles.  No matter what people tell you, bed rest doesn't work. People used to think that the best treatment for low back strain was to lie on your back until you felt better. But studies show it doesn't help. In fact, after taking it easy for a day or two, you  should usually start light physical activity.

## 2022-04-18 NOTE — LETTER
[facility]  [address]  [place] [address]  [phone]          April 18, 2022    Liane Key                                                                                                                     [address]  [place] [address]            Dear Liane, and To Whom it May Concern,     Due to severe lumbar sprain patient is going to miss 2-3 days of work, possibly more.    Sincerely,         Gopi Iraheta MD

## 2022-04-19 ENCOUNTER — NURSE TRIAGE (OUTPATIENT)
Dept: FAMILY MEDICINE | Facility: CLINIC | Age: 52
End: 2022-04-19
Payer: COMMERCIAL

## 2022-04-19 DIAGNOSIS — M54.50 ACUTE BILATERAL LOW BACK PAIN WITHOUT SCIATICA: Primary | ICD-10-CM

## 2022-04-19 RX ORDER — IBUPROFEN 800 MG/1
800 TABLET, FILM COATED ORAL EVERY 8 HOURS PRN
Qty: 30 TABLET | Refills: 0 | Status: SHIPPED | OUTPATIENT
Start: 2022-04-19 | End: 2022-07-21

## 2022-04-19 NOTE — TELEPHONE ENCOUNTER
Patient called the clinic.  She reports that she was evaluated and treated for back pain on 4/18/22.  Patient continues to experience constant back pain that is mostly to the lower back and shoots to the upper area.  Patient reports having frequent muscle spasms, rates the pain as over 10/10.  Patient reports that she is unable to ambulate, she is incontinent of bladder as the pain is interfering with her ability to ambulate to the bathroom in a timely fashion.      tiZANidine (ZANAFLEX) 5 MG tablet that she had left at home since Thursday 4/14/22.    Patient reports that the provider prescribed a lesser dose of the  tiZANidine (ZANAFLEX) 4 MG tablet:with no relief.  Patient was unable to sleep last night.  Patient is currently receiving a massage with little relief.  Patient denies any other symptoms or concerns at this time.    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    Patient is requesting a stronger pain management medication, Ibuprofen (600mg or 800mg ) as needed to use in between the strong medication frequency.    Please send the prescription as soon as possible to the [place] [facility] in [place].            Reason for Disposition    Patient sounds very sick or weak to the triager    Additional Information    Negative: Passed out (i.e., fainted, collapsed and was not responding)    Negative: Shock suspected (e.g., cold/pale/clammy skin, too weak to stand, low BP, rapid pulse)    Negative: Sounds like a life-threatening emergency to the triager    Negative: Major injury to the back (e.g., MVA, fall > 10 feet or 3 meters, penetrating injury, etc.)    Negative: Pain in the upper back over the ribs (rib cage) that radiates (travels) into the chest    Negative: Pain in the upper back over the ribs (rib cage) and worsened by coughing (or clearly increases with breathing)    Negative: SEVERE  "back pain of sudden onset and age > 60    Negative: SEVERE abdominal pain (e.g., excruciating)    Negative: Abdominal pain and age > 60    Negative: Unable to urinate (or only a few drops) and bladder feels very full    Negative: Loss of bladder or bowel control (urine or bowel incontinence; wetting self, leaking stool) of new onset    Negative: Numbness (loss of sensation) in groin or rectal area    Negative: Pain radiates into groin, scrotum    Negative: Vomiting and pain over lower ribs of back (i.e., flank - kidney area)    Negative: Blood in urine (red, pink, or tea-colored)    Negative: Weakness of a leg or foot (e.g., unable to bear weight, dragging foot)    Answer Assessment - Initial Assessment Questions  1. ONSET: \"When did the pain begin?\"       Patient was evaluated and treated 4/18/22  2. LOCATION: \"Where does it hurt?\" (upper, mid or lower back)      back  3. SEVERITY: \"How bad is the pain?\"  (e.g., Scale 1-10; mild, moderate, or severe)    - MILD (1-3): doesn't interfere with normal activities     - MODERATE (4-7): interferes with normal activities or awakens from sleep     - SEVERE (8-10): excruciating pain, unable to do any normal activities       severe  4. PATTERN: \"Is the pain constant?\" (e.g., yes, no; constant, intermittent)       constantly  5. RADIATION: \"Does the pain shoot into your legs or elsewhere?\"      Upper back  6. CAUSE:  \"What do you think is causing the back pain?\"       unknown  7. BACK OVERUSE:  \"Any recent lifting of heavy objects, strenuous work or exercise?\"      No  8. MEDICATIONS: \"What have you taken so far for the pain?\" (e.g., nothing, acetaminophen, NSAIDS)      Zanaflex  9. NEUROLOGIC SYMPTOMS: \"Do you have any weakness, numbness, or problems with bowel/bladder control?\"      Incontinent of bladder  10. OTHER SYMPTOMS: \"Do you have any other symptoms?\" (e.g., fever, abdominal pain, burning with urination, blood in urine)        No    Protocols used: BACK " PAIN-A-[place]

## 2022-04-19 NOTE — TELEPHONE ENCOUNTER
Patient notified of provider's message as written. Patient states that she would like an additional medication that she can alternate with while taking Ibuprofen. She also states that the Zanaflex she was prescribed yesterday has not been helping.    Huddled with Malika Redman CNP and explained patient request. Malika Redman CNP advised that patient can alternate with Tylenol and Ibuprofen and continue with Zanaflex as previously prescribed. She states that she is unable to prescribe stronger pain medication at this time as she was not the one who saw patient for back pain previously.     Called and spoke with patient and provided her with Malika Redman CNP's recommendations. Patient unhappy with this and states that she needs something stronger. Advised patient that Dr. Iraheta is out of the office today and tomorrow. Patient advised that she could seek evaluation in urgent care or emergency department for evaluation. Patient states that she cannot go to urgent care at this time as she is caring for her granddaughter. Scheduled appointment with Dr. Reyes for 4/20/22 to discuss medication options to manage back pain going forward.      Hodan White RN   [facility]

## 2022-04-19 NOTE — TELEPHONE ENCOUNTER
Patient is calling again to check the status of this request. Routing to covering provider pool as patient is requesting a response by the end of the day and PCP is out of office.     GRICEL MaldonadoN RN  [facility]

## 2022-04-19 NOTE — TELEPHONE ENCOUNTER
Patient checking on status of request.  Explained that a message was sent to Gopi Mcintyre but he is not in the office today.  Explained that provider does typically work insket at home and we will call her back once provider addresses.  Patient is concerned that this may not be addressed today and is not sure what she can do for the pain.  Explained that she can call back in a couple of hours if she does not hear from us and can consider routing to covering providers then   Patient verbalized understanding.    Patient is requesting a stronger pain management medication, Ibuprofen (600mg or 800mg ) as needed to use in between the strong medication frequency.       Arcelia Root RN  [facility]

## 2022-04-21 ENCOUNTER — ANCILLARY PROCEDURE (OUTPATIENT)
Dept: GENERAL RADIOLOGY | Facility: CLINIC | Age: 52
End: 2022-04-21
Attending: FAMILY MEDICINE
Payer: COMMERCIAL

## 2022-04-21 ENCOUNTER — OFFICE VISIT (OUTPATIENT)
Dept: FAMILY MEDICINE | Facility: CLINIC | Age: 52
End: 2022-04-21

## 2022-04-21 VITALS
HEART RATE: 86 BPM | TEMPERATURE: 97.8 F | BODY MASS INDEX: 32.8 KG/M2 | DIASTOLIC BLOOD PRESSURE: 87 MMHG | WEIGHT: 224 LBS | SYSTOLIC BLOOD PRESSURE: 142 MMHG | OXYGEN SATURATION: 100 %

## 2022-04-21 DIAGNOSIS — Z12.31 ENCOUNTER FOR SCREENING MAMMOGRAM FOR MALIGNANT NEOPLASM OF BREAST: ICD-10-CM

## 2022-04-21 DIAGNOSIS — S39.012A STRAIN OF LUMBAR REGION, INITIAL ENCOUNTER: ICD-10-CM

## 2022-04-21 DIAGNOSIS — M53.3 SACROILIAC JOINT PAIN: ICD-10-CM

## 2022-04-21 DIAGNOSIS — S39.012A STRAIN OF LUMBAR REGION, INITIAL ENCOUNTER: Primary | ICD-10-CM

## 2022-04-21 PROCEDURE — 99214 OFFICE O/P EST MOD 30 MIN: CPT | Performed by: FAMILY MEDICINE

## 2022-04-21 PROCEDURE — 72100 X-RAY EXAM L-S SPINE 2/3 VWS: CPT | Performed by: RADIOLOGY

## 2022-04-21 RX ORDER — CYCLOBENZAPRINE HCL 10 MG
10 TABLET ORAL 3 TIMES DAILY PRN
Qty: 30 TABLET | Refills: 2 | Status: SHIPPED | OUTPATIENT
Start: 2022-04-21 | End: 2022-06-01

## 2022-04-21 RX ORDER — JNJ-78436735 50000000000 [PFU]/.5ML
SUSPENSION INTRAMUSCULAR
COMMUNITY
Start: 2022-01-07 | End: 2022-07-21

## 2022-04-21 ASSESSMENT — ENCOUNTER SYMPTOMS: BACK PAIN: 1

## 2022-04-21 ASSESSMENT — PATIENT HEALTH QUESTIONNAIRE - PHQ9
SUM OF ALL RESPONSES TO PHQ QUESTIONS 1-9: 16
SUM OF ALL RESPONSES TO PHQ QUESTIONS 1-9: 16
10. IF YOU CHECKED OFF ANY PROBLEMS, HOW DIFFICULT HAVE THESE PROBLEMS MADE IT FOR YOU TO DO YOUR WORK, TAKE CARE OF THINGS AT HOME, OR GET ALONG WITH OTHER PEOPLE: VERY DIFFICULT

## 2022-04-21 NOTE — PATIENT INSTRUCTIONS
Liane    It was a pleasure seeing you in clinic today.  Here's the plan:    Lumbar strain/SI joint pain - physical therapy referral, Xray's today, trial of flexeril.  Mammogram ordered    Let me know if you have questions.    Joseph Killian MD

## 2022-04-21 NOTE — PROGRESS NOTES
Assessment & Plan       ICD-10-CM    1. Strain of lumbar region, initial encounter  S39.012A Physical Therapy Referral     cyclobenzaprine (FLEXERIL) 10 MG tablet     XR Lumbar Spine 2/3 Views     XR Pelvis 1/2 Views     Spine Referral   2. Encounter for screening mammogram for malignant neoplasm of breast  Z12.31 MA Screening Digital Bilateral   3. Sacroiliac joint pain  M53.3 XR Pelvis 1/2 Views     Spine Referral     Patient Sonia Rob    It was a pleasure seeing you in clinic today.  Here's the plan:    1. Lumbar strain/SI joint pain - physical therapy referral, Xray's today, trial of flexeril.  2. Mammogram ordered    Let me know if you have questions.    Joseph Killian MD                  Review of external notes as documented elsewhere in note        Depression Screening Follow Up    PHQ 4/21/2022   PHQ-9 Total Score 16   Q9: Thoughts of better off dead/self-harm past 2 weeks Not at all     Last PHQ-9 4/21/2022   1.  Little interest or pleasure in doing things 3   2.  Feeling down, depressed, or hopeless 3   3.  Trouble falling or staying asleep, or sleeping too much 3   4.  Feeling tired or having little energy 3   5.  Poor appetite or overeating 1   6.  Feeling bad about yourself 0   7.  Trouble concentrating 0   8.  Moving slowly or restless 3   Q9: Thoughts of better off dead/self-harm past 2 weeks 0   PHQ-9 Total Score 16         Return in about 1 week (around 4/28/2022) for With Specialist.    Joseph Killian MD  [facility] [place] Rob is a 51 year old who presents for the following health issues     Back Pain     History of Present Illness       Back Pain:  She presents for follow up of back pain. Patient's back pain is a new problem.    Original cause of back pain: turning/bending  First noticed back pain: in the last week  Patient feels back pain: constantlyLocation of back pain:  Left lower back and left middle of back  Description of back pain:  sharp  Back pain spreads: left buttocks    Since patient first noticed back pain, pain is: unchanged  Does back pain interfere with her job:  Yes  On a scale of 1-10 (10 being the worst), patient describes pain as:  10  What makes back pain worse: bending, coughing, certain positions, lying down, sitting, standing, stress and twisting  Acupuncture: not tried  Acetaminophen: not helpful  Activity or exercise: not helpful  Chiropractor:  Not tried  Cold: not helpful  Heat: not helpful  Massage: not helpful  Muscle relaxants: not helpful  NSAIDS: not helpful  Opioids: not tried  Physical Therapy: not tried  Rest: not helpful  Steroid Injection: not tried  Stretching: not helpful  Surgery: not tried  TENS unit: not helpful  Topical pain relievers: not tried  Other healthcare providers patient is seeing for back pain: None    Mental Health Follow-up:                    Today's PHQ-9         PHQ-9 Total Score: 16  PHQ-9 Q9 Thoughts of better off dead/self-harm past 2 weeks :   (P) Not at all    How difficult have these problems made it for you to do your work, take care of things at home, or get along with other people: Very difficult        She eats 2-3 servings of fruits and vegetables daily.She consumes 1 sweetened beverage(s) daily.She exercises with enough effort to increase her heart rate 10 to 19 minutes per day.  She exercises with enough effort to increase her heart rate 5 days per week.   She is taking medications regularly.       Pain History:  When did you first notice your pain? - Post-Acute Pain   Have you seen any provider previously for this issue? Yes -   How has your pain affected your ability to work? Unable to work due to pain   What type of work do you or did you do?    Where in your body do you have pain? Back Pain    Accompanying Signs & Symptoms:  Risk of Fracture: None  Risk of Cauda Equina: None  Risk of Infection: None  Risk of Cancer: None  Risk of Ankylosing Spondylitis: Onset  at age <35, male, AND morning back stiffness  no             Exacerbation of chronic intermittent lower back pain  zanaflex  Tylenol, ibuprofen  Heating pad  Injury happened when carrying mattress into garage  Left side and goes across to right side          Review of Systems   Musculoskeletal: Positive for back pain.      Constitutional, HEENT, cardiovascular, pulmonary, gi and gu systems are negative, except as otherwise noted.      Objective    BP (!) 142/87   Pulse 86   Temp 97.8  F (36.6  C) (Oral)   Wt 101.6 kg (224 lb)   LMP 06/07/2018   SpO2 100%   BMI 32.80 kg/m    Body mass index is 32.8 kg/m .  Physical Exam  Vitals reviewed.   Constitutional:       General: She is not in acute distress.     Appearance: Normal appearance. She is well-developed.   HENT:      Head: Normocephalic and atraumatic.      Right Ear: External ear normal.      Left Ear: External ear normal.      Nose: Nose normal.   Eyes:      General: No scleral icterus.     Conjunctiva/sclera: Conjunctivae normal.   Cardiovascular:      Rate and Rhythm: Normal rate.   Pulmonary:      Effort: Pulmonary effort is normal.   Musculoskeletal:         General: No deformity. Normal range of motion.      Cervical back: Normal range of motion.      Comments: Lumbar tenderness, (+)YARON   Skin:     General: Skin is warm and dry.      Findings: No rash.   Neurological:      Mental Status: She is alert and oriented to person, place, and time.   Psychiatric:         Behavior: Behavior normal.         Thought Content: Thought content normal.         Judgment: Judgment normal.

## 2022-04-22 ASSESSMENT — PATIENT HEALTH QUESTIONNAIRE - PHQ9: SUM OF ALL RESPONSES TO PHQ QUESTIONS 1-9: 16

## 2022-04-23 ENCOUNTER — NURSE TRIAGE (OUTPATIENT)
Dept: NURSING | Facility: CLINIC | Age: 52
End: 2022-04-23
Payer: COMMERCIAL

## 2022-04-23 DIAGNOSIS — S39.012A STRAIN OF LUMBAR REGION, INITIAL ENCOUNTER: Primary | ICD-10-CM

## 2022-04-23 NOTE — TELEPHONE ENCOUNTER
Patient calling complaining of continued low back pain from recent lifting/twisting. Patient says she was seen in clinic and says medications and home care (heat/ice) not working. Rates pain 10/10. Patient declines going to urgent care per protocol. Patient concerned with medical bills and would like message sent to her provider.    Moon Taylor RN on 4/23/2022 at 9:45 AM  [facility]  COVID 19 Nurse Triage Plan/Patient Instructions    Please be aware that novel coronavirus (COVID-19) may be circulating in the community. If you develop symptoms such as fever, cough, or SOB or if you have concerns about the presence of another infection including coronavirus (COVID-19), please contact your health care provider or visit [email or web address].     Disposition/Instructions    In-Person Visit with provider recommended. Reference Visit Selection Guide.    Thank you for taking steps to prevent the spread of this virus.  o Limit your contact with others.  o Wear a simple mask to cover your cough.  o Wash your hands well and often.    Resources    [facility]: About COVID-19: [email or web address]    CDC: What to Do If You're Sick: www.cdc.gov/coronavirus/2019-ncov/about/steps-when-sick.html    CDC: Ending Home Isolation: www.cdc.gov/coronavirus/2019-ncov/hcp/disposition-in-home-patients.html     CDC: Caring for Someone: www.cdc.gov/coronavirus/2019-ncov/if-you-are-sick/care-for-someone.html     [facility]: Interim Guidance for Hospital Discharge to Home: www.health.[facility]/diseases/coronavirus/hcp/hospdischarge.pdf    [facility] clinical trials (COVID-19 research studies): [email or web address]     Below are the COVID-19 hotlines at the [place] Department of Health ([facility]). Interpreters are available.   o For health questions: Call 655-107-5784 or 1-542.442.2592 (7 a.m. to 7 p.m.)  o For questions about schools and childcare: Call 486-638-1101 or  4-775-845-6582 (7 a.m. to 7 p.m.)       Reason for Disposition    [1] SEVERE back pain (e.g., excruciating, unable to do any normal activities) AND [2] not improved 2 hours after pain medicine    Additional Information    Negative: Passed out (i.e., lost consciousness, collapsed and was not responding)    Negative: Shock suspected (e.g., cold/pale/clammy skin, too weak to stand, low BP, rapid pulse)    Negative: Sounds like a life-threatening emergency to the triager    Negative: Major injury to the back (e.g., MVA, fall > 10 feet or 3 meters, penetrating injury, etc.)    Negative: Followed a tailbone injury    Negative: [1] Pain in the upper back over the ribs (rib cage) AND [2] radiates (travels, goes) into chest    Negative: [1] Pain in the upper back over the ribs (rib cage) AND [2] worsened by coughing (or clearly increases with breathing)    Negative: Back pain during pregnancy    Negative: Pain mainly in flank (i.e., in the side, over the lower ribs or just below the ribs)    Negative: [1] SEVERE back pain (e.g., excruciating) AND [2] sudden onset AND [3] age > 60    Negative: [1] Loss of bladder or bowel control (urine or bowel incontinence; wetting self, leaking stool) AND [2] new onset    Negative: [1] Unable to urinate (or only a few drops) > 4 hours AND [2] bladder feels very full (e.g., palpable bladder or strong urge to urinate)    Negative: Numbness in groin or rectal area (i.e., loss of sensation)    Negative: [1] SEVERE abdominal pain AND [2] present > 1 hour    Negative: [1] Abdominal pain AND [2] age > 60    Negative: Weakness of a leg or foot (e.g., unable to bear weight, dragging foot)    Negative: Unable to walk    Negative: Patient sounds very sick or weak to the triager    Protocols used: BACK PAIN-A-

## 2022-04-25 ENCOUNTER — TELEPHONE (OUTPATIENT)
Dept: FAMILY MEDICINE | Facility: CLINIC | Age: 52
End: 2022-04-25
Payer: COMMERCIAL

## 2022-04-25 DIAGNOSIS — S39.012A STRAIN OF LUMBAR REGION, INITIAL ENCOUNTER: Primary | ICD-10-CM

## 2022-04-25 RX ORDER — LIDOCAINE 50 MG/G
1 PATCH TOPICAL EVERY 24 HOURS
Qty: 6 PATCH | Refills: 0 | Status: SHIPPED | OUTPATIENT
Start: 2022-04-25 | End: 2022-07-21

## 2022-04-25 NOTE — TELEPHONE ENCOUNTER
Reached out to patient to gather more information. She states that she did not go to work all last week d/t sharp pains in her back. Flexeril is not helping. Inquiring about other options. She states this has been going on since the 15th. PT starts next month. Tried massage chair, stretches, warm packs/cold packs, lidocaine patches, nothing has helped. Routing high priority to Dr. Killian to please review and advise.    Connie Vaughn, GRICELN RN  [facility]

## 2022-04-25 NOTE — TELEPHONE ENCOUNTER
Reason for Call:  Other call back    Detailed comments: Pt called, stating that the cyclobenzaprine (FLEXERIL) 10 MG tablet is not helping her back pain at all. Would like a call to discuss a different medication.    Phone Number Patient can be reached at: Cell number on file:    Telephone Information:   Mobile [phone]       Best Time: any    Can we leave a detailed message on this number? YES    Call taken on 4/25/2022 at 12:52 PM by Nena Sargent

## 2022-04-25 NOTE — TELEPHONE ENCOUNTER
Recommend PT, has neurosurgery appointment, continue naproxen 2 tabs twice daily, flexeril.  Will send in new prescription for lidocaine patch.  Otherwise, ER/UC    Joseph Killian MD

## 2022-04-26 RX ORDER — MELOXICAM 15 MG/1
15 TABLET ORAL DAILY
Qty: 30 TABLET | Refills: 0 | Status: SHIPPED | OUTPATIENT
Start: 2022-04-26 | End: 2022-07-21

## 2022-04-26 NOTE — TELEPHONE ENCOUNTER
OT and/or , will help get back to work.  Meloxicam is an option to be used instead of aleve and ibuprofen.  I'll put these orders in.    Joseph Killian MD

## 2022-04-28 ENCOUNTER — NURSE TRIAGE (OUTPATIENT)
Dept: NURSING | Facility: CLINIC | Age: 52
End: 2022-04-28
Payer: COMMERCIAL

## 2022-04-28 DIAGNOSIS — G89.29 CHRONIC LOW BACK PAIN, UNSPECIFIED BACK PAIN LATERALITY, UNSPECIFIED WHETHER SCIATICA PRESENT: Primary | ICD-10-CM

## 2022-04-28 DIAGNOSIS — M54.50 CHRONIC LOW BACK PAIN, UNSPECIFIED BACK PAIN LATERALITY, UNSPECIFIED WHETHER SCIATICA PRESENT: Primary | ICD-10-CM

## 2022-04-28 NOTE — TELEPHONE ENCOUNTER
Patient has been in ED 2 times for her back pain.  Patient states she has tried all the different techniques given to reduce her pain.  Patient tried muscle relaxers, heat packs, massage chair. Nothing is helping and her pain is not allowing her to dress herself.  She has been out of work for a week.  She needed to go back to pay her bills but she is struggling.  Patient states she starts therapy but not til May 5th.  Patient is requesting some pain medication to help her get through this pain.    Will route message to pcp to follow up with patient.    Isabelle Bliss RN   04/28/22 6:56 PM  [facility] Nurse Advisor    Reason for Disposition    Caller requesting a CONTROLLED substance prescription refill (e.g., narcotics, ADHD medicines)    Additional Information    Negative: Drug overdose and triager unable to answer question    Negative: Caller requesting information unrelated to medicine    Negative: Caller requesting a prescription for Strep throat and has a positive culture result    Negative: Rash while taking a medication or within 3 days of stopping it    Negative: Immunization reaction suspected    Negative: [1] Asthma and [2] having symptoms of asthma (cough, wheezing, etc.)    Negative: [1] Influenza symptoms AND [2] anti-viral med prescription request, such as Tamiflu    Negative: [1] Symptom of illness (e.g., headache, abdominal pain, earache, vomiting) AND [2] more than mild    Negative: MORE THAN A DOUBLE DOSE of a prescription or over-the-counter (OTC) drug    Negative: [1] DOUBLE DOSE (an extra dose or lesser amount) of over-the-counter (OTC) drug AND [2] any symptoms (e.g., dizziness, nausea, pain, sleepiness)    Negative: [1] DOUBLE DOSE (an extra dose or lesser amount) of prescription drug AND [2] any symptoms (e.g., dizziness, nausea, pain, sleepiness)    Negative: Took another person's prescription drug    Negative: [1] DOUBLE DOSE (an extra dose or lesser amount) of prescription drug  "AND [2] NO symptoms (Exception: a double dose of antibiotics)    Negative: Diabetes drug error or overdose (e.g., took wrong type of insulin or took extra dose)    Negative: [1] Request for URGENT new prescription or refill of \"essential\" medication (i.e., likelihood of harm to patient if not taken) AND [2] triager unable to fill per unit policy    Negative: [1] Prescription not at pharmacy AND [2] was prescribed by PCP recently    Negative: [1] Pharmacy calling with prescription questions AND [2] triager unable to answer question    Negative: [1] Caller has URGENT medication question about med that PCP or specialist prescribed AND [2] triager unable to answer question    Negative: [1] Caller has NON-URGENT medication question about med that PCP prescribed AND [2] triager unable to answer question    Negative: [1] Caller requesting a NON-URGENT new prescription or refill AND [2] triager unable to refill per unit policy    Negative: [1] Caller has medication question about med not prescribed by PCP AND [2] triager unable to answer question (e.g., compatibility with other med, storage)    Protocols used: MEDICATION QUESTION CALL-A-      "

## 2022-04-29 NOTE — TELEPHONE ENCOUNTER
Francesca [facility] routing to PCP clinic -patient has not been seen by provider: Agustín Castañeda or other provider at this clinic.       Beatris Gates, RN on 4/29/2022 at 7:22 AM Francesca Joseph

## 2022-04-29 NOTE — TELEPHONE ENCOUNTER
Pt saw Dr. Killian not Dr. Castañeda for back pain (although Dr. Castañeda is listed as PCP which is why it came to her basket).    Will route to Dr. Killian to address.    Rosi DOWLING RN, BSN  [facility]

## 2022-05-02 NOTE — TELEPHONE ENCOUNTER
I'll order an MRI of her lower back.  Referral to pain specialist and spine specialist.  I won't prescribe anything stronger than meloxicam and muscle relaxer.    Joseph Killian MD

## 2022-05-04 ENCOUNTER — TELEPHONE (OUTPATIENT)
Dept: PALLIATIVE MEDICINE | Facility: CLINIC | Age: 52
End: 2022-05-04
Payer: COMMERCIAL

## 2022-05-04 NOTE — TELEPHONE ENCOUNTER

## 2022-05-04 NOTE — LETTER
May 4, 2022    Liane Key  [address]  [place] [address]    Dear Liane,        Welcome to the [facility].  We are located on the 2nd floor ([address]) of the [facility], located at [address] [place] [address] [place] [address].  Your appointment has been scheduled on 6/1/2022 at 8:00a with Deena Ordaz NP.    At your first visit, you will meet your team of caregivers who will help you to develop pain management strategies that will last a lifetime. You will meet with our support staff to review your insurance information, and collect your co-payment if required by your insurance company. You will also meet with a medical pain specialist and care coordinator who will assess your pain and develop a plan of care for your successful pain rehabilitation. You should expect to spend approximately 1 hour at your first visit with us. Usually, patients work with us for a period of 6-12 months, and eventually return to their primary doctor once their pain management has stabilized.      To help us make your visit go as smoothly as possible, please bring the following items with you on your visit:       Completed Pain Questionnaire enclosed in this packet.  If you do not bring the completed questionnaire, we may have to reschedule your appointment.    List of any medicines that you are currently taking or have been prescribed    Important NON-[place] medical information such as medical records or tests results (X-rays, or laboratory tests)    Your health insurance card    Financial resources to cover your co-payment or balance due at the time of service (cash, personal check, Visa, and MasterCard are acceptable methods of payment)     Due to the demand for new patient evaluations, you must notify the scheduling department 48 hours (2 days) in advance if you are not able to keep this appointment. Failure to do so could affect your ability to reschedule with our clinic.  Please be aware that we will not prescribe any medications at your first visit.     Please call [phone] with any questions regarding your appointment. We look forward to meeting you and working to address your health care needs.     Sincerely,      [facility]

## 2022-05-05 ENCOUNTER — OFFICE VISIT (OUTPATIENT)
Dept: NEUROSURGERY | Facility: CLINIC | Age: 52
End: 2022-05-05
Attending: FAMILY MEDICINE
Payer: COMMERCIAL

## 2022-05-05 VITALS — SYSTOLIC BLOOD PRESSURE: 128 MMHG | HEART RATE: 100 BPM | OXYGEN SATURATION: 96 % | DIASTOLIC BLOOD PRESSURE: 80 MMHG

## 2022-05-05 DIAGNOSIS — M54.50 ACUTE MIDLINE LOW BACK PAIN WITHOUT SCIATICA: Primary | ICD-10-CM

## 2022-05-05 PROCEDURE — 99244 OFF/OP CNSLTJ NEW/EST MOD 40: CPT | Performed by: NURSE PRACTITIONER

## 2022-05-05 RX ORDER — METHYLPREDNISOLONE 4 MG
TABLET, DOSE PACK ORAL
Qty: 21 TABLET | Refills: 0 | Status: SHIPPED | OUTPATIENT
Start: 2022-05-05 | End: 2022-06-01

## 2022-05-05 ASSESSMENT — PAIN SCALES - GENERAL: PAINLEVEL: SEVERE PAIN (7)

## 2022-05-05 NOTE — NURSING NOTE
"Liane Key is a 51 year old female who presents for:  Chief Complaint   Patient presents with     Consult     Left sided low back & SI joint pain        Initial Vitals:  /80   Pulse 100   LMP 06/07/2018   SpO2 96%  Estimated body mass index is 32.8 kg/m  as calculated from the following:    Height as of 3/14/22: 5' 9.29\" (1.76 m).    Weight as of 4/21/22: 224 lb (101.6 kg).. There is no height or weight on file to calculate BSA. BP completed using cuff size: large  Severe Pain (7)    Nursing Comments:     Lion Rainey MA    "

## 2022-05-05 NOTE — PROGRESS NOTES
Dr. Manuel No   [facility] Neurosurgery Clinic Visit      CC: low back pain     Primary Care Provider: No Ref-Primary, Physician    Reason For Visit:   I was asked by Dr. Yi to consult on the patient for: low back pain      HPI: Liane Key is a 51 year old female who presents for evaluation of acute low back pain. Symptoms started on 4/15/22 after moving a heavy mattress. Today, patient reports mid to left sided low back pain. She reports new numbness in her feet as well. Denies radicular pain or weakness. Describes the pain as aching and throbbing. Pain is worsened with standing and laying flat. She has difficulty sleeping due to pain. Patient has tried heat, ice, NSAIDS, Tylenol, Flexeril, massage, and stretching. Denies any falls, foot drop, saddle anesthesia, or bladder/bowel incontinence.     Current pain: 7/10   At worst: 10/10    Past Medical History:   Diagnosis Date     Graves disease      Hypothyroidism following radioiodine therapy      Obesity      Syphilis 5/8/2015       Past Medical History reviewed with patient during visit.    Past Surgical History:   Procedure Laterality Date     CHOLECYSTECTOMY  1998          HYSTERECTOMY, PAP NO LONGER INDICATED  07/13/2018     LAPAROSCOPIC ASSISTED HYSTERECTOMY VAGINAL  07/13/2018    adenomyosis, fibroids      OPEN REDUCTION INTERNAL FIXATION ANKLE Left 1990          TUBAL LIGATION  1990     Past Surgical History reviewed with patient during visit.    Current Outpatient Medications   Medication     methylPREDNISolone (MEDROL DOSEPAK) 4 MG tablet therapy pack     amitriptyline (ELAVIL) 50 MG tablet     benzonatate (TESSALON) 200 MG capsule     cyclobenzaprine (FLEXERIL) 10 MG tablet     ferrous sulfate (IRON) 325 (65 Fe) MG tablet     ibuprofen (ADVIL/MOTRIN) 800 MG tablet     AIDE COVID-19 VACCINE 0.5 ML injection     levothyroxine (SYNTHROID/LEVOTHROID) 125 MCG tablet     lidocaine (LIDODERM) 5 % patch     meloxicam (MOBIC) 15 MG tablet      naproxen (NAPROSYN) 500 MG tablet     Prenatal MV-Min-Fe Fum-FA-DHA (PRENATAL 1 PO)     tiZANidine (ZANAFLEX) 4 MG tablet     zolpidem (AMBIEN) 5 MG tablet     No current facility-administered medications for this visit.       No Known Allergies    Social History     Socioeconomic History     Marital status:      Spouse name: None     Number of children: 4     Years of education: None     Highest education level: None   Occupational History     Occupation: works with disabled      Employer: [employer]   Tobacco Use     Smoking status: Former Smoker     Packs/day: 0.20     Years: 5.00     Pack years: 1.00     Types: Cigarettes     Quit date: 2012     Years since quittin.9     Smokeless tobacco: Never Used   Vaping Use     Vaping Use: Never used   Substance and Sexual Activity     Alcohol use: Yes     Comment: nightly one     Drug use: Not Currently     Types: Marijuana     Sexual activity: Yes     Partners: Male     Birth control/protection: Female Surgical, Pill   Other Topics Concern     Parent/sibling w/ CABG, MI or angioplasty before 65F 55M? No   Social History Narrative    Lives with her 2 grand kids at home.        Family History   Problem Relation Age of Onset     Unknown/Adopted Maternal Grandmother      Unknown/Adopted Maternal Grandfather      Unknown/Adopted Paternal Grandmother      Unknown/Adopted Paternal Grandfather      Depression Sister      Thyroid Disease Maternal Aunt      Cancer No family hx of      Diabetes No family hx of      Hypertension No family hx of      Cerebrovascular Disease No family hx of      Glaucoma No family hx of      Macular Degeneration No family hx of        ROS: 10 point ROS neg other than the symptoms noted above in the HPI.    Vital Signs: /80   Pulse 100   LMP 2018   SpO2 96%     Physical Examination:  Constitutional:  Alert, well nourished, NAD.  HEENT: Normocephalic, atraumatic.   Pulmonary:  Without shortness of breath,  normal effort.   Cardiovascular:  No pitting edema of BLE.      Neurological:  Awake  Alert  Oriented x 3  Speech clear  Cranial nerves II - XII grossly intact  PERRL  EOMI  Motor exam:  Iliopsoas  (hip flexion)               Right: 5/5  Left:  5/5  Quadriceps  (knee extension)       Right:  5/5  Left:  5/5  Hamstrings  (knee flexion)            Right:  5/5  Left:  5/5  Gastroc Soleus  (PF)                          Right:  5/5  Left:  5/5  Tibialis Ant  (DF)                          Right:  5/5  Left:  5/5  EHL                          Right:  5/5  Left:  5/5         Sensation normal to BLE    Reflexes are 2+ in the patellar and Achilles. There is no clonus.    Musculoskeletal:  Gait: Able to stand from a seated position. Normal non-antalgic, non-myelopathic gait. Able to heel/toe walk without loss of balance.    No tenderness of the spine or paraspinous muscles.  Hip height is symmetrical.  Negative SI joint tenderness bilaterally.  Negative straight leg raise bilaterally.      Imaging:   XR LUMBAR SPINE 2-3 VIEWS 4/21/2022 10:13 AM   IMPRESSION: Normal vertebral body heights and alignment. RUQ surgical clips.    Assessment/Plan:   51 year old female who presents for evaluation of acute low back pain. Patient has tried various conservative measures at home, but symptoms persist. XR reviewed with patient. Recommend lumbar spine MRI and Medrol Dosepak. Will call with MRI results and next steps. Patient was previously referred to PT but has not scheduled yet. Provided patient with PT scheduling information.     Advised patient to call our clinic with any questions or concerns. Discussed red flag symptoms and advised to seek medical attention if these develop. Patient voiced understanding and agreement.      Yoselyn Leonard CNP  [facility] Neurosurgery  [address] [address]  Tel [phone]  Pager [phone]

## 2022-05-05 NOTE — LETTER
5/5/2022         RE: Liane Key  [address]  [place] [address]        Dear Colleague,    Thank you for referring your patient, Liane Key, [facility]. Please see a copy of my visit note below.    Dr. Manuel No   [facility] Neurosurgery Clinic Visit      CC: low back pain     Primary Care Provider: No Ref-Primary, Physician    Reason For Visit:   I was asked by Dr. Yi to consult on the patient for: low back pain      HPI: Liane Key is a 51 year old female who presents for evaluation of acute low back pain. Symptoms started on 4/15/22 after moving a heavy mattress. Today, patient reports mid to left sided low back pain. She reports new numbness in her feet as well. Denies radicular pain or weakness. Describes the pain as aching and throbbing. Pain is worsened with standing and laying flat. She has difficulty sleeping due to pain. Patient has tried heat, ice, NSAIDS, Tylenol, Flexeril, massage, and stretching. Denies any falls, foot drop, saddle anesthesia, or bladder/bowel incontinence.     Current pain: 7/10   At worst: 10/10    Past Medical History:   Diagnosis Date     Graves disease      Hypothyroidism following radioiodine therapy      Obesity      Syphilis 5/8/2015       Past Medical History reviewed with patient during visit.    Past Surgical History:   Procedure Laterality Date     CHOLECYSTECTOMY  1998          HYSTERECTOMY, PAP NO LONGER INDICATED  07/13/2018     LAPAROSCOPIC ASSISTED HYSTERECTOMY VAGINAL  07/13/2018    adenomyosis, fibroids      OPEN REDUCTION INTERNAL FIXATION ANKLE Left 1990          TUBAL LIGATION  1990     Past Surgical History reviewed with patient during visit.    Current Outpatient Medications   Medication     methylPREDNISolone (MEDROL DOSEPAK) 4 MG tablet therapy pack     amitriptyline (ELAVIL) 50 MG tablet     benzonatate (TESSALON) 200 MG capsule     cyclobenzaprine (FLEXERIL) 10 MG tablet      ferrous sulfate (IRON) 325 (65 Fe) MG tablet     ibuprofen (ADVIL/MOTRIN) 800 MG tablet     [employer] COVID-19 VACCINE 0.5 ML injection     levothyroxine (SYNTHROID/LEVOTHROID) 125 MCG tablet     lidocaine (LIDODERM) 5 % patch     meloxicam (MOBIC) 15 MG tablet     naproxen (NAPROSYN) 500 MG tablet     Prenatal MV-Min-Fe Fum-FA-DHA (PRENATAL 1 PO)     tiZANidine (ZANAFLEX) 4 MG tablet     zolpidem (AMBIEN) 5 MG tablet     No current facility-administered medications for this visit.       No Known Allergies    Social History     Socioeconomic History     Marital status:      Spouse name: None     Number of children: 4     Years of education: None     Highest education level: None   Occupational History     Occupation: works with [employer]      Employer: [employer]   Tobacco Use     Smoking status: Former Smoker     Packs/day: 0.20     Years: 5.00     Pack years: 1.00     Types: Cigarettes     Quit date: 2012     Years since quittin.9     Smokeless tobacco: Never Used   Vaping Use     Vaping Use: Never used   Substance and Sexual Activity     Alcohol use: Yes     Comment: nightly one     Drug use: Not Currently     Types: Marijuana     Sexual activity: Yes     Partners: Male     Birth control/protection: Female Surgical, Pill   Other Topics Concern     Parent/sibling w/ CABG, MI or angioplasty before 65F 55M? No   Social History Narrative    Lives with her 2 grand kids at home.        Family History   Problem Relation Age of Onset     Unknown/Adopted Maternal Grandmother      Unknown/Adopted Maternal Grandfather      Unknown/Adopted Paternal Grandmother      Unknown/Adopted Paternal Grandfather      Depression Sister      Thyroid Disease Maternal Aunt      Cancer No family hx of      Diabetes No family hx of      Hypertension No family hx of      Cerebrovascular Disease No family hx of      Glaucoma No family hx of      Macular Degeneration No family hx of        ROS: 10 point ROS neg other  than the symptoms noted above in the HPI.    Vital Signs: /80   Pulse 100   LMP 06/07/2018   SpO2 96%     Physical Examination:  Constitutional:  Alert, well nourished, NAD.  HEENT: Normocephalic, atraumatic.   Pulmonary:  Without shortness of breath, normal effort.   Cardiovascular:  No pitting edema of BLE.      Neurological:  Awake  Alert  Oriented x 3  Speech clear  Cranial nerves II - XII grossly intact  PERRL  EOMI  Motor exam:  Iliopsoas  (hip flexion)               Right: 5/5  Left:  5/5  Quadriceps  (knee extension)       Right:  5/5  Left:  5/5  Hamstrings  (knee flexion)            Right:  5/5  Left:  5/5  Gastroc Soleus  (PF)                          Right:  5/5  Left:  5/5  Tibialis Ant  (DF)                          Right:  5/5  Left:  5/5  EHL                          Right:  5/5  Left:  5/5         Sensation normal to BLE    Reflexes are 2+ in the patellar and Achilles. There is no clonus.    Musculoskeletal:  Gait: Able to stand from a seated position. Normal non-antalgic, non-myelopathic gait. Able to heel/toe walk without loss of balance.    No tenderness of the spine or paraspinous muscles.  Hip height is symmetrical.  Negative SI joint tenderness bilaterally.  Negative straight leg raise bilaterally.      Imaging:   XR LUMBAR SPINE 2-3 VIEWS 4/21/2022 10:13 AM   IMPRESSION: Normal vertebral body heights and alignment. RUQ surgical clips.    Assessment/Plan:   51 year old female who presents for evaluation of acute low back pain. Patient has tried various conservative measures at home, but symptoms persist. XR reviewed with patient. Recommend lumbar spine MRI and Medrol Dosepak. Will call with MRI results and next steps. Patient was previously referred to PT but has not scheduled yet. Provided patient with PT scheduling information.     Advised patient to call our clinic with any questions or concerns. Discussed red flag symptoms and advised to seek medical attention if these develop.  Patient voiced understanding and agreement.      Yoselyn Leonard, CNP  [facility]  [address] [address]  Tel [phone]  Pager [phone]          Again, thank you for allowing me to participate in the care of your patient.        Sincerely,        Yoselyn Leonard, NP

## 2022-05-05 NOTE — PATIENT INSTRUCTIONS
Order for lumbar spine MRI. You can call to schedule at [phone]. I will call with the results and next steps.     Order for medrol dosepak (steroid pain medication). Sent to pharmacy.     You were previously referred to physical therapy. You can call to schedule at [phone].     Please call our clinic if symptoms persist, change, or worsen at any time.    [facility]surgery  [address] [address]  Tel [phone]

## 2022-05-18 ENCOUNTER — ANCILLARY PROCEDURE (OUTPATIENT)
Dept: MRI IMAGING | Facility: CLINIC | Age: 52
End: 2022-05-18
Attending: NURSE PRACTITIONER
Payer: COMMERCIAL

## 2022-05-18 DIAGNOSIS — M54.50 ACUTE MIDLINE LOW BACK PAIN WITHOUT SCIATICA: ICD-10-CM

## 2022-05-18 PROCEDURE — 72148 MRI LUMBAR SPINE W/O DYE: CPT | Mod: TC | Performed by: RADIOLOGY

## 2022-05-19 ENCOUNTER — TELEPHONE (OUTPATIENT)
Dept: NEUROSURGERY | Facility: CLINIC | Age: 52
End: 2022-05-19
Payer: COMMERCIAL

## 2022-05-19 DIAGNOSIS — M54.50 ACUTE MIDLINE LOW BACK PAIN WITHOUT SCIATICA: Primary | ICD-10-CM

## 2022-05-19 NOTE — TELEPHONE ENCOUNTER
Yoselyn [place], CNP reviewed lumbar MRI taht was completed 5/18/22.    Per Yoselyn: MRI shows mild degenerative changes at L5-S1.         Recommend PT for low back pain and referral to Dr. Sam Escobedo for SI joint evaluation.    Can refer to  Pain Clinic for comprehensive pain management as well.    Called and reviewed above with patient. Looks like is she is already scheduled with [facility] for Comprehensive pain care on 6/1/22. Patient would like to hold off on PT referral since she will be meeting with pain clinic in a few weeks and would prefer to go with them for PT. Patient was ok to proceed with referral to Dr. Escobedo. Referral placed. Patient was inquiring about medication for her pain stronger than OTC. Advised she contact her PCP. She verbalized understanding.  Advised patient to contact clinic if any further questions.

## 2022-06-01 ENCOUNTER — OFFICE VISIT (OUTPATIENT)
Dept: PALLIATIVE MEDICINE | Facility: CLINIC | Age: 52
End: 2022-06-01
Attending: FAMILY MEDICINE
Payer: COMMERCIAL

## 2022-06-01 VITALS — HEART RATE: 82 BPM | SYSTOLIC BLOOD PRESSURE: 130 MMHG | DIASTOLIC BLOOD PRESSURE: 86 MMHG

## 2022-06-01 DIAGNOSIS — M54.50 CHRONIC LEFT-SIDED LOW BACK PAIN WITHOUT SCIATICA: Primary | ICD-10-CM

## 2022-06-01 DIAGNOSIS — M53.3 PAIN OF BOTH SACROILIAC JOINTS: ICD-10-CM

## 2022-06-01 DIAGNOSIS — G89.29 CHRONIC LEFT-SIDED LOW BACK PAIN WITHOUT SCIATICA: Primary | ICD-10-CM

## 2022-06-01 DIAGNOSIS — M54.59 LUMBAR FACET JOINT PAIN: ICD-10-CM

## 2022-06-01 DIAGNOSIS — M53.3 SACROILIAC JOINT DYSFUNCTION OF BOTH SIDES: ICD-10-CM

## 2022-06-01 PROCEDURE — 99205 OFFICE O/P NEW HI 60 MIN: CPT | Performed by: NURSE PRACTITIONER

## 2022-06-01 RX ORDER — GABAPENTIN 300 MG/1
CAPSULE ORAL
Qty: 120 CAPSULE | Refills: 0 | Status: SHIPPED | OUTPATIENT
Start: 2022-06-01 | End: 2022-06-09 | Stop reason: SINTOL

## 2022-06-01 ASSESSMENT — PAIN SCALES - GENERAL: PAINLEVEL: MODERATE PAIN (5)

## 2022-06-01 NOTE — PATIENT INSTRUCTIONS
PLAN:  Physical Therapy: ordered PHYSICAL THERAPY   Clinical Health Psychologist to address issues of relaxation, behavioral change, coping style, and other factors important to improvement: none  Diagnostic Studies: none  Medication Management:   I don't recommend opiate medication for this chronic pain  START gabapentin 300mg capsules. Take 300mg at bedtime for 7 days, then take 300mg twice daily for 7 days, then take 300mg three times daily for 7 days, then take 300mg in the morning and afternoon and 600mg (2 capsules) at bedtime. If side effects, reduce to last tolerable dosage.   Do not take meloxicam AND ibuprofen or Naproxen. Choose ONE of these (the one that is the most helpful) using more than one of these meds can increase risk for stomach bleeding and reduced kidney function.   Further procedures recommended: none   Could consider lumbar facet joint steroid injections, handout given  Acupuncture: none  Urine toxicology screen today: none   Recommendations/follow-up for PCP:  See above  Release of information: none  Follow up: 4-6 weeks in-person. Please call [phone] to make your follow-up appointment with me.     ----------------------------------------------------------------  Clinic Number:  [phone]   Call with any questions about your care and for scheduling assistance.   Calls are returned Monday through Friday between 8 AM and 4:30 PM. We usually get back to you within 2 business days depending on the issue/request.    If we are prescribing your medications:  For opioid medication refills, call the clinic or send a [employer] message 7 days in advance.  Please include:  Name of requested medication  Name of the pharmacy.  For non-opioid medications, call your pharmacy directly to request a refill. Please allow 3-4 days to be processed.   Per [place] State Law:  All controlled substance prescriptions must be filled within 30 days of being written.    For those controlled substances allowing refills,  pickup must occur within 30 days of last fill.      We believe regular attendance is key to your success in our program!    Any time you are unable to keep your appointment we ask that you call us at least 24 hours in advance to cancel.This will allow us to offer the appointment time to another patient.   Multiple missed appointments may lead to dismissal from the clinic.

## 2022-06-01 NOTE — PROGRESS NOTES
"  [facility] Consultation    Date of visit: 6/1/2022      PEG Score 6/1/2022   PEG Total Score 8.67          Reason for consultation:    Liane Key is a 52 year old female who is seen in consultation today at the request of her provider, Dr. Joseph Yi (Primary Care Provider)  re: patient's chronic low back pain, XRs normal, MRI pending      Primary Care Provider is No Ref-Primary, Physician.  Pain medications are being prescribed by review of [place]  shows no opiates in previous 12 months.     Please see the [facility] health questionnaire which the patient completed and reviewed with me in detail.    Chief Complaint:  Low back pain, no radiation  Chief Complaint   Patient presents with     Pain       Pain history:  Liane Key is a 52 year old female who first started having problems with pain as follows:     Left sided low back pain extends to center of low back. No radiation into the legs. Started on 4/17/2022 with moving bedroom furniture. Had sudden sharp pain in the back. Her pain has gotten slightly better since then, but not much.  MedrolDosePak did not reduce pain at all. Denies any weakness in her legs. No loss of b/b control.  Pain is worse with standing and walking. Having trouble sleeping. She usually sleeps on her stomach, but her pain is worse if she tries this. She has been trying to sleep on her sides but she gets numbness on each of the lateral hips and needs to keep flipping from side to side all night.     Pain rating: intensity ranges from 5/10 to 10/10, and Averages depends on activity on a 0-10 scale.  Describes pain as \"aching, sharp and shooting.\"  Pain is constant.      Home self care includes: heating pad, massages, massage chair, patches, ibuprofen, tylenol, gentle stretches and short exercise sessions up to 4 times per week    Aggravating factors include: standing up, bending down, putting on my shoes, laying on my stomach. " Ice makes pain worse    Relieving factors include: nothing really helps. Heating pad helps a little bit. Rests      Any bowel or bladder incontinence: none      Current pain-related medication treatments include:  -amitriptyline 50mg every day (not helpful)  -cyclobenzaprine 10mg TID PRN muscle spasms (not helpful)  -Ibuprofen 800mg Q 8 hours PRN moderate pain (tiny edge off of the pain)  -Lidoderm patch PRN (not helpful)  -meloxicam 15mg every day   -Medrol DosePak prescribed on 5/5/2022 (not helpful)  -tizandine 4mg TID PRN muscle spasms (feels lightheaded)    Other pertinent medications:  -none    Previous medication treatments included:  OPIATES: oxycodone (helpful after surgery)  NSAIDS: Ibuprofen (very minimal relief), Meloxicam (unsure), Naprosyn (unsure)  MUSCLE RELAXANTS: Flexeril (not helpful), tizandine (causes lightheadedness)  ANTI-MIGRAINE MEDS: none  ANTI-DEPRESSANTS: none  SLEEP AIDS: amitriptyline (not very helpful)  ANTI-CONVULSANTS: none  TOPICALS: Lidocaine patches (not very helpful)  ANXIOLYTICS: none  MEDICAL CANNABIS: none  Other meds: Tylenol (somewhat helpful)      Other treatments have included:  Liane Key has not been seen at a pain clinic in the past.    PT: tried, somewhat helpful  Chiropractic care: tried in the past, felt good afterwards  Acupuncture: none  Massage: does this, helpful  TENs Unit: none    Injections:   none    Past Medical History:  Past Medical History:   Diagnosis Date     Graves disease      Hypothyroidism following radioiodine therapy      Obesity      Syphilis 5/8/2015     Past Surgical History:  Past Surgical History:   Procedure Laterality Date     CHOLECYSTECTOMY  1998          HYSTERECTOMY, PAP NO LONGER INDICATED  07/13/2018     LAPAROSCOPIC ASSISTED HYSTERECTOMY VAGINAL  07/13/2018    adenomyosis, fibroids      OPEN REDUCTION INTERNAL FIXATION ANKLE Left 1990          TUBAL LIGATION  1990     Medications:  Current Outpatient Medications   Medication Sig  Dispense Refill     amitriptyline (ELAVIL) 50 MG tablet Take 1 tablet (50 mg) by mouth At Bedtime +++NEED ANNUAL EXAM+++ 90 tablet 0     cyclobenzaprine (FLEXERIL) 10 MG tablet Take 1 tablet (10 mg) by mouth 3 times daily as needed for muscle spasms 30 tablet 2     ferrous sulfate (IRON) 325 (65 Fe) MG tablet Take by mouth daily (with breakfast)       ibuprofen (ADVIL/MOTRIN) 800 MG tablet Take 1 tablet (800 mg) by mouth every 8 hours as needed for moderate pain 30 tablet 0     AIDE COVID-19 VACCINE 0.5 ML injection        levothyroxine (SYNTHROID/LEVOTHROID) 125 MCG tablet Take 1 tablet (125 mcg) by mouth daily 90 tablet 1     lidocaine (LIDODERM) 5 % patch Place 1 patch onto the skin every 24 hours To prevent lidocaine toxicity, patient should be patch free for 12 hrs daily. 6 patch 0     meloxicam (MOBIC) 15 MG tablet Take 1 tablet (15 mg) by mouth daily 30 tablet 0     methylPREDNISolone (MEDROL DOSEPAK) 4 MG tablet therapy pack Follow Package Directions 21 tablet 0     naproxen (NAPROSYN) 500 MG tablet Take 1 tablet (500 mg) by mouth 2 times daily (with meals) 30 tablet 0     Prenatal MV-Min-Fe Fum-FA-DHA (PRENATAL 1 PO) Take 1 tablet by mouth daily       tiZANidine (ZANAFLEX) 4 MG tablet Take 1 tablet (4 mg) by mouth 3 times daily as needed for muscle spasms 30 tablet 0     zolpidem (AMBIEN) 5 MG tablet Take tablet by mouth 15 minutes prior to sleep, for Sleep Study 1 tablet 0     benzonatate (TESSALON) 200 MG capsule Take 1 capsule (200 mg) by mouth 3 times daily as needed for cough (Patient not taking: No sig reported) 60 capsule 1     Allergies:   No Known Allergies     Social History:  Home situation: , lives with grandson (high school)  and granddaughter (elementary)  Occupation/Schooling: works at [employer] [place], works with the food program with disabled individuals  Tobacco use: none  Alcohol use: occasionally  Drug use: none  History of chemical dependency treatment:  none    Family history:  Family History   Problem Relation Age of Onset     Unknown/Adopted Maternal Grandmother      Unknown/Adopted Maternal Grandfather      Unknown/Adopted Paternal Grandmother      Unknown/Adopted Paternal Grandfather      Depression Sister      Thyroid Disease Maternal Aunt      Cancer No family hx of      Diabetes No family hx of      Hypertension No family hx of      Cerebrovascular Disease No family hx of      Glaucoma No family hx of      Macular Degeneration No family hx of          Review of Systems:  The 14 system ROS was reviewed with the patient and is positive for: positives are in bold  Constitutional: fever/chills, fatigue, weight gain, weight loss  Eyes/Head: headache, dizziness  ENT: ringing in ears  Allergy/Immune: allergies  Skin: itching, rash, hives  Hematologic: easy bruising  Respiratory: cough, wheezing, shortness of breath  Cardiovascular: swelling in feet, fainting, palpitations, chest pain  GI: abdominal pain, nausea, vomiting, diarrhea, constipation  Endocrine: steroid use (Medrol DosePak)  Musculoskeletal:  joint pain, arthritis, stiffness, gout, back pain, neck pain  Urinary: frequency, urgency, incontinence, hesitancy  Neurologic: weakness, numbness/tingling, seizure, stroke, memory loss  Mental health: depression, anxiety, stress, suicidal ideation      Physical Exam:  Vitals:    06/01/22 0805   BP: 130/86   Pulse: 82     Exam:  Constitutional: healthy, alert and no distress  Head: normocephalic. Atraumatic.   Eyes: no redness or jaundice noted   ENT: oropharnx normal.  MMM.  Cardiovascular: RRR no m/g/r   Respiratory: clear to auscultation A/P. Respirations easy and unlabored. Able to speak in full sentences without SOB or cough noted.    Gastrointestinal: soft, non-tender   : deferred  Skin: no suspicious lesions or rashes  Psychiatric: mentation appears normal and affect normal/bright    Musculoskeletal exam:  Gait/Station/Posture: fair posture. Normal gait.  Able to rise onto toes and heels. Able to perform tandem gait    Cervical spine:    Flex:  20 degrees   Ext: 20 degrees   Rotation to right: 90 degrees   Rotation to left: 90 degrees   Ext/rotation to the right pain free   Ext/rotation to the left pain free    Thoracic spine:  Normal     Lumbar spine:    Flex:  80 degrees   Ext: 20 degrees   Rotation/ext to right: painful    Rotation/ext to left: painful    SI joints: bilaterally tender   Piriformis: Non-tender bilaterally   Greater trochanters: Non-tender bilaterally    Myofascial tenderness:  none  Straight leg exam: negative  Gino's maneuver: positive bilaterally    Neurologic exam:  CN:  Cranial nerves 2-12 are  Grossly normal  Motor:  5/5 UE and LE strength  Reflexes:     Biceps:     R:  2/4 L: 2/4   Brachioradialis   R:  2/4 L: 2/4      Patella:  R:  2/4 L: 2/4   Achilles:  R:  2/4 L: 2/4  Other reflexes:     Ochoa's negative  Sensory:  (upper and lower extremities):   Light touch: normal    Vibration: normal    Pin prick: normal    Allodynia: absent    Dysethesia: absent    Hyperalgesia: absent     Diagnostic tests:  XR LUMBAR SPINE 2-3 VIEWS 4/21/2022 10:13 AM      COMPARISON: 6/14/2017     HISTORY: Strain of lumbar region, initial encounter                                                                      IMPRESSION: Normal vertebral body heights and alignment. RUQ surgical  clips.     SOTO AKHTAR MD      EXAM: PELVIS ONE TO TWO VIEWS  DATE/TIME: 4/21/2022 10:13 AM      INDICATION: Sacroiliac joint pain.  COMPARISON: None.                                                                      IMPRESSION: Normal joint spacing and alignment.  No fracture.     CLINT LOZOYA MD       MR LUMBAR SPINE WITHOUT CONTRAST  5/18/2022 12:19 PM     INDICATION: Low back pain, more than six weeks. Acute midline low back  pain without sciatica.     TECHNIQUE: MRI images of the lumbar spine without contrast.  CONTRAST:  None.     COMPARISON: Lumbar spine  radiograph 4/21/2022.     FINDINGS: Nomenclature is based on 5 lumbar type vertebral bodies.  Normal vertebral body heights. Normal alignment.  No marrow edema. No  pars defect. The conus tip is identified at L2. Right renal cyst.     T12-L1: Mild loss of disc signal. Normal disc height. No herniation.  Unremarkable facets. No stenosis.         L1-L2: Mild loss of disc signal. Normal disc height. No herniation.  Unremarkable facets. No stenosis.     L2-L3: Slight loss of disc signal. Normal disc height. No herniation.  Unremarkable facets. No stenosis.     L3-L4: Slight loss of disc height and signal. Tiny foraminal  protrusions. Minor facet arthropathy. No stenosis.     L4-L5: Slight loss of disc signal. Normal disc height. No herniation.  Mild facet arthropathy. No stenosis.     L5-S1: Moderate loss of disc signal. Mild loss of disc height. Central  posterior high intensity zone and small protrusion. Mild facet  arthropathy. Slight lateral recess narrowing without traversing nerve  root impingement. Mild foraminal narrowing.                                                                      IMPRESSION:  1.  Generally mild degenerative changes in the lumbar spine, most  pronounced at L5-S1 where there is mild lateral recess and foraminal  narrowing without nerve root impingement.  2.  Minor degenerative changes above this.     SHAWN BURROUGHS MD      Other testing (labs, diagnostics):  None recent within [facility]      Screening tools:     DIRE Score for ongoing opioid management is calculated as follows:    Diagnosis = 2    Intractability = 1    Risk: Psych = 2  Chem Hlth = 2  Reliability = 3  Social = 3    Efficacy = 2    Total DIRE Score = 15 (14 or higher predicts good candidate for ongoing opioid management; 13 or lower predicts poor candidate for opioid management)         Assessment:  1. Chronic left-sided low back pain without sciatica  2. Lumbar facet joint pain  3. Bilateral sacroiliac joint pain  4. SI  joint dysfunction bilaterally  5. PMHx includes: Graves disease, hypothyroidism following radioiodine therapy, obesity, syphilis (2015)  6. PSHx includes: Cholecystectomy (1998), hysterectomy (2018), open reduction internal fixation left ankle fracture (1990), tubal ligation (1990).        Plan:  Diagnosis reviewed, treatment option addressed, and risk/benefits discussed.  Self-care instructions given.  I am recommending a multidisciplinary treatment plan to help this patient better manage her pain.      1. Physical Therapy: ordered PHYSICAL THERAPY   2. Clinical Health Psychologist to address issues of relaxation, behavioral change, coping style, and other factors important to improvement: none  3. Diagnostic Studies: none  4. Medication Management:   1. I don't recommend opiate medication for this chronic pain  2. START gabapentin 300mg capsules. Take 300mg at bedtime for 7 days, then take 300mg twice daily for 7 days, then take 300mg three times daily for 7 days, then take 300mg in the morning and afternoon and 600mg (2 capsules) at bedtime. If side effects, reduce to last tolerable dosage.   5. Further procedures recommended: none   1. Could consider lumbar facet joint steroid injections, handout given  6. Acupuncture: none  7. Urine toxicology screen today: none   8. Recommendations/follow-up for PCP:  See above  9. Release of information: none  10. Follow up: 4-6 weeks in-person. Please call [phone] to make your follow-up appointment with me.       Face to face time: 62 minutes      Deena NEAL RN CNP, FNP  [facility]  [facility] [place]

## 2022-06-06 ENCOUNTER — TELEPHONE (OUTPATIENT)
Dept: PALLIATIVE MEDICINE | Facility: CLINIC | Age: 52
End: 2022-06-06
Payer: COMMERCIAL

## 2022-06-06 NOTE — TELEPHONE ENCOUNTER
patient called and stated that the gabapentin (NEURONTIN) 300 MG capsule is not working.        Please review       Shayy Balderas    [facility]

## 2022-06-07 NOTE — TELEPHONE ENCOUNTER
Patient has been seen once by Deena Ordaz on 06/01/22. She has not been on the Gabapentin long enough to have titrated up to a therapeutic dose.     Spoke to patient and she reports that she has been taking 1 cap at bedtime for 7 days and she is feeling very light headed in the morning and finding it difficult to work or drive. She believes she cant continue to increase her dose of this medication due to the side effects and she gets no relief with just 1 capsule. Will route to provider to review.    Plan form 06/01/22:     Physical Therapy: ordered PHYSICAL THERAPY (Scheduled 06/22/22)     Medication Management:   1. I don't recommend opiate medication for this chronic pain  2. START gabapentin 300mg capsules. Take 300mg at bedtime for 7 days, then take 300mg twice daily for 7 days, then take 300mg three times daily for 7 days, then take 300mg in the morning and afternoon and 600mg (2 capsules) at bedtime. If side effects, reduce to last tolerable dosage.   --------------  LIAM Celeste, RN  Care Coordinator  [facility]

## 2022-06-08 NOTE — TELEPHONE ENCOUNTER
Please reach out to patient and see if she would be willing to reduce dosing to gabapentin 100mg dosing and see if we can titrate up from there. Often, people with side effects are able to tolerate this med and titrate up IF we start at a lower dosage.    Also, we had discussed lumbar facet joint steroid injections that I think would help her low back pain, I gave her handouts the day I saw her in clinic.     Deena NEAL RN CNP, FNP  [facility]  [facility] [place]

## 2022-06-09 NOTE — TELEPHONE ENCOUNTER
Spoke with Liane and she does not want to continue on this medication. She is going to discontinue it. She does not want any injections. She does not want to schedule a follow up. She wants pain meds and is going to find another doctor that will give her some. Patient ended the call by hanging up on me while trying to give her the scheduling number incase she changes her mind.     GRICEL CelesteN, RN  Care Coordinator  [facility] [place]

## 2022-06-14 NOTE — TELEPHONE ENCOUNTER
Information noted.     Deena NEAL, RN CNP, FNP  [facility]  [facility] [place]

## 2022-06-22 ENCOUNTER — THERAPY VISIT (OUTPATIENT)
Dept: PHYSICAL THERAPY | Facility: CLINIC | Age: 52
End: 2022-06-22
Attending: NURSE PRACTITIONER
Payer: COMMERCIAL

## 2022-06-22 DIAGNOSIS — M53.3 PAIN OF BOTH SACROILIAC JOINTS: ICD-10-CM

## 2022-06-22 DIAGNOSIS — M54.50 CHRONIC LEFT-SIDED LOW BACK PAIN WITHOUT SCIATICA: ICD-10-CM

## 2022-06-22 DIAGNOSIS — G89.29 CHRONIC LEFT-SIDED LOW BACK PAIN WITHOUT SCIATICA: ICD-10-CM

## 2022-06-22 DIAGNOSIS — M54.59 LUMBAR FACET JOINT PAIN: ICD-10-CM

## 2022-06-22 DIAGNOSIS — M53.3 SACROILIAC JOINT DYSFUNCTION OF BOTH SIDES: ICD-10-CM

## 2022-06-22 DIAGNOSIS — M54.50 ACUTE BILATERAL LOW BACK PAIN WITHOUT SCIATICA: ICD-10-CM

## 2022-06-22 PROCEDURE — 97161 PT EVAL LOW COMPLEX 20 MIN: CPT | Mod: GP | Performed by: PHYSICAL THERAPIST

## 2022-06-22 PROCEDURE — 97140 MANUAL THERAPY 1/> REGIONS: CPT | Mod: GP | Performed by: PHYSICAL THERAPIST

## 2022-06-22 PROCEDURE — 97110 THERAPEUTIC EXERCISES: CPT | Mod: GP | Performed by: PHYSICAL THERAPIST

## 2022-06-22 NOTE — PROGRESS NOTES
[facility] Physical Therapy Initial Evaluation  6/22/2022     Patient's Name: Liane Key  Referring Provider: Deena Ordaz  Visit Diagnosis: No diagnosis found.  Payor: MEDICA / Plan: MEDICA VANTAGEPLUS FULLY INSURED / Product Type: HMO /     Precautions/Restrictions/MD instructions: 6/1/22 evaluate and treat    Therapist ASSESSMENT  Liane Key is a 52 year old female patient presenting to Physical Therapy with mechanical low back pain since a lifting injury in April. Patient demonstrates decreased tolerance to lumbar flexion and extension, decreased lumbar segmental mobility and increased hypertonicity of lumbar paraspinals. Signs and symptoms are consistent with mechanical low back pain, subacute. These impairments limit their ability to work, take care of grandchildren and household. Skilled PT services are necessary in order to reduce impairments and improve independent function.      SUBJECTIVE   Liane Key is a 52 year old female who presents to outpatient physical therapy for evaluation. Her symptoms consist of:  1. Low back pain, left sided      Patient Comments (HPI): She reports that her symptoms began 4/17/22 after moving bedroom furniture. She suddenly had sharp back pain, that has slightly improved since then. Symptoms are located across low back at approx L3-5 level. She rates pain as 7/10 on average. Overall, she reports her status remains unchanged.    She denies red flags, including Numbness, Saddle anaesthesia, Sudden changes in balance and Sudden and/or progressive weakness.  She reports the following red flags: none.    Aggravating Factors: standing, walking, prolonged sitting  Relieving Factors: Heat and pain medication    Previous Treatments: Medication     General health as reported by patient: good.    PMH/Review of Systems: Graves disease, hypothyroidism. I briefly reviewed the review of systems as noted on the health history form.  I am only responding to those  symptoms which are directly relevant the specific indication for my consultation. I recommended the patient follow up with their primary care referring provider to pursue any other symptoms which may be of concern.     Surgical history/trauma: See EMR. She denies any significant current illness or recent hospital admissions. She denies any regional implanted devices.  Imaging:  Results for orders placed or performed in visit on 05/18/22   MR Lumbar Spine w/o Contrast    Narrative    MR LUMBAR SPINE WITHOUT CONTRAST  5/18/2022 12:19 PM    INDICATION: Low back pain, more than six weeks. Acute midline low back  pain without sciatica.    TECHNIQUE: MRI images of the lumbar spine without contrast.  CONTRAST:  None.    COMPARISON: Lumbar spine radiograph 4/21/2022.    FINDINGS: Nomenclature is based on 5 lumbar type vertebral bodies.  Normal vertebral body heights. Normal alignment.  No marrow edema. No  pars defect. The conus tip is identified at L2. Right renal cyst.    T12-L1: Mild loss of disc signal. Normal disc height. No herniation.  Unremarkable facets. No stenosis.        L1-L2: Mild loss of disc signal. Normal disc height. No herniation.  Unremarkable facets. No stenosis.    L2-L3: Slight loss of disc signal. Normal disc height. No herniation.  Unremarkable facets. No stenosis.    L3-L4: Slight loss of disc height and signal. Tiny foraminal  protrusions. Minor facet arthropathy. No stenosis.    L4-L5: Slight loss of disc signal. Normal disc height. No herniation.  Mild facet arthropathy. No stenosis.    L5-S1: Moderate loss of disc signal. Mild loss of disc height. Central  posterior high intensity zone and small protrusion. Mild facet  arthropathy. Slight lateral recess narrowing without traversing nerve  root impingement. Mild foraminal narrowing.      Impression    IMPRESSION:  1.  Generally mild degenerative changes in the lumbar spine, most  pronounced at L5-S1 where there is mild lateral recess and  foraminal  narrowing without nerve root impingement.  2.  Minor degenerative changes above this.    HSAWN BURROUGHS MD         SYSTEM ID:  [id number]     Medications: See EMR    PERTINENT MEDICAL / SURGICAL HISTORY:   Patient Active Problem List   Diagnosis     CARDIOVASCULAR SCREENING; LDL GOAL LESS THAN 160     Hypothyroidism following radioiodine therapy     Class 1 obesity due to excess calories without serious comorbidity with body mass index (BMI) of 31.0 to 31.9 in adult     Elevated blood pressure reading without diagnosis of hypertension     History of Graves' disease     Chronic insomnia     Past Surgical History:   Procedure Laterality Date     CHOLECYSTECTOMY  1998          HYSTERECTOMY, PAP NO LONGER INDICATED  07/13/2018     LAPAROSCOPIC ASSISTED HYSTERECTOMY VAGINAL  07/13/2018    adenomyosis, fibroids      OPEN REDUCTION INTERNAL FIXATION ANKLE Left 1990          TUBAL LIGATION  1990       Occupation: care advocate   Social history/Living Environment: 2 grandkids at home  Current exercise regimen/Recreational activities: none,   Possible barriers impacting outcomes: multiple providers/procedures and low baseline physical ability    Patient Self-identified Goal: Liane Key would like to decrease her pain. She does mention that using Tramadol was helpful but she is not currently on it.        OBJECTIVE  Observation: wearing lumbar corset  Gait: normal, no asymmetries or obvious deviations and Able to heel and toe walk without difficulty  Transfers: increased time but independent  Screening (regional interdependence): Hip screen negative (WFL for transfers today) - noncontributory  Range of Motion: affected side: bilateral  Lumbar AROM full EXCEPT: Limited in flexion, extension and right sidebend  Neuro Screen:    Myotomes: L3-S1 intact   Dermatomes: not assessed   Reflexes: not tested  Other: n/a  Strength: Intact along LE myotomes (L2-S1)  Able to heel walk and toe raise  hip abductors,  adductors, internal rotators, external rotators 5/5  Functional movement: Requires multiple cues to complete posterior pelvic tilt  Palpation: Tender to palpation at: lumbar paraspinals bilateral, glute max right, to central PA springing at entire lumbar spine and to unilateral PA springing at entire lumbar spine  Segmental Assessment: HYPOmobile to central posterior to anterior springing at entire lumbar spine - guarded  Special Tests:  Positive: none   Negative: none       ASSESSMENT/PLAN  Patient is a 52 year old female with lumbar complaints.    Patient has the following significant findings with corresponding treatment plan.                Diagnosis 1:  Mechanical low back pain    Pain -  hot/cold therapy, manual therapy, self management and education  Decreased ROM/flexibility - manual therapy, therapeutic exercise and home program  Decreased joint mobility - manual therapy, therapeutic exercise and home program  Decreased strength - therapeutic exercise, therapeutic activities and home program  Impaired gait - gait training and home program  Impaired muscle performance - neuro re-education and home program  Decreased function - therapeutic activities and home program    Therapy Evaluation Codes:   Cumulative Therapy Evaluation is: Low complexity.   Previous and current functional limitations:  (See Goal Flow Sheet for this information)    Short term and Long term goals: (See Goal Flow Sheet for this information)     Communication ability:  Patient appears to be able to clearly communicate and understand verbal and written communication and follow directions correctly.    Treatment Explanation - The following has been discussed with the patient:   RX ordered/plan of care  Anticipated outcomes  Possible risks and side effects    This patient would benefit from PT intervention to resume normal activities.   Rehab potential is good.    Frequency:  1 X week, once daily  Duration:  for 2 months  Discharge Plan:   Achieve all LTG.  Independent in home treatment program.  Reach maximal therapeutic benefit.    Please refer to the daily flowsheet for treatment today, total treatment time and time spent performing 1:1 timed codes.     *Text entry may be via Dragon Dictation software in real-time. Efforts are made to edit for clarity.     Renetta Hood, PT, DPT  formerly [facility]

## 2022-06-22 NOTE — TELEPHONE ENCOUNTER
Asked patient about her pain level- pain has worsening since visit and patient is not able to get into physical therapy until 8/1/19.  advised patient she should be seen due to worsening pain. Visit scheduled.   Mily Leon RN     Solaraze Counseling:  I discussed with the patient the risks of Solaraze including but not limited to erythema, scaling, itching, weeping, crusting, and pain.

## 2022-06-22 NOTE — PROGRESS NOTES
[facility]    OUTPATIENT Physical Therapy ORTHOPEDIC EVALUATION  PLAN OF TREATMENT FOR OUTPATIENT REHABILITATION  (COMPLETE FOR INITIAL CLAIMS ONLY)  Patient's Last Name, First Name, M.I.  YOB: 1970  Liane Key    Provider s Name:  [facility]   Medical Record No.  [id number]   Start of Care Date:  06/22/22   Onset Date:   04/17/22 (start of pain date)   Type:     _X__PT   ___OT Medical Diagnosis:    Encounter Diagnoses   Name Primary?    Chronic left-sided low back pain without sciatica     Lumbar facet joint pain     Pain of both sacroiliac joints     Sacroiliac joint dysfunction of both sides         Treatment Diagnosis:  mechanical low back pain        Goals:     06/22/22 0500   Body Part   Goals listed below are for lumbar   Goal #1   Goal #1 lifting/carrying   Previous Functional Level No restrictions   Current Functional Level Cannot lift   Performance level from floor to waist   STG Target Performance Lift an item off floor to seat height weighing   Performance level 5# x 5 reps with no more than 3/10 pain   Rationale for safe care of infant/toddler   Due date 07/13/22   LTG Target Performance Lift an item off floor to seat height weighing   Performance Level 20# x 5 reps with no more than 3/10 pain   Rationale for safe care of infant/toddler   Due date 08/17/22       Therapy Frequency:  1x/week  Predicted Duration of Therapy Intervention:  8 weeks    DEANNA GOMEZ, PT                 I CERTIFY THE NEED FOR THESE SERVICES FURNISHED UNDER        THIS PLAN OF TREATMENT AND WHILE UNDER MY CARE     (Physician attestation of this document indicates review and certification of the therapy plan).                     Certification Date From:  06/22/22   Certification Date To:  08/17/22    Referring Provider:  Deena Ordaz    Initial Assessment        See  Epic Evaluation SOC Date: 06/22/22

## 2022-07-21 ENCOUNTER — VIRTUAL VISIT (OUTPATIENT)
Dept: FAMILY MEDICINE | Facility: CLINIC | Age: 52
End: 2022-07-21
Payer: COMMERCIAL

## 2022-07-21 DIAGNOSIS — F17.200 NICOTINE DEPENDENCE, UNCOMPLICATED, UNSPECIFIED NICOTINE PRODUCT TYPE: Primary | ICD-10-CM

## 2022-07-21 PROCEDURE — 99213 OFFICE O/P EST LOW 20 MIN: CPT | Mod: GT | Performed by: PHYSICIAN ASSISTANT

## 2022-07-21 RX ORDER — VARENICLINE TARTRATE 1 MG/1
1 TABLET, FILM COATED ORAL 2 TIMES DAILY
Qty: 60 TABLET | Refills: 1 | Status: SHIPPED | OUTPATIENT
Start: 2022-08-19 | End: 2022-12-23

## 2022-07-21 RX ORDER — VARENICLINE TARTRATE 0.5 MG/1
TABLET, FILM COATED ORAL
Qty: 95 TABLET | Refills: 0 | Status: SHIPPED | OUTPATIENT
Start: 2022-07-21 | End: 2022-08-11

## 2022-07-21 NOTE — PROGRESS NOTES
"Liane is a 52 year old who is being evaluated via a billable video visit.      How would you like to obtain your AVS? MyChart  If the video visit is dropped, the invitation should be resent by: Text to cell phone: [phone]  Will anyone else be joining your video visit? No          Assessment & Plan     Nicotine dependence, uncomplicated, unspecified nicotine product type  Patient interested in trying chantix. She would like to see how this goes, instead of starting Wellbutrin again. No further questions. I discussed with the patient risks and benefits of the new medication prescribed including potential side effects.  The patient had opportunity to ask questions and is comfortable with and interested in medications as prescribed.   - varenicline (CHANTIX) 0.5 MG tablet; Take 0.5 mg (1 tablet) once a day for 3 days, THEN 0.5 mg (1 tablet) twice daily for 4 days, THEN 1.0 mg (2 tablets) twice daily  - varenicline (CHANTIX) 1 MG tablet; Take 1 tablet (1 mg) by mouth 2 times daily             BMI:   Estimated body mass index is 32.8 kg/m  as calculated from the following:    Height as of 3/14/22: 1.76 m (5' 9.29\").    Weight as of 4/21/22: 101.6 kg (224 lb).   Weight management plan: Discussed healthy diet and exercise guidelines        Return in about 3 months (around 10/21/2022) for Routine preventive.    Marcelina Owens PA-C  [facility] [place] is a 52 year old, presenting for the following health issues:  Smoking Cessation      HPI     Patient is wanting to quit smoking, would like information on this.    Previously stopped smoking in 2012 due to diagnosis of thyroid disease. She used wellbutrin for this.   Then started smoking again, switched to vaping. Feels she goes to bed while vaping and wakes up vaping. Using it all day long. Wants to quit.        Review of Systems   Constitutional, HEENT, cardiovascular, pulmonary, gi and gu systems are negative, except as otherwise " noted.      Objective           Vitals:  No vitals were obtained today due to virtual visit.    Physical Exam   GENERAL: Healthy, alert and no distress  EYES: Eyes grossly normal to inspection.  No discharge or erythema, or obvious scleral/conjunctival abnormalities.  RESP: No audible wheeze, cough, or visible cyanosis.  No visible retractions or increased work of breathing.    SKIN: Visible skin clear. No significant rash, abnormal pigmentation or lesions.  NEURO: Cranial nerves grossly intact.  Mentation and speech appropriate for age.  PSYCH: Mentation appears normal, affect normal/bright, judgement and insight intact, normal speech and appearance well-groomed.                Video-Visit Details    Video Start Time: 3:14 PM    Type of service:  Video Visit    Video End Time:3:26 PM    Originating Location (pt. Location): Home    Distant Location (provider location):  [facility]     Platform used for Video Visit: [employer]ware[employer]  ..

## 2022-08-11 ENCOUNTER — OFFICE VISIT (OUTPATIENT)
Dept: FAMILY MEDICINE | Facility: CLINIC | Age: 52
End: 2022-08-11
Payer: COMMERCIAL

## 2022-08-11 VITALS
DIASTOLIC BLOOD PRESSURE: 86 MMHG | TEMPERATURE: 97.7 F | WEIGHT: 220.5 LBS | HEART RATE: 89 BPM | SYSTOLIC BLOOD PRESSURE: 126 MMHG | OXYGEN SATURATION: 100 % | HEIGHT: 69 IN | BODY MASS INDEX: 32.66 KG/M2

## 2022-08-11 DIAGNOSIS — Z41.1 ENCOUNTER FOR COSMETIC PROCEDURE: ICD-10-CM

## 2022-08-11 DIAGNOSIS — Z01.818 PREOP GENERAL PHYSICAL EXAM: Primary | ICD-10-CM

## 2022-08-11 LAB
ALBUMIN SERPL-MCNC: 3.8 G/DL (ref 3.4–5)
ALBUMIN UR-MCNC: NEGATIVE MG/DL
ALP SERPL-CCNC: 51 U/L (ref 40–150)
ALT SERPL W P-5'-P-CCNC: 23 U/L (ref 0–50)
ANION GAP SERPL CALCULATED.3IONS-SCNC: 7 MMOL/L (ref 3–14)
APPEARANCE UR: ABNORMAL
APTT PPP: 30 SECONDS (ref 22–38)
AST SERPL W P-5'-P-CCNC: 13 U/L (ref 0–45)
B-HCG SERPL-ACNC: <1 IU/L (ref 0–5)
BACTERIA #/AREA URNS HPF: ABNORMAL /HPF
BASOPHILS # BLD AUTO: 0 10E3/UL (ref 0–0.2)
BASOPHILS NFR BLD AUTO: 1 %
BILIRUB SERPL-MCNC: 1 MG/DL (ref 0.2–1.3)
BILIRUB UR QL STRIP: NEGATIVE
BUN SERPL-MCNC: 9 MG/DL (ref 7–30)
CALCIUM SERPL-MCNC: 9.2 MG/DL (ref 8.5–10.1)
CHLORIDE BLD-SCNC: 103 MMOL/L (ref 94–109)
CO2 SERPL-SCNC: 26 MMOL/L (ref 20–32)
COLOR UR AUTO: YELLOW
CREAT SERPL-MCNC: 0.68 MG/DL (ref 0.52–1.04)
EOSINOPHIL # BLD AUTO: 0.1 10E3/UL (ref 0–0.7)
EOSINOPHIL NFR BLD AUTO: 2 %
ERYTHROCYTE [DISTWIDTH] IN BLOOD BY AUTOMATED COUNT: 12 % (ref 10–15)
GFR SERPL CREATININE-BSD FRML MDRD: >90 ML/MIN/1.73M2
GLUCOSE BLD-MCNC: 104 MG/DL (ref 70–99)
GLUCOSE UR STRIP-MCNC: NEGATIVE MG/DL
HCT VFR BLD AUTO: 38.4 % (ref 35–47)
HGB BLD-MCNC: 12.6 G/DL (ref 11.7–15.7)
HGB UR QL STRIP: NEGATIVE
HIV 1+2 AB+HIV1 P24 AG SERPL QL IA: NONREACTIVE
INR PPP: 1 (ref 0.85–1.15)
KETONES UR STRIP-MCNC: NEGATIVE MG/DL
LEUKOCYTE ESTERASE UR QL STRIP: NEGATIVE
LYMPHOCYTES # BLD AUTO: 1.6 10E3/UL (ref 0.8–5.3)
LYMPHOCYTES NFR BLD AUTO: 28 %
MCH RBC QN AUTO: 29.9 PG (ref 26.5–33)
MCHC RBC AUTO-ENTMCNC: 32.8 G/DL (ref 31.5–36.5)
MCV RBC AUTO: 91 FL (ref 78–100)
MONOCYTES # BLD AUTO: 0.6 10E3/UL (ref 0–1.3)
MONOCYTES NFR BLD AUTO: 10 %
NEUTROPHILS # BLD AUTO: 3.4 10E3/UL (ref 1.6–8.3)
NEUTROPHILS NFR BLD AUTO: 60 %
NITRATE UR QL: NEGATIVE
PH UR STRIP: 6 [PH] (ref 5–7)
PLATELET # BLD AUTO: 276 10E3/UL (ref 150–450)
POTASSIUM BLD-SCNC: 3.8 MMOL/L (ref 3.4–5.3)
PROT SERPL-MCNC: 7.9 G/DL (ref 6.8–8.8)
RBC # BLD AUTO: 4.22 10E6/UL (ref 3.8–5.2)
RBC #/AREA URNS AUTO: ABNORMAL /HPF
SODIUM SERPL-SCNC: 136 MMOL/L (ref 133–144)
SP GR UR STRIP: 1.02 (ref 1–1.03)
SQUAMOUS #/AREA URNS AUTO: ABNORMAL /LPF
T3FREE SERPL-MCNC: 2.1 PG/ML (ref 2–4.4)
T4 FREE SERPL-MCNC: 0.98 NG/DL (ref 0.76–1.46)
TSH SERPL DL<=0.005 MIU/L-ACNC: 3.36 MU/L (ref 0.4–4)
UROBILINOGEN UR STRIP-ACNC: 0.2 E.U./DL
WBC # BLD AUTO: 5.7 10E3/UL (ref 4–11)
WBC #/AREA URNS AUTO: ABNORMAL /HPF

## 2022-08-11 PROCEDURE — 36415 COLL VENOUS BLD VENIPUNCTURE: CPT | Performed by: FAMILY MEDICINE

## 2022-08-11 PROCEDURE — 84439 ASSAY OF FREE THYROXINE: CPT | Performed by: FAMILY MEDICINE

## 2022-08-11 PROCEDURE — 87086 URINE CULTURE/COLONY COUNT: CPT | Performed by: FAMILY MEDICINE

## 2022-08-11 PROCEDURE — 99214 OFFICE O/P EST MOD 30 MIN: CPT | Performed by: FAMILY MEDICINE

## 2022-08-11 PROCEDURE — 85610 PROTHROMBIN TIME: CPT | Performed by: FAMILY MEDICINE

## 2022-08-11 PROCEDURE — 84481 FREE ASSAY (FT-3): CPT | Performed by: FAMILY MEDICINE

## 2022-08-11 PROCEDURE — 93000 ELECTROCARDIOGRAM COMPLETE: CPT | Performed by: FAMILY MEDICINE

## 2022-08-11 PROCEDURE — 80050 GENERAL HEALTH PANEL: CPT | Performed by: FAMILY MEDICINE

## 2022-08-11 PROCEDURE — 85730 THROMBOPLASTIN TIME PARTIAL: CPT | Performed by: FAMILY MEDICINE

## 2022-08-11 PROCEDURE — 81001 URINALYSIS AUTO W/SCOPE: CPT | Performed by: FAMILY MEDICINE

## 2022-08-11 PROCEDURE — 87389 HIV-1 AG W/HIV-1&-2 AB AG IA: CPT | Performed by: FAMILY MEDICINE

## 2022-08-11 PROCEDURE — 84702 CHORIONIC GONADOTROPIN TEST: CPT | Performed by: FAMILY MEDICINE

## 2022-08-11 ASSESSMENT — PATIENT HEALTH QUESTIONNAIRE - PHQ9
SUM OF ALL RESPONSES TO PHQ QUESTIONS 1-9: 1
SUM OF ALL RESPONSES TO PHQ QUESTIONS 1-9: 1
10. IF YOU CHECKED OFF ANY PROBLEMS, HOW DIFFICULT HAVE THESE PROBLEMS MADE IT FOR YOU TO DO YOUR WORK, TAKE CARE OF THINGS AT HOME, OR GET ALONG WITH OTHER PEOPLE: NOT DIFFICULT AT ALL

## 2022-08-11 ASSESSMENT — PAIN SCALES - GENERAL: PAINLEVEL: NO PAIN (0)

## 2022-08-11 NOTE — PATIENT INSTRUCTIONS
No ibuprofen/ aspirin/ naproxen  type meds the week prior to surgery    Tylenol ( acetaminophen ) okay    We will send you lab results

## 2022-08-11 NOTE — PROGRESS NOTES
[facility]  [address] [place] [address]  [place] [address]  Phone: [phone]  Primary Provider: No Ref-Primary, Physician  Pre-op Performing Provider: CROW LEWIS       PREOPERATIVE EVALUATION:  Today's date: 8/11/2022    Liane Key is a 52 year old female who presents for a preoperative evaluation.    Surgical Information:  Surgery/Procedure: Tummy Tuck  Surgery Location: [place]  Surgeon: Dr. Wells  Surgery Date: 09/01/2022  Time of Surgery: TBD  Where patient plans to recover: Other: at hotel until coming back home  Fax number for surgical facility:     Type of Anesthesia Anticipated: General    [facility] in [place]  Procedure is at this clinic in [place]    Subjective     HPI related to upcoming procedure: 52 year old female to have cosmetic procedure abominoplasty ( tummy tuck ) Sept 1.    Preop Questions 8/11/2022   1. Have you ever had a heart attack or stroke? No   2. Have you ever had surgery on your heart or blood vessels, such as a stent placement, a coronary artery bypass, or surgery on an artery in your head, neck, heart, or legs? No   3. Do you have chest pain with activity? No   4. Do you have a history of  heart failure? No   5. Do you currently have a cold, bronchitis or symptoms of other infection? No   6. Do you have a cough, shortness of breath, or wheezing? No   7. Do you or anyone in your family have previous history of blood clots? No   8. Do you or does anyone in your family have a serious bleeding problem such as prolonged bleeding following surgeries or cuts? No   9. Have you ever had problems with anemia or been told to take iron pills? YES - taking one iron pill 3x daily   10. Have you had any abnormal blood loss such as black, tarry or bloody stools, or abnormal vaginal bleeding? No   11. Have you ever had a blood transfusion? No   12. Are you willing to have a blood transfusion if it is medically needed before, during, or after your surgery?  Yes   13. Have you or any of your relatives ever had problems with anesthesia? No   14. Do you have sleep apnea, excessive snoring or daytime drowsiness? No   15. Do you have any artifical heart valves or other implanted medical devices like a pacemaker, defibrillator, or continuous glucose monitor? No   16. Do you have artificial joints? No   17. Are you allergic to latex? No   18. Is there any chance that you may be pregnant? No       Health Care Directive:  Patient does not have a Health Care Directive or Living Will:     Preoperative Review of :  Not on any controlled substances  {Review MNPMP for all patients per [employer]CSI MNPMP Profile:73786       Review of Systems  Constitutional, neuro, ENT, endocrine, pulmonary, cardiac, gastrointestinal, genitourinary, musculoskeletal, integument and psychiatric systems are negative, except as otherwise noted.    No recent illnesses    On iron for long  Time once daily.  About 1 1/2 weeks ago started taking 3x daily in anticipation of procedure.    Off smoking since started chantix two weeks ago ( patient was vaping, not smoking )    No anesthesia problems     No bleeding or clotting disorders        Patient Active Problem List    Diagnosis Date Noted     Acute bilateral low back pain without sciatica 06/22/2022     Priority: Medium     History of Graves' disease 02/19/2019     Priority: Medium     Elevated blood pressure reading without diagnosis of hypertension 06/12/2018     Priority: Medium     Class 1 obesity due to excess calories without serious comorbidity with body mass index (BMI) of 31.0 to 31.9 in adult      Priority: Medium     Hypothyroidism following radioiodine therapy 09/20/2012     Priority: Medium     CARDIOVASCULAR SCREENING; LDL GOAL LESS THAN 160 03/01/2012     Priority: Medium     Chronic insomnia 03/10/2022     Priority: Low      Past Medical History:   Diagnosis Date     Graves disease      Hypothyroidism following radioiodine therapy      Obesity   "    Syphilis 5/8/2015     Past Surgical History:   Procedure Laterality Date     CHOLECYSTECTOMY  1998          HYSTERECTOMY, PAP NO LONGER INDICATED  07/13/2018     LAPAROSCOPIC ASSISTED HYSTERECTOMY VAGINAL  07/13/2018    adenomyosis, fibroids      OPEN REDUCTION INTERNAL FIXATION ANKLE Left 1990          TUBAL LIGATION  1990     Current Outpatient Medications   Medication Sig Dispense Refill     amitriptyline (ELAVIL) 50 MG tablet Take 1 tablet (50 mg) by mouth At Bedtime +++NEED ANNUAL EXAM+++ 90 tablet 0     levothyroxine (SYNTHROID/LEVOTHROID) 125 MCG tablet Take 1 tablet (125 mcg) by mouth daily 90 tablet 1     Prenatal MV-Min-Fe Fum-FA-DHA (PRENATAL 1 PO) Take 1 tablet by mouth daily       tiZANidine (ZANAFLEX) 4 MG tablet Take 1 tablet (4 mg) by mouth 3 times daily as needed for muscle spasms 30 tablet 0     varenicline (CHANTIX) 0.5 MG tablet Take 0.5 mg (1 tablet) once a day for 3 days, THEN 0.5 mg (1 tablet) twice daily for 4 days, THEN 1.0 mg (2 tablets) twice daily 95 tablet 0     [START ON 8/19/2022] varenicline (CHANTIX) 1 MG tablet Take 1 tablet (1 mg) by mouth 2 times daily 60 tablet 1   TO CLARIFY,  PATIENT NOT ON PRENATAL VITAMIN  TAKING IRON PILL NOW 3X DAILY  ONE VITAMIN C  PILL DAILY  ONE CALCIUM PILL DAILY   PATIENT NOT ON TIZANDINE CURRENTLY   NOT ON AMITRIPTYLINE EITHER    No Known Allergies     Social History     Tobacco Use     Smoking status: Former Smoker     Packs/day: 0.20     Years: 5.00     Pack years: 1.00     Types: Cigarettes     Quit date: 5/30/2012     Years since quitting: 10.2     Smokeless tobacco: Never Used   Substance Use Topics     Alcohol use: Yes     Comment: nightly one        History   Drug Use Unknown         Objective     /86 (BP Location: Left arm, Patient Position: Chair, Cuff Size: Adult Regular)   Pulse 89   Temp 97.7  F (36.5  C) (Temporal)   Ht 1.76 m (5' 9.29\")   Wt 100 kg (220 lb 8 oz)   LMP 06/07/2018   SpO2 100%   Breastfeeding No   BMI " 32.29 kg/m      Physical Exam    GENERAL APPEARANCE: healthy, alert and no distress     EYES: EOMI, PERRL     HENT: ear canals and TM's normal and nose and mouth without ulcers or lesions     NECK: no adenopathy, no asymmetry, masses, or scars and thyroid normal to palpation     RESP: lungs clear to auscultation - no rales, rhonchi or wheezes     CV: regular rates and rhythm, normal S1 S2, no S3 or S4 and no murmur, click or rub     ABDOMEN:  soft, nontender, no HSM or masses and bowel sounds normal     MS: extremities normal- no gross deformities noted, no evidence of inflammation in joints, FROM in all extremities.     SKIN: no suspicious lesions or rashes     NEURO: Normal strength and tone, sensory exam grossly normal, mentation intact and speech normal     PSYCH: mentation appears normal. and affect normal/bright     LYMPHATICS: No cervical adenopathy    Recent Labs   Lab Test 12/22/21  1041   A1C 5.0        Diagnostics:      Ekg, cxr, and multiple labs ordered.  Results pending.          ASSESSMENT / PLAN:  (Z01.818) Preop general physical exam  (primary encounter diagnosis)  Comment: patient in good general health.  Lab results  Pending.  No history of anesthesia problems.   Patient not on any blood thinners.  Plan: TSH, T4 free, CBC with Platelets &         Differential, Comprehensive metabolic panel,         HIV Antigen Antibody Combo, INR, UA with         Microscopic - lab collect, Urine Culture         Aerobic Bacterial - lab collect, Partial         thromboplastin time, HCG quantitative         pregnancy, T3, Free, EKG 12-lead complete         w/read - Clinics, XR Chest 2 Views        If labs all okay then okay for procedure.     (Z41.1) Encounter for cosmetic procedure  Comment: as above   Plan: as above       No cardiac history or symptoms.     Patient has been stable on this dose of thyroid for quite a while       Of note, we did attempt an ekg here. However the tracing quality is very poor. Perhaps  related to the metal patient has on her body and also lots of oil/ lotion on skin.  In any case, the surgeon's office is requiring patient to see a cardiologist anyway and that is scheduled for a couple weeks from now. Defer to cardiology for their opinion on ekg and cardiac clearance.      I reviewed the patient's medications, allergies, medical history, family history, and social history.    Kwesi Ram MD        Revised Cardiac Risk Index (RCRI):  The patient has the following serious cardiovascular risks for perioperative complications:   - No serious cardiac risks = 0 points     RCRI Interpretation: 0 points: Class I (very low risk - 0.4% complication rate)           Signed Electronically by: Kwesi Ram MD  Copy of this evaluation report is provided to requesting physician.       Answers for HPI/ROS submitted by the patient on 8/11/2022  If you checked off any problems, how difficult have these problems made it for you to do your work, take care of things at home, or get along with other people?: Not difficult at all  PHQ9 TOTAL SCORE: 1

## 2022-08-12 LAB — BACTERIA UR CULT: NORMAL

## 2022-08-19 ENCOUNTER — TELEPHONE (OUTPATIENT)
Dept: FAMILY MEDICINE | Facility: CLINIC | Age: 52
End: 2022-08-19

## 2022-08-19 NOTE — TELEPHONE ENCOUNTER
Pt calling to find out date she had a hysterectomy. Advised pt per history had a hysterectomy on 7/13/2018. Pt asked for spelling of Levothyroxine. Assisted with this. Pt had no further questions.    Isabelle Goins RN  [facility]

## 2022-08-22 ENCOUNTER — TELEPHONE (OUTPATIENT)
Dept: FAMILY MEDICINE | Facility: CLINIC | Age: 52
End: 2022-08-22

## 2022-08-22 NOTE — TELEPHONE ENCOUNTER
Routing to team blue    Please fax copy of chest x-ray and INR from 8-11 to      [facility]    Fax [phone]        RN received call from patient regarding above.  Per patient, they have not received a copy yet  Rigoberto Hoover, RN, BSN, PHN  [facility]

## 2022-08-25 ENCOUNTER — OFFICE VISIT (OUTPATIENT)
Dept: CARDIOLOGY | Facility: CLINIC | Age: 52
End: 2022-08-25
Payer: COMMERCIAL

## 2022-08-25 VITALS
HEART RATE: 89 BPM | OXYGEN SATURATION: 98 % | BODY MASS INDEX: 31.92 KG/M2 | DIASTOLIC BLOOD PRESSURE: 87 MMHG | SYSTOLIC BLOOD PRESSURE: 121 MMHG | WEIGHT: 218 LBS

## 2022-08-25 DIAGNOSIS — E89.0 HYPOTHYROIDISM FOLLOWING RADIOIODINE THERAPY: Primary | ICD-10-CM

## 2022-08-25 DIAGNOSIS — Z01.810 PRE-OPERATIVE CARDIOVASCULAR EXAMINATION: ICD-10-CM

## 2022-08-25 PROCEDURE — 99204 OFFICE O/P NEW MOD 45 MIN: CPT | Performed by: INTERNAL MEDICINE

## 2022-08-25 PROCEDURE — 93000 ELECTROCARDIOGRAM COMPLETE: CPT

## 2022-08-25 NOTE — LETTER
Date:August 26, 2022      Patient was self referred, no letter generated. Do not send.        [facility] Information

## 2022-08-25 NOTE — PATIENT INSTRUCTIONS
Thank you for coming to the [facility] at [facility]; please note the following instructions:    1. No prohibitive factors for surgery from a cardiac stand point.            If you have any questions regarding your visit, please contact your care team:     CARDIOLOGY  TELEPHONE NUMBER   Cherelle MARCIALJose, Registered Nurse  Yudy DOWLING, Registered Nurse  Gloria DANG, Registered Medical Assistant  Alla AMARO, Visit Facilitator [phone] (select option 1)    *After hours: [phone]   For Scheduling Appts:     [phone] (select option 1)    *After hours: [phone]   For the Device Clinic (Pacemakers and ICD's)  Nelly LLOYD, Registered Nurse   During business hours: [phone]    *After business hours:  [phone] (select option 4)      Normal test result notifications will be released via charming charlie or mailed within 7 business days.  All other test results, will be communicated via telephone once reviewed by your cardiologist.    If you need a medication refill, please contact your pharmacy.  Please allow 3 business days for your refill to be completed.    As always, thank you for trusting us with your health care needs!

## 2022-08-25 NOTE — PROGRESS NOTES
SUBJECTIVE:  Liane Key is a 52 year old female who presents for preop evaluation.  Scheduled to have abdominoplasty on 9/1/2022 in [place].  Patient is very active.  Denied chest pain shortness of breath palpitations or any other cardiac symptoms.  No prior cardiac history.  She has no significant CAD risk factors.  No history of hypertension or diabetes.  No premature coronary artery disease in family.  Lipids unknown.  Non-smoker but used to vape for the past 3 years.  Recently quit this also after taking Chantix.    Patient Active Problem List    Diagnosis Date Noted     Acute bilateral low back pain without sciatica 06/22/2022     Priority: Medium     History of Graves' disease 02/19/2019     Priority: Medium     Elevated blood pressure reading without diagnosis of hypertension 06/12/2018     Priority: Medium     Class 1 obesity due to excess calories without serious comorbidity with body mass index (BMI) of 31.0 to 31.9 in adult      Priority: Medium     Hypothyroidism following radioiodine therapy 09/20/2012     Priority: Medium     CARDIOVASCULAR SCREENING; LDL GOAL LESS THAN 160 03/01/2012     Priority: Medium     Chronic insomnia 03/10/2022     Priority: Low    .  Current Outpatient Medications   Medication Sig     levothyroxine (SYNTHROID/LEVOTHROID) 125 MCG tablet Take 1 tablet (125 mcg) by mouth daily     Multiple Vitamins-Iron (ONE-TABLET-DAILY/IRON PO)      varenicline (CHANTIX) 1 MG tablet Take 1 tablet (1 mg) by mouth 2 times daily     No current facility-administered medications for this visit.     Past Medical History:   Diagnosis Date     Graves disease      Hypothyroidism following radioiodine therapy      Obesity      Syphilis 5/8/2015     Past Surgical History:   Procedure Laterality Date     CHOLECYSTECTOMY  1998          HYSTERECTOMY, PAP NO LONGER INDICATED  07/13/2018     LAPAROSCOPIC ASSISTED HYSTERECTOMY VAGINAL  07/13/2018    adenomyosis, fibroids      OPEN REDUCTION INTERNAL  FIXATION ANKLE Left 1990          TUBAL LIGATION  1990     No Known Allergies  Social History     Socioeconomic History     Marital status:      Spouse name: Not on file     Number of children: 4     Years of education: Not on file     Highest education level: Not on file   Occupational History     Occupation: works with disabled      Employer: [employer]   Tobacco Use     Smoking status: Former Smoker     Packs/day: 0.20     Years: 5.00     Pack years: 1.00     Types: Cigarettes     Quit date: 5/30/2012     Years since quitting: 10.2     Smokeless tobacco: Never Used   Vaping Use     Vaping Use: Never used   Substance and Sexual Activity     Alcohol use: Yes     Comment: nightly one     Drug use: Not Currently     Types: Marijuana     Sexual activity: Yes     Partners: Male     Birth control/protection: Female Surgical, Pill   Other Topics Concern     Parent/sibling w/ CABG, MI or angioplasty before 65F 55M? No   Social History Narrative    Lives with her 2 grand kids at home.      Social Determinants of Health     Financial Resource Strain: Not on file   Food Insecurity: Not on file   Transportation Needs: Not on file   Physical Activity: Not on file   Stress: Not on file   Social Connections: Not on file   Intimate Partner Violence: Not on file   Housing Stability: Not on file     Family History   Problem Relation Age of Onset     Unknown/Adopted Maternal Grandmother      Unknown/Adopted Maternal Grandfather      Unknown/Adopted Paternal Grandmother      Unknown/Adopted Paternal Grandfather      Depression Sister      Thyroid Disease Maternal Aunt      Cancer No family hx of      Diabetes No family hx of      Hypertension No family hx of      Cerebrovascular Disease No family hx of      Glaucoma No family hx of      Macular Degeneration No family hx of           REVIEW OF SYSTEMS:  General: negative, fever, chills, night sweats  Skin: negative, acne, rash and scaling  Eyes: negative, double  vision, eye pain and photophobia  Ears/Nose/Throat: negative, nasal congestion and purulent rhinorrhea  Respiratory: No shortness of breath, dyspnea on exertion, cough, or hemoptysis and negative  Cardiovascular: negative       OBJECTIVE:  Blood pressure 121/87, pulse 89, weight 98.9 kg (218 lb), last menstrual period 06/07/2018, SpO2 98 %, not currently breastfeeding.  General Appearance: healthy, alert, active and no distress  Head: Normocephalic. No masses, lesions, tenderness or abnormalities  Eyes: conjuctiva clear, PERRL, EOM intact  Ears: External ears normal. Canals clear. TM's normal.  Nose: Nares normal  Mouth: normal  Neck: Supple, no cervical adenopathy, no thyromegaly  Lungs: clear to auscultation  Cardiac: regular rate and rhythm, normal S1 and S2, no murmur       ASSESSMENT/PLAN:  Patient here for preop evaluation.  Planning for abdominoplasty on 9/1/2022 in [place].  Patient with no prior cardiac history.  Currently fairly active with no cardiac symptoms.  She has no significant cardiac risk factors apart from vaping for 3 years which she quit recently.  No history of diabetes hypertension or premature coronary artery disease in family.  EKG done in clinic reviewed.  Normal sinus rhythm.  Normal EKG.  Patient had an exercise stress echocardiogram in 2015 which was completely normal.  Overall considering patient's age and lack of risk factors no additional cardiac evaluation is needed at this time prior to the surgery.  Patient may proceed with planned surgery.  No regular follow-up has been arranged.  As part of evaluation patient requested a PT as a requirement for preop evaluation.  Patient do have a normal PTT and INR and there is no need for PT as INR is normal.  Total visit duration 45 minutes.  This included face-to-face interview, physical exam, chart review, review of Care Everywhere, EKG, prior stress test and documentation.

## 2022-08-25 NOTE — NURSING NOTE
"Chief Complaint   Patient presents with     New Patient     Pt reports no heart symptoms, no cardiac issues, is scheduled for abdominoplasty 9/1- surgeon requesting cardiology clearance.        Initial /87 (BP Location: Right arm, Patient Position: Sitting, Cuff Size: Adult Large)   Pulse 89   Wt 98.9 kg (218 lb)   LMP 06/07/2018   SpO2 98%   BMI 31.92 kg/m   Estimated body mass index is 31.92 kg/m  as calculated from the following:    Height as of 8/11/22: 1.76 m (5' 9.29\").    Weight as of this encounter: 98.9 kg (218 lb)..  BP completed using cuff size: WAYNE Ho    "

## 2022-08-25 NOTE — LETTER
8/25/2022      RE: Liane Key  [address]  [place] [address]       Dear Colleague,    Thank you for the opportunity to participate in the care of your patient, iLane Key, at the [facility] [place] at [facility]. Please see a copy of my visit note below.       SUBJECTIVE:  Liane Key is a 52 year old female who presents for preop evaluation.  Scheduled to have abdominoplasty on 9/1/2022 in [place].  Patient is very active.  Denied chest pain shortness of breath palpitations or any other cardiac symptoms.  No prior cardiac history.  She has no significant CAD risk factors.  No history of hypertension or diabetes.  No premature coronary artery disease in family.  Lipids unknown.  Non-smoker but used to vape for the past 3 years.  Recently quit this also after taking Chantix.    Patient Active Problem List    Diagnosis Date Noted     Acute bilateral low back pain without sciatica 06/22/2022     Priority: Medium     History of Graves' disease 02/19/2019     Priority: Medium     Elevated blood pressure reading without diagnosis of hypertension 06/12/2018     Priority: Medium     Class 1 obesity due to excess calories without serious comorbidity with body mass index (BMI) of 31.0 to 31.9 in adult      Priority: Medium     Hypothyroidism following radioiodine therapy 09/20/2012     Priority: Medium     CARDIOVASCULAR SCREENING; LDL GOAL LESS THAN 160 03/01/2012     Priority: Medium     Chronic insomnia 03/10/2022     Priority: Low    .  Current Outpatient Medications   Medication Sig     levothyroxine (SYNTHROID/LEVOTHROID) 125 MCG tablet Take 1 tablet (125 mcg) by mouth daily     Multiple Vitamins-Iron (ONE-TABLET-DAILY/IRON PO)      varenicline (CHANTIX) 1 MG tablet Take 1 tablet (1 mg) by mouth 2 times daily     No current facility-administered medications for this visit.     Past Medical History:   Diagnosis Date     Graves disease       Hypothyroidism following radioiodine therapy      Obesity      Syphilis 5/8/2015     Past Surgical History:   Procedure Laterality Date     CHOLECYSTECTOMY  1998          HYSTERECTOMY, PAP NO LONGER INDICATED  07/13/2018     LAPAROSCOPIC ASSISTED HYSTERECTOMY VAGINAL  07/13/2018    adenomyosis, fibroids      OPEN REDUCTION INTERNAL FIXATION ANKLE Left 1990          TUBAL LIGATION  1990     No Known Allergies  Social History     Socioeconomic History     Marital status:      Spouse name: Not on file     Number of children: 4     Years of education: Not on file     Highest education level: Not on file   Occupational History     Occupation: works with disabled      Employer: [employer]   Tobacco Use     Smoking status: Former Smoker     Packs/day: 0.20     Years: 5.00     Pack years: 1.00     Types: Cigarettes     Quit date: 5/30/2012     Years since quitting: 10.2     Smokeless tobacco: Never Used   Vaping Use     Vaping Use: Never used   Substance and Sexual Activity     Alcohol use: Yes     Comment: nightly one     Drug use: Not Currently     Types: Marijuana     Sexual activity: Yes     Partners: Male     Birth control/protection: Female Surgical, Pill   Other Topics Concern     Parent/sibling w/ CABG, MI or angioplasty before 65F 55M? No   Social History Narrative    Lives with her 2 grand kids at home.      Social Determinants of Health     Financial Resource Strain: Not on file   Food Insecurity: Not on file   Transportation Needs: Not on file   Physical Activity: Not on file   Stress: Not on file   Social Connections: Not on file   Intimate Partner Violence: Not on file   Housing Stability: Not on file     Family History   Problem Relation Age of Onset     Unknown/Adopted Maternal Grandmother      Unknown/Adopted Maternal Grandfather      Unknown/Adopted Paternal Grandmother      Unknown/Adopted Paternal Grandfather      Depression Sister      Thyroid Disease Maternal Aunt      Cancer  No family hx of      Diabetes No family hx of      Hypertension No family hx of      Cerebrovascular Disease No family hx of      Glaucoma No family hx of      Macular Degeneration No family hx of           REVIEW OF SYSTEMS:  General: negative, fever, chills, night sweats  Skin: negative, acne, rash and scaling  Eyes: negative, double vision, eye pain and photophobia  Ears/Nose/Throat: negative, nasal congestion and purulent rhinorrhea  Respiratory: No shortness of breath, dyspnea on exertion, cough, or hemoptysis and negative  Cardiovascular: negative       OBJECTIVE:  Blood pressure 121/87, pulse 89, weight 98.9 kg (218 lb), last menstrual period 06/07/2018, SpO2 98 %, not currently breastfeeding.  General Appearance: healthy, alert, active and no distress  Head: Normocephalic. No masses, lesions, tenderness or abnormalities  Eyes: conjuctiva clear, PERRL, EOM intact  Ears: External ears normal. Canals clear. TM's normal.  Nose: Nares normal  Mouth: normal  Neck: Supple, no cervical adenopathy, no thyromegaly  Lungs: clear to auscultation  Cardiac: regular rate and rhythm, normal S1 and S2, no murmur       ASSESSMENT/PLAN:  Patient here for preop evaluation.  Planning for abdominoplasty on 9/1/2022 in [place].  Patient with no prior cardiac history.  Currently fairly active with no cardiac symptoms.  She has no significant cardiac risk factors apart from vaping for 3 years which she quit recently.  No history of diabetes hypertension or premature coronary artery disease in family.  EKG done in clinic reviewed.  Normal sinus rhythm.  Normal EKG.  Patient had an exercise stress echocardiogram in 2015 which was completely normal.  Overall considering patient's age and lack of risk factors no additional cardiac evaluation is needed at this time prior to the surgery.  Patient may proceed with planned surgery.  No regular follow-up has been arranged.  As part of evaluation patient requested a PT as a requirement for  preop evaluation.  Patient do have a normal PTT and INR and there is no need for PT as INR is normal.  Total visit duration 45 minutes.  This included face-to-face interview, physical exam, chart review, review of Care Everywhere, EKG, prior stress test and documentation.      Please do not hesitate to contact me if you have any questions/concerns.     Sincerely,     SHANTA Awan MD

## 2022-08-26 ENCOUNTER — TELEPHONE (OUTPATIENT)
Dept: CARDIOLOGY | Facility: CLINIC | Age: 52
End: 2022-08-26

## 2022-08-26 RX ORDER — LEVOTHYROXINE SODIUM 125 UG/1
125 TABLET ORAL DAILY
Qty: 90 TABLET | Refills: 1 | Status: SHIPPED | OUTPATIENT
Start: 2022-08-26 | End: 2023-03-28

## 2022-08-26 NOTE — TELEPHONE ENCOUNTER
Reason for Call:  Other call back    Detailed comments: Pt wants to know about her results. She saw it on Outbrain[employer] but did not understand what it was.     Phone Number Patient can be reached at: Home number on file [phone] (home)    Best Time: Anytime    Can we leave a detailed message on this number? YES    Call taken on 8/26/2022 at 5:14 PM by Cathy Shah

## 2022-08-27 DIAGNOSIS — F51.01 PRIMARY INSOMNIA: ICD-10-CM

## 2022-08-29 ENCOUNTER — TELEPHONE (OUTPATIENT)
Dept: FAMILY MEDICINE | Facility: CLINIC | Age: 52
End: 2022-08-29

## 2022-08-29 RX ORDER — AMITRIPTYLINE HYDROCHLORIDE 50 MG/1
50 TABLET ORAL AT BEDTIME
Qty: 90 TABLET | Refills: 0 | OUTPATIENT
Start: 2022-08-29

## 2022-08-29 NOTE — TELEPHONE ENCOUNTER
Writer spoke to patient.  refaxed Dr. pham's office notes, ekg and labs to preferred pharmacy.  Patient is happy and verbalized understanding.    Cherelle Ruiz RN  Cardiology Care Coordinator  [facility]  [phone] option 1       DC instructions

## 2022-08-29 NOTE — TELEPHONE ENCOUNTER
Patient calling in and said that the surgery center said she needs a PT (prothrombin time) specifically and not and INR or aptt.     Our system only recognizes INR when attempting to cue up PT.     Patient wants to verify with provider there is not another blood test she needs to do? If no she requests that this be relayed to the surgery center by calling [phone].    Thank you,  Myra Rees RN

## 2022-08-29 NOTE — TELEPHONE ENCOUNTER
Received call from patient stating that she would like a call back ASAP to discuss lab/test results from cardiology appt 08/25 with Dr. Awan. States that she will need the results of her tests faxed as soon as possible to the lab in [place]. Their fax number is [phone].    Call back number for patient is [phone].    Sylvia Stone RN   [facility]

## 2022-08-30 NOTE — TELEPHONE ENCOUNTER
I went through this a few months ago with another plastic surgery clinic in [place].  Please inform patient that we are not actually able to even order a P.T. test.  It is  Completely outdated and not available here.  It has been replaced by the INR.    Kwesi Ram MD

## 2022-08-30 NOTE — TELEPHONE ENCOUNTER
Notified patient of the message below. She verbalized understanding, however states that she would like writer to relay this to [facility] staff as well. Called the surgery center to relay information below ([phone]). The staff member verbalized understanding and states she will contact patient and arrange to have the PT drawn in [place]. Writer also notified patient of this.    LIAM Maldonado RN  [facility]

## 2022-09-08 ENCOUNTER — VIRTUAL VISIT (OUTPATIENT)
Dept: FAMILY MEDICINE | Facility: CLINIC | Age: 52
End: 2022-09-08
Payer: COMMERCIAL

## 2022-09-08 DIAGNOSIS — R10.84 ABDOMINAL PAIN, GENERALIZED: Primary | ICD-10-CM

## 2022-09-08 PROCEDURE — 99207 PR NO CHARGE LOS: CPT | Performed by: INTERNAL MEDICINE

## 2022-09-08 NOTE — PROGRESS NOTES
Liane is a 52 year old who is being evaluated via a billable video visit.      How would you like to obtain your AVS? MyChart  If the video visit is dropped, the invitation should be resent by: Text to cell phone: [phone]  Will anyone else be joining your video visit? No        Assessment & Plan     Abdominal pain, generalized  I told her that I cannot refill those medication by telephone and the I am genuinely concerned that she may have a complication from surgery if she is requiring more pain medication then her surgeon anticipated more than one week out from surgery.  Also the subjective fevers and nausea would not be expected at this point in her surgical recovery.  I recommended face to face medical attention today at urgent care or ER for hands on abdominal exam and possible labs or imaging to determine why she is needed strong pain medications this long after surgery.  After discussion, she understood and plans to go to ER.        21 minutes spent on the date of the encounter doing chart review, history and exam, documentation and further activities per the note           No follow-ups on file.    Vamsi Wolf MD  [facility]    John Rob is a 52 year old, presenting for the following health issues:  No chief complaint on file.      HPI       She had elective abdominoplasty surgery in [place] on 9/1  She ran out of pain medications and asked me for a refill  She describes moderate to severe abdominal pain and subjective fevers with mild nausea  She is having bowel movement       Review of Systems         Objective           Vitals:  No vitals were obtained today due to virtual visit.    Physical Exam   GENERAL: The video connection did not work since she was the passenger in a car at the time of the visit               Video-Visit Details    Video Start Time: 3:31 PM    Type of service:  Video Visit    Video End Time:3:39 PM    Originating Location (pt. Location):  Home    Distant Location (provider location):  [facility]     Platform used for Video Visit: Suleiman

## 2022-09-19 ENCOUNTER — OFFICE VISIT (OUTPATIENT)
Dept: FAMILY MEDICINE | Facility: CLINIC | Age: 52
End: 2022-09-19

## 2022-09-19 ENCOUNTER — ANCILLARY PROCEDURE (OUTPATIENT)
Dept: CT IMAGING | Facility: CLINIC | Age: 52
End: 2022-09-19
Attending: PHYSICIAN ASSISTANT
Payer: COMMERCIAL

## 2022-09-19 VITALS
HEART RATE: 107 BPM | TEMPERATURE: 98.5 F | OXYGEN SATURATION: 100 % | SYSTOLIC BLOOD PRESSURE: 115 MMHG | BODY MASS INDEX: 31.81 KG/M2 | DIASTOLIC BLOOD PRESSURE: 78 MMHG | RESPIRATION RATE: 16 BRPM | WEIGHT: 217.2 LBS

## 2022-09-19 DIAGNOSIS — L03.311 ABDOMINAL WALL CELLULITIS: ICD-10-CM

## 2022-09-19 DIAGNOSIS — S39.012A STRAIN OF LUMBAR REGION, INITIAL ENCOUNTER: ICD-10-CM

## 2022-09-19 DIAGNOSIS — R10.9 FLANK PAIN: ICD-10-CM

## 2022-09-19 DIAGNOSIS — L03.311 ABDOMINAL WALL CELLULITIS: Primary | ICD-10-CM

## 2022-09-19 LAB
ALBUMIN UR-MCNC: NEGATIVE MG/DL
APPEARANCE UR: CLEAR
BILIRUB UR QL STRIP: NEGATIVE
COLOR UR AUTO: YELLOW
ERYTHROCYTE [DISTWIDTH] IN BLOOD BY AUTOMATED COUNT: 13.2 % (ref 10–15)
GLUCOSE UR STRIP-MCNC: NEGATIVE MG/DL
HCT VFR BLD AUTO: 30 % (ref 35–47)
HGB BLD-MCNC: 9.5 G/DL (ref 11.7–15.7)
HGB UR QL STRIP: NEGATIVE
KETONES UR STRIP-MCNC: NEGATIVE MG/DL
LEUKOCYTE ESTERASE UR QL STRIP: NEGATIVE
MCH RBC QN AUTO: 30 PG (ref 26.5–33)
MCHC RBC AUTO-ENTMCNC: 31.7 G/DL (ref 31.5–36.5)
MCV RBC AUTO: 95 FL (ref 78–100)
NITRATE UR QL: NEGATIVE
PH UR STRIP: 6.5 [PH] (ref 5–7)
PLATELET # BLD AUTO: 516 10E3/UL (ref 150–450)
RBC # BLD AUTO: 3.17 10E6/UL (ref 3.8–5.2)
SP GR UR STRIP: 1.01 (ref 1–1.03)
UROBILINOGEN UR STRIP-ACNC: 0.2 E.U./DL
WBC # BLD AUTO: 7 10E3/UL (ref 4–11)

## 2022-09-19 PROCEDURE — 99215 OFFICE O/P EST HI 40 MIN: CPT | Performed by: PHYSICIAN ASSISTANT

## 2022-09-19 PROCEDURE — 81003 URINALYSIS AUTO W/O SCOPE: CPT | Performed by: PHYSICIAN ASSISTANT

## 2022-09-19 PROCEDURE — 36415 COLL VENOUS BLD VENIPUNCTURE: CPT | Performed by: PHYSICIAN ASSISTANT

## 2022-09-19 PROCEDURE — 80048 BASIC METABOLIC PNL TOTAL CA: CPT | Performed by: PHYSICIAN ASSISTANT

## 2022-09-19 PROCEDURE — 74177 CT ABD & PELVIS W/CONTRAST: CPT | Mod: TC | Performed by: RADIOLOGY

## 2022-09-19 PROCEDURE — 85027 COMPLETE CBC AUTOMATED: CPT | Performed by: PHYSICIAN ASSISTANT

## 2022-09-19 RX ORDER — CEPHALEXIN 500 MG/1
500 CAPSULE ORAL 4 TIMES DAILY
Qty: 40 CAPSULE | Refills: 0 | Status: SHIPPED | OUTPATIENT
Start: 2022-09-19 | End: 2022-09-29

## 2022-09-19 RX ORDER — IOPAMIDOL 755 MG/ML
200 INJECTION, SOLUTION INTRAVASCULAR ONCE
Status: COMPLETED | OUTPATIENT
Start: 2022-09-19 | End: 2022-09-19

## 2022-09-19 RX ORDER — OXYCODONE HYDROCHLORIDE 5 MG/1
2.5-5 TABLET ORAL EVERY 6 HOURS PRN
Qty: 8 TABLET | Refills: 0 | Status: SHIPPED | OUTPATIENT
Start: 2022-09-19 | End: 2022-12-28

## 2022-09-19 RX ADMIN — IOPAMIDOL 99 ML: 755 INJECTION, SOLUTION INTRAVASCULAR at 16:37

## 2022-09-19 RX ADMIN — Medication 78 ML: at 16:37

## 2022-09-19 ASSESSMENT — PAIN SCALES - GENERAL: PAINLEVEL: SEVERE PAIN (7)

## 2022-09-19 NOTE — TELEPHONE ENCOUNTER
The patient is requesting a refill on her Tizanidine 4 mg tablets.  Thank You,  Jaye De La Vega [facility] [place]

## 2022-09-19 NOTE — PATIENT INSTRUCTIONS
Robson Rob,    Thank you for allowing [facility] to manage your care.    I am unsure of the cause of your symptoms, but we will see what our workup shows.     If you develop worsening/changing symptoms at any time, please call 911 or go to the emergency department for evaluation.    I ordered some lab work, please go to the laboratory to get your studies.    I ordered a CT, please go to the  to check in for the study. You may leave after the CT is done and I will call you with results.    I sent your prescriptions to your pharmacy.    For your pain, please use Tylenol 650mg every 6 hours. Max acetaminophen (Tylenol) 4,000mg/24 hours    For severe pain not controlled by over the counter medications, please use oxycodone as prescribed. Do not use this medication while driving, operating machinery, with other sedating medications, or while drinking alcohol as it will make you drowsy. Continue to use the laxatives and stool softeners to prevent constipation while on this medicine.    I made a referral to plastic surgery. Please call the specialty number on your after visit summary.     If you have any questions or concerns, please feel free to call us at [phone]    Sincerely,    Osmel Metzger PA-C    Did you know?      You can schedule a video visit for follow-up appointments as well as future appointments for certain conditions.  Please see the below link.     [email or web address]    If you have not already donfe so,  I encourage you to sign up for [facility] ([email or web address]).  This will allow you to review your results, securely communicate with a provider, and schedule virtual visits as well.

## 2022-09-19 NOTE — PROGRESS NOTES
Assessment & Plan   Problem List Items Addressed This Visit    None     Visit Diagnoses     Abdominal wall cellulitis    -  Primary    Relevant Medications    cephALEXin (KEFLEX) 500 MG capsule    oxyCODONE (ROXICODONE) 5 MG tablet    Other Relevant Orders    Plastic Surgery Referral    CT Abdomen Pelvis w Contrast (Completed)    CBC with platelets (Completed)    Basic metabolic panel  (Ca, Cl, CO2, Creat, Gluc, K, Na, BUN)    Flank pain        Relevant Medications    oxyCODONE (ROXICODONE) 5 MG tablet    Other Relevant Orders    UA Macro with Reflex to Micro and Culture - lab collect (Completed)         Likely cellulitis of her abdominal wound post abdominoplasty 18 days ago in [place]. CT shows no definite abscess or other worrisome process. Constipation is much improved from her ER visit 4 days ago. She states that her abdomen and wounds were not examined during that visit. Will treat with course of cephalexin, continued bowel regimen, a limited supply of oxycodone for breakthrough pain and follow up with plastic surgery locally in the next 2-3 days for a recheck of her symptoms and to establish local care.       Complete history and physical exam as below. AF with normal VS except for mild tachycardia.    DDx and Dx discussed with and explained to the pt to their satisfaction.  All questions were answered at this time. Pt expressed understanding of and agreement with this dx, tx, and plan. No further workup warranted and standard medication warnings given. I have given the patient a list of pertinent indications for re-evaluation. Will go to the Emergency Department if symptoms worsen or new concerning symptoms arise. Patient left in no apparent distress.     Ordering of each unique test  Prescription drug management  48 minutes spent on the date of the encounter doing chart review, history and exam, documentation and further activities per the note     See Patient Instructions    Return in about 3 days (around  "9/22/2022) for a recheck of your symptoms if not improving, or call 911/go to an ER anytime if worsening.    MARIO Ann  [facility] NITESH Rob is a 52 year old presenting for the following health issues:  Hospital F/U      HPI       Hospital Follow-up Visit:    Hospital/Nursing Home/IP Rehab Facility: [facility]  Date of Admission: 9/13/22  Date of Discharge: 9/13/22  Reason(s) for Admission: Constipation    Was your hospitalization related to COVID-19? No   Problems taking medications regularly:  None  Medication changes since discharge: stool softners  Problems adhering to non-medication therapy:  None    Summary of hospitalization:      Had a \"tubeless\" tummy tuck at [facility] in [place], [place]. Continues to have back pain post op. Feels that she continues to have fluid accumulation in her abdomen and back. No significant abd pain. Has had subjective fevers and chills. Constipation that she had been seen in the ER for 6 days ago.    Diagnostic Tests/Treatments reviewed.  Follow up needed: emergent vs urgent follow up with plastic surgery to establish local post op care.  Other Healthcare Providers Involved in Patient s Care:         Specialist appointment - plastics referral placed.  Update since discharge: stable.   Post Medication Reconciliation Status:        Plan of care communicated with patient     Review of Systems   Constitutional, HEENT, cardiovascular, pulmonary, gi and gu systems are negative, except as otherwise noted.      Objective    /78   Pulse 107   Temp 98.5  F (36.9  C) (Tympanic)   Resp 16   Wt 98.5 kg (217 lb 3.2 oz)   LMP 06/07/2018   SpO2 100%   BMI 31.81 kg/m    Body mass index is 31.81 kg/m .  Physical Exam  Vitals and nursing note reviewed.   Constitutional:       General: She is not in acute distress.     Appearance: She is not ill-appearing or diaphoretic.   HENT:      Head: Normocephalic and atraumatic.      " Mouth/Throat:      Mouth: Mucous membranes are moist.   Eyes:      Conjunctiva/sclera: Conjunctivae normal.   Cardiovascular:      Rate and Rhythm: Normal rate and regular rhythm.      Heart sounds: Normal heart sounds. No murmur heard.    No friction rub. No gallop.   Pulmonary:      Effort: Pulmonary effort is normal. No respiratory distress.      Breath sounds: Normal breath sounds. No stridor. No wheezing, rhonchi or rales.   Abdominal:      General: Bowel sounds are normal. There is no distension.      Palpations: Abdomen is soft. There is no mass.      Tenderness: There is no abdominal tenderness. There is no guarding or rebound.      Hernia: No hernia is present.      Comments: Indurated, erythematous and mildly tender lower abdomen. Dressings clean dry and intact. Umbilical wound has scant serosanguinous drainage noted. Fluctuance noted throughout abdomen and flank.    Skin:     General: Skin is warm and dry.   Neurological:      General: No focal deficit present.      Mental Status: She is alert. Mental status is at baseline.   Psychiatric:         Mood and Affect: Mood normal.         Behavior: Behavior normal.          Results for orders placed or performed in visit on 09/19/22   CT Abdomen Pelvis w Contrast     Status: None    Narrative    EXAM: CT ABDOMEN PELVIS W CONTRAST  LOCATION: [facility]  DATE/TIME: 9/19/2022 4:42 PM    INDICATION: 18 days post tummy tuck with flank pain, likely abdominal cellulitis, r o abscess  COMPARISON: None.  TECHNIQUE: CT scan of the abdomen and pelvis was performed following injection of IV contrast. Multiplanar reformats were obtained. Dose reduction techniques were used.  CONTRAST: 99mL Isovue 370    FINDINGS:   LOWER CHEST: Normal.    HEPATOBILIARY: Prior cholecystectomy.    PANCREAS: Normal.    SPLEEN: Normal.    ADRENAL GLANDS: Normal.    KIDNEYS/BLADDER: Small right renal cyst needs no further workup, kidneys otherwise negative.    BOWEL: No  obstruction or inflammatory change. Appendix normal.    LYMPH NODES: Normal.    VASCULATURE: Unremarkable.    PELVIC ORGANS: Hysterectomy. 2.7 cm dominant follicle within normal-sized left ovary. No suspicious lesion. No free fluid.    MUSCULOSKELETAL: Diffuse subcutaneous soft tissue stranding throughout abdomen and flanks related to recent procedure. There are multiple subcutaneous fluid collections but the largest by far is positioned low and transversely between the umbilicus and   pubic symphysis with maximal dimensions of at least 27 x 9 x 4 cm. There are small symmetric collections also on both sides of the umbilicus measuring approximately 6.5 x 3.5 x 1.5 cm and the midline subxiphoid collection that measures 4 x 4 x 1 cm. None   of these collections demonstrate significant inflammatory response, however, superimposed infection can't be excluded.      Impression    IMPRESSION:   1.  Multiple subcutaneous fluid collections as described with the largest being low transverse and midline. No significant focal inflammatory response and likely these are all postoperative seromas, though superimposed infection can't be excluded   Results for orders placed or performed in visit on 09/19/22   UA Macro with Reflex to Micro and Culture - lab collect     Status: Normal    Specimen: Urine, Clean Catch   Result Value Ref Range    Color Urine Yellow Colorless, Straw, Light Yellow, Yellow    Appearance Urine Clear Clear    Glucose Urine Negative Negative mg/dL    Bilirubin Urine Negative Negative    Ketones Urine Negative Negative mg/dL    Specific Gravity Urine 1.015 1.003 - 1.035    Blood Urine Negative Negative    pH Urine 6.5 5.0 - 7.0    Protein Albumin Urine Negative Negative mg/dL    Urobilinogen Urine 0.2 0.2, 1.0 E.U./dL    Nitrite Urine Negative Negative    Leukocyte Esterase Urine Negative Negative    Narrative    Microscopic not indicated   CBC with platelets     Status: Abnormal   Result Value Ref Range    WBC  Count 7.0 4.0 - 11.0 10e3/uL    RBC Count 3.17 (L) 3.80 - 5.20 10e6/uL    Hemoglobin 9.5 (L) 11.7 - 15.7 g/dL    Hematocrit 30.0 (L) 35.0 - 47.0 %    MCV 95 78 - 100 fL    MCH 30.0 26.5 - 33.0 pg    MCHC 31.7 31.5 - 36.5 g/dL    RDW 13.2 10.0 - 15.0 %    Platelet Count 516 (H) 150 - 450 10e3/uL

## 2022-09-20 ENCOUNTER — TELEPHONE (OUTPATIENT)
Dept: FAMILY MEDICINE | Facility: CLINIC | Age: 52
End: 2022-09-20

## 2022-09-20 ENCOUNTER — PATIENT OUTREACH (OUTPATIENT)
Dept: PLASTIC SURGERY | Facility: CLINIC | Age: 52
End: 2022-09-20

## 2022-09-20 LAB
ANION GAP SERPL CALCULATED.3IONS-SCNC: 6 MMOL/L (ref 3–14)
BUN SERPL-MCNC: 10 MG/DL (ref 7–30)
CALCIUM SERPL-MCNC: 9.3 MG/DL (ref 8.5–10.1)
CHLORIDE BLD-SCNC: 105 MMOL/L (ref 94–109)
CO2 SERPL-SCNC: 25 MMOL/L (ref 20–32)
CREAT SERPL-MCNC: 0.76 MG/DL (ref 0.52–1.04)
GFR SERPL CREATININE-BSD FRML MDRD: >90 ML/MIN/1.73M2
GLUCOSE BLD-MCNC: 97 MG/DL (ref 70–99)
POTASSIUM BLD-SCNC: 3.9 MMOL/L (ref 3.4–5.3)
SODIUM SERPL-SCNC: 136 MMOL/L (ref 133–144)

## 2022-09-20 NOTE — TELEPHONE ENCOUNTER
Routing refill request to provider for review/approval because:  Drug not on the [facility] refill protocol   Drug not active on patient's medication list    Isabelle Goins RN  [facility]

## 2022-09-20 NOTE — TELEPHONE ENCOUNTER
FUTURE VISIT INFORMATION      FUTURE VISIT INFORMATION:    Date: 9/21/22    Time: 2:30am    Location: [facility]  REFERRAL INFORMATION:    Referring provider:  Demetrio Metzger PA    Referring providers clinic:  [facility]    Reason for visit/diagnosis  Procedure in [place] on 9/1-Abdominal wall cellulitis     RECORDS REQUESTED FROM:       Clinic name Comments Records Status Imaging Status   [facility] Ov/referral 9/19/22 Epic    [facility] ED ED visit 9/13/22 Epic    Imaging CT Abd 9/19/22 [facility]   [facility] in [place], [place] Requested surgical note 9/20                      [Two or more falls in past year] : Patient reported two or more falls in the past year [0] : 1) Little interest or pleasure doing things: Not at all (0) [] : No

## 2022-09-20 NOTE — TELEPHONE ENCOUNTER
I would like patient to follow up with me or a primary provider in one week to recheck her surgical wounds. Please call and offer her an appointment. F2F preferred. She is to go to the ER/UC if worsening. Thanks.

## 2022-09-21 ENCOUNTER — TELEPHONE (OUTPATIENT)
Dept: FAMILY MEDICINE | Facility: CLINIC | Age: 52
End: 2022-09-21

## 2022-09-21 ENCOUNTER — OFFICE VISIT (OUTPATIENT)
Dept: PLASTIC SURGERY | Facility: CLINIC | Age: 52
End: 2022-09-21
Payer: COMMERCIAL

## 2022-09-21 ENCOUNTER — PRE VISIT (OUTPATIENT)
Dept: PLASTIC SURGERY | Facility: CLINIC | Age: 52
End: 2022-09-21

## 2022-09-21 VITALS
HEART RATE: 101 BPM | SYSTOLIC BLOOD PRESSURE: 126 MMHG | OXYGEN SATURATION: 100 % | TEMPERATURE: 98.1 F | WEIGHT: 216.1 LBS | DIASTOLIC BLOOD PRESSURE: 80 MMHG | BODY MASS INDEX: 31.65 KG/M2

## 2022-09-21 DIAGNOSIS — L03.311 ABDOMINAL WALL CELLULITIS: Primary | ICD-10-CM

## 2022-09-21 PROCEDURE — 99204 OFFICE O/P NEW MOD 45 MIN: CPT | Performed by: STUDENT IN AN ORGANIZED HEALTH CARE EDUCATION/TRAINING PROGRAM

## 2022-09-21 PROCEDURE — 87070 CULTURE OTHR SPECIMN AEROBIC: CPT | Mod: 90 | Performed by: PATHOLOGY

## 2022-09-21 PROCEDURE — 99000 SPECIMEN HANDLING OFFICE-LAB: CPT | Performed by: PATHOLOGY

## 2022-09-21 PROCEDURE — 87075 CULTR BACTERIA EXCEPT BLOOD: CPT | Mod: 90 | Performed by: PATHOLOGY

## 2022-09-21 ASSESSMENT — PAIN SCALES - GENERAL: PAINLEVEL: MODERATE PAIN (5)

## 2022-09-21 NOTE — PROGRESS NOTES
"Plastic and Reconstructive Surgery   9/21/2022    HPI:   Liane is a pleasant 52 year old female with PMHx Graves disease on thyroid replacement who presents with abdominal pain, flank pain and swelling following abdominoplasty, BBL and liposuction in [place] on September 1, 2022. She has not had fevers, chills, malaise, or nausea. She states her abdominoplasty was a \"tubeless\" abdominoplasty. However, nearly immediately following surgery she felt as though she had fluid and swelling to her abdomen. She has been following outpatient with massage therapy per her surgeon's suggestions. She presented to her PCP due to concern of fluid collection/seroma after an internet search. She had a CT scan performed which showed large fluid collection and was referred to [facility] following her imaging.     Medications:   Current Outpatient Medications   Medication     cephALEXin (KEFLEX) 500 MG capsule     levothyroxine (SYNTHROID/LEVOTHROID) 125 MCG tablet     Multiple Vitamins-Iron (ONE-TABLET-DAILY/IRON PO)     oxyCODONE (ROXICODONE) 5 MG tablet     UNABLE TO FIND     varenicline (CHANTIX) 1 MG tablet     No current facility-administered medications for this visit.     Allergies:   No Known Allergies    Medical History:  Past Medical History:   Diagnosis Date     Graves disease      Hypothyroidism following radioiodine therapy      Obesity      Syphilis 5/8/2015     Surgical History:  Past Surgical History:   Procedure Laterality Date     CHOLECYSTECTOMY  1998          HYSTERECTOMY, PAP NO LONGER INDICATED  07/13/2018     LAPAROSCOPIC ASSISTED HYSTERECTOMY VAGINAL  07/13/2018    adenomyosis, fibroids      OPEN REDUCTION INTERNAL FIXATION ANKLE Left 1990          TUBAL LIGATION  1990     Family History:  No family history of bleeding or clotting problems or problems with anesthesia     ROS:   Review of systems negative except as noted above    Physical Exam:  /80   Pulse 101   Temp 98.1  F (36.7  C) (Oral)   Wt 98 " kg (216 lb 1.6 oz)   LMP 06/07/2018   SpO2 100%   BMI 31.65 kg/m    Gen: well appearing. Afebrile. Breathing non-labored.   Abdomen: umbilical incision with small area of breakdown. Small area of skin breakdown superior to mons without tunneling, drainage. Mild tenderness with palpation of abdomen that radiates to bilateral flanks, left worse than right.   Back: bilateral stab incisions healing well, no dehiscence or breakdown. No redness or warmth     CT Abdomen/Pelvis 9/19/2022  IMPRESSION:   1.  Multiple subcutaneous fluid collections as described with the largest being low transverse and midline. No significant focal inflammatory response and likely these are all postoperative seromas, though superimposed infection can't be excluded    Assessment:   52 year old female s/p abdominoplasty at [facility] now with multiple subcutaneous fluid collections as demonstrated on CT abdomen/pelvis from 9/19.     Plan:   Seroma aspiration in clinic  Fluid sent for culture  Plan to continue antibiotics per PCP  Await fluid culture results         Attending Attestation:  Agree with above. In brief, 53F ~3 weeks s/p drainless abdominoplasty p/w large suprapubic seroma with no signs of infection. Offered sterile in-clinic aspiration. Discussed risks of procedure with patient, including but not limited to: infection, bleeding, pain, injury to deeper structures including bowel, recurrent seroma. Despite these risks, patient consents to lower abdominal aspiration. After cleansing the skin over the seroma with chlorhexidine and using sterile gloves and a 21-gauge needle, 160 mL of brownish translucent fluid was aspirated. This fluid was sent for culture. Agree with continuing current course of antibiotics. Provided ER precautions re: signs for infection. Strict compression with abdominal binder and ABD pads over the suprapubic area. Return to clinic in 1 week for re-evaluation.     Tonny Wilhelm MD, PhD  Staff Plastic Surgeon

## 2022-09-21 NOTE — TELEPHONE ENCOUNTER
Reason for Call:  Other prescription    Detailed comments: Patient is requesting a refill on her sleeping medication. Patient is currently on amitriptyline (ELAVIL) 50 MG tablet, but patient is wondering if she could get it switched to something else because her current medication is not working. Patient states she does not have a PCP. Please advise.     Phone Number Patient can be reached at: Home number on file [phone] (home)    Best Time: Any    Can we leave a detailed message on this number? YES    Call taken on 9/21/2022 at 11:17 AM by Lina Wayne

## 2022-09-21 NOTE — NURSING NOTE
Chief Complaint   Patient presents with     Consult     Abdominal infection -- post-op procedure in [place] 9/1       Vitals:    09/21/22 1425   BP: 126/80   Pulse: 101   Temp: 98.1  F (36.7  C)   TempSrc: Oral   SpO2: 100%   Weight: 98 kg (216 lb 1.6 oz)       Body mass index is 31.65 kg/m .          CARYN MELENDREZ EMT

## 2022-09-21 NOTE — LETTER
"9/21/2022      RE: Liane Key  [address]  [place] [address]     Dear Colleague,    Thank you for referring your patient, Liane Key, to the [facility] PLASTIC AND RECONSTRUCTIVE SURGERY CLINIC [place] [facility]. Please see a copy of my visit note below.    Plastic and Reconstructive Surgery   9/21/2022    HPI:   Liane is a pleasant 52 year old female with PMHx Graves disease on thyroid replacement who presents with abdominal pain, flank pain and swelling following abdominoplasty, BBL and liposuction in [place] on September 1, 2022. She has not had fevers, chills, malaise, or nausea. She states her abdominoplasty was a \"tubeless\" abdominoplasty. However, nearly immediately following surgery she felt as though she had fluid and swelling to her abdomen. She has been following outpatient with massage therapy per her surgeon's suggestions. She presented to her PCP due to concern of fluid collection/seroma after an internet search. She had a CT scan performed which showed large fluid collection and was referred to [facility] following her imaging.     Medications:   Current Outpatient Medications   Medication     cephALEXin (KEFLEX) 500 MG capsule     levothyroxine (SYNTHROID/LEVOTHROID) 125 MCG tablet     Multiple Vitamins-Iron (ONE-TABLET-DAILY/IRON PO)     oxyCODONE (ROXICODONE) 5 MG tablet     UNABLE TO FIND     varenicline (CHANTIX) 1 MG tablet     No current facility-administered medications for this visit.     Allergies:   No Known Allergies    Medical History:  Past Medical History:   Diagnosis Date     Graves disease      Hypothyroidism following radioiodine therapy      Obesity      Syphilis 5/8/2015     Surgical History:  Past Surgical History:   Procedure Laterality Date     CHOLECYSTECTOMY  1998          HYSTERECTOMY, PAP NO LONGER INDICATED  07/13/2018     LAPAROSCOPIC ASSISTED HYSTERECTOMY VAGINAL  07/13/2018    " adenomyosis, fibroids      OPEN REDUCTION INTERNAL FIXATION ANKLE Left 1990          TUBAL LIGATION  1990     Family History:  No family history of bleeding or clotting problems or problems with anesthesia     ROS:   Review of systems negative except as noted above    Physical Exam:  /80   Pulse 101   Temp 98.1  F (36.7  C) (Oral)   Wt 98 kg (216 lb 1.6 oz)   LMP 06/07/2018   SpO2 100%   BMI 31.65 kg/m    Gen: well appearing. Afebrile. Breathing non-labored.   Abdomen: umbilical incision with small area of breakdown. Small area of skin breakdown superior to mons without tunneling, drainage. Mild tenderness with palpation of abdomen that radiates to bilateral flanks, left worse than right.   Back: bilateral stab incisions healing well, no dehiscence or breakdown. No redness or warmth     CT Abdomen/Pelvis 9/19/2022  IMPRESSION:   1.  Multiple subcutaneous fluid collections as described with the largest being low transverse and midline. No significant focal inflammatory response and likely these are all postoperative seromas, though superimposed infection can't be excluded    Assessment:   52 year old female s/p abdominoplasty at OSH now with multiple subcutaneous fluid collections as demonstrated on CT abdomen/pelvis from 9/19.     Plan:   Seroma aspiration in clinic  Fluid sent for culture  Plan to continue antibiotics per PCP  Await fluid culture results         Attending Attestation:  Agree with above. In brief, 53F ~3 weeks s/p drainless abdominoplasty p/w large suprapubic seroma with no signs of infection. Offered sterile in-clinic aspiration. Discussed risks of procedure with patient, including but not limited to: infection, bleeding, pain, injury to deeper structures including bowel, recurrent seroma. Despite these risks, patient consents to lower abdominal aspiration. After cleansing the skin over the seroma with chlorhexidine and using sterile gloves and a 21-gauge needle, 160 mL of brownish  translucent fluid was aspirated. This fluid was sent for culture. Agree with continuing current course of antibiotics. Provided ER precautions re: signs for infection. Strict compression with abdominal binder and ABD pads over the suprapubic area. Return to clinic in 1 week for re-evaluation.     Tonny Wilhelm MD, PhD  Staff Plastic Surgeon

## 2022-09-21 NOTE — TELEPHONE ENCOUNTER
Please call patient and schedule an appointment with a provider for this.  She also needs a annual exam.    Michaela RN,BSN  Triage Nurse  [facility]: [facility]  Ph: [phone]

## 2022-09-22 ENCOUNTER — OFFICE VISIT (OUTPATIENT)
Dept: FAMILY MEDICINE | Facility: CLINIC | Age: 52
End: 2022-09-22
Payer: COMMERCIAL

## 2022-09-22 VITALS
DIASTOLIC BLOOD PRESSURE: 60 MMHG | OXYGEN SATURATION: 100 % | RESPIRATION RATE: 26 BRPM | TEMPERATURE: 99 F | SYSTOLIC BLOOD PRESSURE: 104 MMHG | BODY MASS INDEX: 33.68 KG/M2 | WEIGHT: 214.6 LBS | HEART RATE: 105 BPM | HEIGHT: 67 IN

## 2022-09-22 DIAGNOSIS — S05.02XA ABRASION OF LEFT CORNEA, INITIAL ENCOUNTER: Primary | ICD-10-CM

## 2022-09-22 PROCEDURE — 99213 OFFICE O/P EST LOW 20 MIN: CPT | Performed by: FAMILY MEDICINE

## 2022-09-22 RX ORDER — POLYETHYLENE GLYCOL 3350 17 G/17G
POWDER, FOR SOLUTION ORAL
COMMUNITY
Start: 2022-09-13 | End: 2022-12-28

## 2022-09-22 RX ORDER — OFLOXACIN 3 MG/ML
1-2 SOLUTION/ DROPS OPHTHALMIC 4 TIMES DAILY
Qty: 5 ML | Refills: 0 | Status: SHIPPED | OUTPATIENT
Start: 2022-09-22 | End: 2022-09-27

## 2022-09-22 RX ORDER — AMOXICILLIN 250 MG
1 CAPSULE ORAL
COMMUNITY
Start: 2022-09-13 | End: 2022-12-28

## 2022-09-22 RX ORDER — PSEUDOEPHED/ACETAMINOPH/DIPHEN 30MG-500MG
1200 TABLET ORAL EVERY 6 HOURS PRN
COMMUNITY
Start: 2022-09-13

## 2022-09-22 ASSESSMENT — PAIN SCALES - GENERAL: PAINLEVEL: MODERATE PAIN (5)

## 2022-09-22 ASSESSMENT — ENCOUNTER SYMPTOMS: EYE PAIN: 1

## 2022-09-22 NOTE — TELEPHONE ENCOUNTER
Called patient and scheduled OV 10/3 and informed 8:40 check in. No further concerns at this time. Leda Staton MA

## 2022-09-22 NOTE — PROGRESS NOTES
Assessment & Plan     Abrasion of left cornea, initial encounter  After obtaining verbal consent from the patient.  I did apply one drop of Proparacaine to numb the left eye.  I examined the left eye under fluorescein light,  She had minor scratches at nasal side.  Otherwise, exam was normal.   There was no foreign body appreciated.  She tolerated well.    Advised patient not to wear her eye contact for 2 weeks.    Use eyedrops for the next 10 days.    Discussed with patient if symptom worsening to contact her eye doctor and follow-up with him.    - ofloxacin (OCUFLOX) 0.3 % ophthalmic solution; Place 1-2 drops Into the left eye 4 times daily  56}      Return in about 1 week (around 9/29/2022), or if symptoms worsen or fail to improve, for Follow up.    Diane Merlos MD  [facility]    John [place] is a 52 year old{presenting for the following health issues:  Eye Problem  Patient does wear an  eye contact.  She reports a week ago she injured her left eye, as she was trying to move her eye contact. it was getting better.  Yesterday she tried to wear her contact again, and it causes more irritation, and pain, .  This morning she has minium discharges.    No pain  with eye movement, she has no decrease in her visions.  No headache.    History of Present Illness       Reason for visit:  Scratch left eye taking contacts out  : last night.  Symptom intensity:  Mild  Symptom progression:  Improving  Had these symptoms before:  Yes  Has tried/received treatment for these symptoms:  Yes  Previous treatment was successful:  Yes  Prior treatment description:  Eye drops  What makes it worse:  Light  What makes it better:  Close eye    She eats 4 or more servings of fruits and vegetables daily.She consumes 2 sweetened beverage(s) daily.She exercises with enough effort to increase her heart rate 10 to 19 minutes per day.  She exercises with enough effort to increase her heart rate 5 days per  "week.   She is taking medications regularly.       Review of Systems   Constitutional, HEENT, cardiovascular, pulmonary, gi and gu systems are negative, except as otherwise noted.      Objective    Pulse 105   Temp 99  F (37.2  C) (Tympanic)   Resp 26   Ht 1.702 m (5' 7\")   Wt 97.3 kg (214 lb 9.6 oz)   LMP 06/07/2018   SpO2 100%   Breastfeeding No   BMI 33.61 kg/m    Body mass index is 33.61 kg/m .  Physical Exam   GENERAL: healthy, alert and no distress  EYES: Eyes grossly normal to inspection, PERRL, EOMI, visual fields normal and conjunctiva/corneas- conjunctival injection left eye, redness at nose side.  PSYCH: mentation appears normal, affect normal/bright    No orders of the defined types were placed in this encounter.      Diane Merlos MD            "

## 2022-09-22 NOTE — TELEPHONE ENCOUNTER
Spoke to pt and she has an appt already scheduled for 10/3/22  Jl Ortiz MA on 9/22/2022 at 11:54 AM

## 2022-09-26 DIAGNOSIS — S05.02XA ABRASION OF LEFT CORNEA, INITIAL ENCOUNTER: ICD-10-CM

## 2022-09-26 LAB — BACTERIA FLD CULT: NO GROWTH

## 2022-09-26 NOTE — TELEPHONE ENCOUNTER
Reason for Call:  Medication or medication refill:    Do you use a [facility]?  Name of the pharmacy and phone number for the current request:  Pharmacy pended    Name of the medication requested: ofloxacin (OCUFLOX) 0.3 % ophthalmic solution    Other request: Pt is asking for request because she misplace the eye drops and can not find it anywhere. Please call back once it has been approve    Can we leave a detailed message on this number? YES    Phone number patient can be reached at: Cell number on file:    Telephone Information:   Mobile [phone]       Best Time: anytime    Call taken on 9/26/2022 at 12:59 PM by Vladimir Tai

## 2022-09-27 RX ORDER — OFLOXACIN 3 MG/ML
1-2 SOLUTION/ DROPS OPHTHALMIC 4 TIMES DAILY
Qty: 5 ML | Refills: 0 | Status: SHIPPED | OUTPATIENT
Start: 2022-09-27 | End: 2022-12-28

## 2022-09-28 ENCOUNTER — OFFICE VISIT (OUTPATIENT)
Dept: PLASTIC SURGERY | Facility: CLINIC | Age: 52
End: 2022-09-28
Payer: COMMERCIAL

## 2022-09-28 ENCOUNTER — OFFICE VISIT (OUTPATIENT)
Dept: FAMILY MEDICINE | Facility: CLINIC | Age: 52
End: 2022-09-28

## 2022-09-28 VITALS
BODY MASS INDEX: 33.96 KG/M2 | OXYGEN SATURATION: 99 % | RESPIRATION RATE: 14 BRPM | DIASTOLIC BLOOD PRESSURE: 77 MMHG | SYSTOLIC BLOOD PRESSURE: 111 MMHG | WEIGHT: 216.8 LBS | TEMPERATURE: 97.2 F | HEART RATE: 104 BPM

## 2022-09-28 VITALS
WEIGHT: 215 LBS | DIASTOLIC BLOOD PRESSURE: 84 MMHG | SYSTOLIC BLOOD PRESSURE: 123 MMHG | HEART RATE: 102 BPM | BODY MASS INDEX: 33.74 KG/M2 | HEIGHT: 67 IN | OXYGEN SATURATION: 100 %

## 2022-09-28 DIAGNOSIS — S30.1XXD ABDOMINAL WALL SEROMA, SUBSEQUENT ENCOUNTER: Primary | ICD-10-CM

## 2022-09-28 DIAGNOSIS — S30.1XXA ABDOMINAL WALL SEROMA, INITIAL ENCOUNTER: ICD-10-CM

## 2022-09-28 DIAGNOSIS — G47.9 DIFFICULTY SLEEPING: ICD-10-CM

## 2022-09-28 DIAGNOSIS — M54.50 ACUTE BILATERAL LOW BACK PAIN WITHOUT SCIATICA: Primary | ICD-10-CM

## 2022-09-28 LAB — BACTERIA FLD CULT: NORMAL

## 2022-09-28 PROCEDURE — 99213 OFFICE O/P EST LOW 20 MIN: CPT | Performed by: STUDENT IN AN ORGANIZED HEALTH CARE EDUCATION/TRAINING PROGRAM

## 2022-09-28 PROCEDURE — 99213 OFFICE O/P EST LOW 20 MIN: CPT | Performed by: PHYSICIAN ASSISTANT

## 2022-09-28 RX ORDER — CYCLOBENZAPRINE HCL 10 MG
5-10 TABLET ORAL
Qty: 20 TABLET | Refills: 0 | Status: SHIPPED | OUTPATIENT
Start: 2022-09-28 | End: 2023-03-21

## 2022-09-28 RX ORDER — HYDROXYZINE HYDROCHLORIDE 25 MG/1
25 TABLET, FILM COATED ORAL
Qty: 40 TABLET | Refills: 0 | Status: SHIPPED | OUTPATIENT
Start: 2022-09-28 | End: 2023-09-20

## 2022-09-28 ASSESSMENT — PAIN SCALES - GENERAL
PAINLEVEL: MODERATE PAIN (5)
PAINLEVEL: NO PAIN (0)

## 2022-09-28 NOTE — PROGRESS NOTES
Assessment & Plan   Problem List Items Addressed This Visit        Nervous and Auditory    Acute bilateral low back pain without sciatica - Primary    Relevant Medications    cyclobenzaprine (FLEXERIL) 10 MG tablet      Other Visit Diagnoses     Difficulty sleeping        Relevant Medications    hydrOXYzine (ATARAX) 25 MG tablet          Liane Key is a 52 year old female who presents c/o low back pain. Doubt fracture/contusion/dislocation given no injury. Impression is strain. Sees plastics later today for recheck on abdominoplasty and seroma drainage, which she feels much better after. Will tx with analgesics otc, Flexeril for breakthrough pain, hydroxyzine as a sleep aid and follow up with us prn.  Complete history and physical exam as below. AF with normal VS aside from mild tachycardia.    DDx and Dx discussed with and explained to the pt to their satisfaction.  All questions were answered at this time. Pt expressed understanding of and agreement with this dx, tx, and plan. No further workup warranted and standard medication warnings given. I have given the patient a list of pertinent indications for re-evaluation. Will go to the Emergency Department if symptoms worsen or new concerning symptoms arise. Patient left in no apparent distress.     Prescription drug management  23 minutes spent on the date of the encounter doing chart review, history and exam, documentation and further activities per the note     See Patient Instructions    Return for a recheck as needed, or call 911/go to an ER anytime if worsening.    MARIO Ann  [facility] NITESH Rob is a 52 year old presenting for the following health issues:  RECHECK      HPI     F/U from 9/19/22 OV. F/U for Oxycodone. Patient stopped taking Oxycodone and Keflex. Only took it for 8 days. Sees plastics later today for follow up of perc seroma drainage last week that has greatly improved her symptoms.    Low back  pain is 4/10. Not currently on any pain meds. Back pain started in May of this year. No numbness/tingling, bowel/bladder symptoms, weakness or other symptoms.    Review of Systems   Constitutional, HEENT, cardiovascular, pulmonary, gi and gu systems are negative, except as otherwise noted.      Objective    /77   Pulse 104   Temp 97.2  F (36.2  C) (Tympanic)   Resp 14   Wt 98.3 kg (216 lb 12.8 oz)   LMP 06/07/2018   SpO2 99%   BMI 33.96 kg/m    Body mass index is 33.96 kg/m .  Physical Exam  Vitals and nursing note reviewed.   Constitutional:       General: She is not in acute distress.     Appearance: She is not ill-appearing or diaphoretic.   HENT:      Head: Normocephalic and atraumatic.   Eyes:      Conjunctiva/sclera: Conjunctivae normal.   Cardiovascular:      Rate and Rhythm: Normal rate and regular rhythm.      Heart sounds: Normal heart sounds. No murmur heard.    No friction rub. No gallop.   Pulmonary:      Effort: Pulmonary effort is normal. No respiratory distress.      Breath sounds: Normal breath sounds. No stridor. No wheezing, rhonchi or rales.   Abdominal:      General: Bowel sounds are normal. There is no distension.      Palpations: Abdomen is soft. There is no mass.      Tenderness: There is no abdominal tenderness. There is no guarding or rebound.      Hernia: No hernia is present.   Musculoskeletal:      Comments: No CVA or midline spinal tenderness. No overlying signs of trauma or infection.   Skin:     General: Skin is warm and dry.   Neurological:      General: No focal deficit present.      Mental Status: She is alert. Mental status is at baseline.      Comments: Able to toe lift, heel walk and knee bend.   Psychiatric:         Mood and Affect: Mood normal.         Behavior: Behavior normal.

## 2022-09-28 NOTE — PROGRESS NOTES
PRS    No issues.  No fevers or chills.  Pain is improved.  Has a small superficial wound, for which she has been using ointment.  She continues to use an abdominal binder.  She stopped taking oral antibiotics 3 days ago.    On exam, abdominal incision is intact with couple small areas of superficial skin injury near the midline.  No signs of infection.  No cellulitis.  No bulge.  Not tender in the suprapubic area and no noticeable fluid wave or palpable collection.    Cultures negative    A/P: 52-year-old 4 weeks status post abdominoplasty done in [place], complicated by lower abdominal seroma, now aspirated    -Since the remaining seroma is not visible, not palpable, asymptomatic, not associated with an infection, and not causing an aesthetic deformity, no additional aspiration is needed at this time  -Continue lower abdominal compression with abdominal binder  -Stop oral antibiotic  -Continue Aquaphor petroleum based ointment to the small areas with superficial skin injury  -Reiterated activity restrictions of 10 pounds of weight lifting for the next few weeks  -Return to clinic in 2-3 weeks for reevaluation    Tonny Wilhelm MD, PhD

## 2022-09-28 NOTE — LETTER
Date:September 28, 2022      Provider requested that no letter be sent. Do not send.       [facility]

## 2022-09-28 NOTE — LETTER
9/28/2022       RE: Liane Key  [address] [place] [address]     Dear Colleague,    Thank you for referring your patient, Liane Key, to the [facility] PLASTIC AND RECONSTRUCTIVE SURGERY CLINIC [place] at [facility]. Please see a copy of my visit note below.    PRS    No issues.  No fevers or chills.  Pain is improved.  Has a small superficial wound, for which she has been using ointment.  She continues to use an abdominal binder.  She stopped taking oral antibiotics 3 days ago.    On exam, abdominal incision is intact with couple small areas of superficial skin injury near the midline.  No signs of infection.  No cellulitis.  No bulge.  Not tender in the suprapubic area and no noticeable fluid wave or palpable collection.    Cultures negative    A/P: 52-year-old 4 weeks status post abdominoplasty done in [place], complicated by lower abdominal seroma, now aspirated    -Since the remaining seroma is not visible, not palpable, asymptomatic, not associated with an infection, and not causing an aesthetic deformity, no additional aspiration is needed at this time  -Continue lower abdominal compression with abdominal binder  -Stop oral antibiotic  -Continue Aquaphor petroleum based ointment to the small areas with superficial skin injury  -Reiterated activity restrictions of 10 pounds of weight lifting for the next few weeks  -Return to clinic in 2-3 weeks for reevaluation    Tonny Wilhelm MD, PhD          Again, thank you for allowing me to participate in the care of your patient.      Sincerely,    Tonny Wilhelm MD

## 2022-09-28 NOTE — NURSING NOTE
"Chief Complaint   Patient presents with     Follow Up       Vitals:    09/28/22 1210   BP: 123/84   BP Location: Left arm   Patient Position: Sitting   Cuff Size: Adult Regular   Pulse: 102   SpO2: 100%   Weight: 215 lb   Height: 5' 7\"       Body mass index is 33.67 kg/m .    Neil MURO-P    "

## 2022-09-28 NOTE — PATIENT INSTRUCTIONS
Robson Rob,    Thank you for allowing [facility] to manage your care.    If you develop worsening/changing symptoms at any time, please call 911 or go to the emergency department for evaluation.    I sent your prescriptions to your pharmacy.    For your pain, please use Tylenol 650mg every 6 hours.   Max acetaminophen (Tylenol) 4,000mg/24 hours    For severe back pain and difficulty sleeping not controlled by over the counter medications, please use cyclobenzaprine and hydroxyzine respectively as prescribed. Do not use these medications within 4 hours of each other, while driving, operating machinery, with other sedating medications, or while drinking alcohol as it will make you drowsy.    If you have any questions or concerns, please feel free to call us at [phone]    Sincerely,    Osmel Metzger PA-C    Did you know?      You can schedule a video visit for follow-up appointments as well as future appointments for certain conditions.  Please see the below link.     [email or web address]    If you have not already done so,  I encourage you to sign up for [email or web address] ([email or web address]).  This will allow you to review your results, securely communicate with a provider, and schedule virtual visits as well.

## 2022-10-03 ENCOUNTER — OFFICE VISIT (OUTPATIENT)
Dept: FAMILY MEDICINE | Facility: CLINIC | Age: 52
End: 2022-10-03
Payer: COMMERCIAL

## 2022-10-03 VITALS
HEART RATE: 90 BPM | BODY MASS INDEX: 33.49 KG/M2 | OXYGEN SATURATION: 100 % | WEIGHT: 213.8 LBS | TEMPERATURE: 97.6 F | DIASTOLIC BLOOD PRESSURE: 72 MMHG | SYSTOLIC BLOOD PRESSURE: 124 MMHG

## 2022-10-03 DIAGNOSIS — Z23 NEED FOR VACCINATION: ICD-10-CM

## 2022-10-03 DIAGNOSIS — G47.00 INSOMNIA, UNSPECIFIED TYPE: Primary | ICD-10-CM

## 2022-10-03 DIAGNOSIS — Z12.11 SCREEN FOR COLON CANCER: ICD-10-CM

## 2022-10-03 PROCEDURE — 99214 OFFICE O/P EST MOD 30 MIN: CPT | Mod: 25 | Performed by: FAMILY MEDICINE

## 2022-10-03 PROCEDURE — 90715 TDAP VACCINE 7 YRS/> IM: CPT | Performed by: FAMILY MEDICINE

## 2022-10-03 PROCEDURE — 90471 IMMUNIZATION ADMIN: CPT | Performed by: FAMILY MEDICINE

## 2022-10-03 RX ORDER — TRAZODONE HYDROCHLORIDE 50 MG/1
50 TABLET, FILM COATED ORAL AT BEDTIME
Qty: 90 TABLET | Refills: 1 | Status: SHIPPED | OUTPATIENT
Start: 2022-10-03 | End: 2023-06-06

## 2022-10-03 NOTE — PROGRESS NOTES
Assessment & Plan       ICD-10-CM    1. Insomnia, unspecified type  G47.00 traZODone (DESYREL) 50 MG tablet   2. Screen for colon cancer  Z12.11 Colonoscopy Screening  Referral   3. Need for vaccination  Z23 REVIEW OF HEALTH MAINTENANCE PROTOCOL ORDERS     TDAP VACCINE (Adacel, Boostrix)  [[id number]]         Review of external notes as documented elsewhere in note  I spent a total of 34 minutes on the day of the visit.   Time spent doing chart review, history and exam, documentation and further activities per the note       There are no Patient Instructions on file for this visit.    No follow-ups on file.    Joseph Killian MD  [facility] JAVIER Rob is a 52 year old, presenting for the following health issues:  Recheck Medication and RECHECK (Back pain)      HPI     Chief Complaint   Patient presents with     Recheck Medication     RECHECK     Back pain     Still experiencing low back pain was last seen 9/28/2022 no change. Pain started 05/2022  7/10 pain muscle relaxer has not been helping      Trouble sleeping  Hydroxyzine isn't helping      Review of Systems   Constitutional, HEENT, cardiovascular, pulmonary, gi and gu systems are negative, except as otherwise noted.      Objective    /72   Pulse 90   Temp 97.6  F (36.4  C) (Oral)   Wt 97 kg (213 lb 12.8 oz)   LMP 06/07/2018   SpO2 100%   BMI 33.49 kg/m    Body mass index is 33.49 kg/m .  Physical Exam  Vitals reviewed.   Constitutional:       General: She is not in acute distress.     Appearance: Normal appearance. She is well-developed.   HENT:      Head: Normocephalic and atraumatic.      Right Ear: External ear normal.      Left Ear: External ear normal.      Nose: Nose normal.   Eyes:      General: No scleral icterus.     Conjunctiva/sclera: Conjunctivae normal.   Cardiovascular:      Rate and Rhythm: Normal rate.   Pulmonary:      Effort: Pulmonary effort is normal.   Musculoskeletal:          General: No deformity. Normal range of motion.      Cervical back: Normal range of motion.   Skin:     General: Skin is warm and dry.      Findings: No rash.   Neurological:      Mental Status: She is alert and oriented to person, place, and time.   Psychiatric:         Behavior: Behavior normal.         Thought Content: Thought content normal.         Judgment: Judgment normal.

## 2022-10-04 ENCOUNTER — TELEPHONE (OUTPATIENT)
Dept: GASTROENTEROLOGY | Facility: CLINIC | Age: 52
End: 2022-10-04

## 2022-10-04 NOTE — TELEPHONE ENCOUNTER
Screening Questions    BlueKIND OF PREP RedLOCATION [review exclusion criteria] GreenSEDATION TYPE      NHave you had a positive covid test in the last 90 days?   If yes, what date?        Y Are you able to give consent for your medical care?  (Sedation review/consideration needed)      Y Are you active on mychart?       MEDICA What insurance is in the chart?        RICH MCARTHUR Ordering/Referring Provider:        33.4 BMI [BMI OVER 40-EXTENDED PREP]  BMI OVER 40 NEED PAC EVALUATION FOR UPU     Respiratory Screening:  [If yes to any of the following HOSPITAL setting only]     N      Do you use daily home oxygen?   N      Do you have mod to severe Obstructive Sleep Apnea? Hospital only - Ok at [facility]   N      Do you have Pulmonary Hypertension? NEED PAC APPT AT [facility]     N      Do you have UNCONTROLLED asthma?         N Have you had a heart or lung transplant?          N Are you currently on dialysis? [If yes, G-PREP & HOSPITAL setting only]        N  Do you have chronic kidney disease? [If yes, G-PREP]       N  Do you have a diagnosis of diabetes?[If yes, G-PREP]       N Have you had a stroke or Transient ischemic attack (TIA - aka  mini stroke ) within 6 months? (If yes, please review exclusion criteria)         N  In the past 6 months, have you had any heart related issues including cardiomyopathy or heart attack?          N  If yes, did it require cardiac stenting or other implantable device?          N Do you have any implantable devices in your body (pacemaker, defib, LVAD)? (If yes, please review exclusion criteria)       N Do you take nitroglycerin?          N If yes, how often?  (If yes, HOSPITAL setting ONLY)       N Are you currently taking any blood thinners?           [IF YES, INFORM PATIENT TO FOLLOW UP W/ ORDERING PROVIDER FOR BRIDGING INSTRUCTIONS]        N  Do you take Phentermine?      Yes-> Hold for 7 days before procedure.  Please consult your prescribing provider if  you have questions about holding this medication.       N Are you taking any prescription pain medications on a routine schedule? [EXTENDED PREP AND MAC]            [FEMALES] are you currently pregnant?            If yes, how many weeks? [Greater than 12 weeks, OR NEEDED]       N Any chemical dependencies such as alcohol, street drugs, or methadone? [If yes, MAC]       N Any history of post-traumatic stress syndrome, severe anxiety or history of psychosis? [If yes, MAC]       Y Can you transfer from bed to chair? (If NO, please HOSPITAL setting  only)        N  On a regular basis do you go 3-5 days between bowel movements?[ If yes, EXTENDED PREP.]        Preferred LOC[facility] Pharm[facility] for Pre Prescription[facility]                            Scheduling Details       Caller:   (Please ask for phone number if not scheduled by patient)    Type of Procedure Scheduled: Lower Endoscopy [Colonoscopy]    GPREP Which Colonoscopy Prep was Sent:   IGOR CF PATIENTS & GROEN'S PATIENTS NEEDS EXTENDED PREP    Date of Procedure: 11/28  Surgeon: JOSE MARIA  Location:     Sedation Type: CS    Conscious Sedation- Needs  for 6 hours after the procedure  MAC/General-Needs  for 24 hours after procedure    N Pre-op Required at [facility] [place], [place] and OR for MAC sedation:        (Advise patient they will need a pre-op WITH IN 30 DAYS prior to procedure -)      Y Informed patient they will need an adult          Cannot take any type of public or medical transportation alone    Y Confirmed Nurse will call to complete assessment     HOME Pre-Procedure Covid test to be completed [[facility] PCR Testing Required]       Additional comments:

## 2022-10-26 ENCOUNTER — OFFICE VISIT (OUTPATIENT)
Dept: PLASTIC SURGERY | Facility: CLINIC | Age: 52
End: 2022-10-26
Payer: COMMERCIAL

## 2022-10-26 VITALS
HEART RATE: 100 BPM | TEMPERATURE: 98.1 F | SYSTOLIC BLOOD PRESSURE: 148 MMHG | DIASTOLIC BLOOD PRESSURE: 94 MMHG | OXYGEN SATURATION: 100 %

## 2022-10-26 DIAGNOSIS — S30.1XXD ABDOMINAL WALL SEROMA, SUBSEQUENT ENCOUNTER: Primary | ICD-10-CM

## 2022-10-26 PROCEDURE — 99213 OFFICE O/P EST LOW 20 MIN: CPT | Performed by: STUDENT IN AN ORGANIZED HEALTH CARE EDUCATION/TRAINING PROGRAM

## 2022-10-26 ASSESSMENT — PAIN SCALES - GENERAL: PAINLEVEL: MODERATE PAIN (5)

## 2022-10-26 NOTE — PROGRESS NOTES
PRS    No major issues, except lateral scars with discomfort and patient feels lumps along lower incision. She still wears compression garment.     On exam, bilateral immature scars that are mildly tender, but no Tinel's. Lower abdominal areas of firm lumps c/w fat necrosis. No obvious recurrent seroma.     A/P: 52F 8 weeks s/p abdominoplasty done in [place], complicated by lower abdominal seroma, now aspirated without clear evidence of recurrence, with some fat necrosis and tender scarring    -Encouraged patient to massage the scars with Bio-oil and continue scar treatment. This will help reduce the tenderness of the scarring.   -Encouraged patient to massage palpable lumps of lower abdomen as this may soften the fat necrosis/scars  -Continue compression garment at nighttime only. Swelling can persist for months.   -Can obtain US of abdomen to evaluate for fat necrosis vs recurrent seroma  -Explained that if lumps persist beyond 12 months, these will likely stay and may need additional surgery to excise.   -Return to clinic in 4 weeks following US  -A total of 20 minutes was devoted to review of chart, direct face-to-face patient counseling and documentation during this encounter, exclusive of any procedure performed.    Tonny Wilhelm MD, PhD

## 2022-10-26 NOTE — LETTER
10/26/2022       RE: Liane Key  [address] [place] [address]     Dear Colleague,    Thank you for referring your patient, Liane Key, to the [facility] PLASTIC AND RECONSTRUCTIVE SURGERY CLINIC [place] at [facility]. Please see a copy of my visit note below.    PRS    No major issues, except lateral scars with discomfort and patient feels lumps along lower incision. She still wears compression garment.     On exam, bilateral immature scars that are mildly tender, but no Tinel's. Lower abdominal areas of firm lumps c/w fat necrosis. No obvious recurrent seroma.     A/P: 52F 8 weeks s/p abdominoplasty done in [place], complicated by lower abdominal seroma, now aspirated without clear evidence of recurrence, with some fat necrosis and tender scarring    -Encouraged patient to massage the scars with Bio-oil and continue scar treatment. This will help reduce the tenderness of the scarring.   -Encouraged patient to massage palpable lumps of lower abdomen as this may soften the fat necrosis/scars  -Continue compression garment at nighttime only. Swelling can persist for months.   -Can obtain US of abdomen to evaluate for fat necrosis vs recurrent seroma  -Explained that if lumps persist beyond 12 months, these will likely stay and may need additional surgery to excise.   -Return to clinic in 4 weeks following US  -A total of 20 minutes was devoted to review of chart, direct face-to-face patient counseling and documentation during this encounter, exclusive of any procedure performed.              Again, thank you for allowing me to participate in the care of your patient.      Sincerely,    Tonny Wilhelm MD

## 2022-10-26 NOTE — NURSING NOTE
Chief Complaint   Patient presents with     Surgical Followup       Vitals:    10/26/22 1321   BP: (!) 148/94   Pulse: 100   Temp: 98.1  F (36.7  C)   TempSrc: Oral   SpO2: 100%       There is no height or weight on file to calculate BMI.          CARYN MELENDREZ EMT

## 2022-10-31 ENCOUNTER — LAB (OUTPATIENT)
Dept: URGENT CARE | Facility: URGENT CARE | Age: 52
End: 2022-10-31
Payer: COMMERCIAL

## 2022-10-31 DIAGNOSIS — Z20.822 SUSPECTED COVID-19 VIRUS INFECTION: ICD-10-CM

## 2022-10-31 PROCEDURE — U0003 INFECTIOUS AGENT DETECTION BY NUCLEIC ACID (DNA OR RNA); SEVERE ACUTE RESPIRATORY SYNDROME CORONAVIRUS 2 (SARS-COV-2) (CORONAVIRUS DISEASE [COVID-19]), AMPLIFIED PROBE TECHNIQUE, MAKING USE OF HIGH THROUGHPUT TECHNOLOGIES AS DESCRIBED BY CMS-2020-01-R: HCPCS

## 2022-10-31 PROCEDURE — 99207 PR NO CHARGE LOS: CPT

## 2022-10-31 PROCEDURE — U0005 INFEC AGEN DETEC AMPLI PROBE: HCPCS

## 2022-11-01 ENCOUNTER — OFFICE VISIT (OUTPATIENT)
Dept: FAMILY MEDICINE | Facility: CLINIC | Age: 52
End: 2022-11-01
Payer: COMMERCIAL

## 2022-11-01 ENCOUNTER — TELEPHONE (OUTPATIENT)
Dept: FAMILY MEDICINE | Facility: CLINIC | Age: 52
End: 2022-11-01

## 2022-11-01 VITALS
DIASTOLIC BLOOD PRESSURE: 80 MMHG | RESPIRATION RATE: 20 BRPM | HEART RATE: 107 BPM | SYSTOLIC BLOOD PRESSURE: 110 MMHG | OXYGEN SATURATION: 100 % | BODY MASS INDEX: 32.67 KG/M2 | TEMPERATURE: 100.7 F | WEIGHT: 208.6 LBS

## 2022-11-01 DIAGNOSIS — J06.9 UPPER RESPIRATORY TRACT INFECTION, UNSPECIFIED TYPE: Primary | ICD-10-CM

## 2022-11-01 LAB
FLUAV AG SPEC QL IA: POSITIVE
FLUBV AG SPEC QL IA: NEGATIVE
SARS-COV-2 RNA RESP QL NAA+PROBE: NEGATIVE

## 2022-11-01 PROCEDURE — U0005 INFEC AGEN DETEC AMPLI PROBE: HCPCS | Performed by: FAMILY MEDICINE

## 2022-11-01 PROCEDURE — 99214 OFFICE O/P EST MOD 30 MIN: CPT | Mod: CS | Performed by: FAMILY MEDICINE

## 2022-11-01 PROCEDURE — U0003 INFECTIOUS AGENT DETECTION BY NUCLEIC ACID (DNA OR RNA); SEVERE ACUTE RESPIRATORY SYNDROME CORONAVIRUS 2 (SARS-COV-2) (CORONAVIRUS DISEASE [COVID-19]), AMPLIFIED PROBE TECHNIQUE, MAKING USE OF HIGH THROUGHPUT TECHNOLOGIES AS DESCRIBED BY CMS-2020-01-R: HCPCS | Performed by: FAMILY MEDICINE

## 2022-11-01 PROCEDURE — 87804 INFLUENZA ASSAY W/OPTIC: CPT | Performed by: FAMILY MEDICINE

## 2022-11-01 PROCEDURE — 87804 INFLUENZA ASSAY W/OPTIC: CPT | Mod: 59 | Performed by: FAMILY MEDICINE

## 2022-11-01 RX ORDER — AZITHROMYCIN 250 MG/1
TABLET, FILM COATED ORAL
Qty: 6 TABLET | Refills: 0 | Status: SHIPPED | OUTPATIENT
Start: 2022-11-01 | End: 2022-11-06

## 2022-11-01 ASSESSMENT — ENCOUNTER SYMPTOMS
COUGH: 1
NAUSEA: 0
CHILLS: 0
HEMATURIA: 0
BREAST MASS: 0
ARTHRALGIAS: 0
SHORTNESS OF BREATH: 0
APPETITE CHANGE: 1
WHEEZING: 0
JOINT SWELLING: 0
MYALGIAS: 1
NERVOUS/ANXIOUS: 0
CONSTIPATION: 0
DIZZINESS: 0
FATIGUE: 1
FREQUENCY: 0
PALPITATIONS: 0
HEADACHES: 0
BACK PAIN: 1
DYSURIA: 0
SORE THROAT: 0
WEAKNESS: 0
HEARTBURN: 0
DIARRHEA: 0
ABDOMINAL PAIN: 0
EYE PAIN: 0
HEMATOCHEZIA: 0
FEVER: 1
PARESTHESIAS: 0

## 2022-11-01 ASSESSMENT — PAIN SCALES - GENERAL: PAINLEVEL: EXTREME PAIN (9)

## 2022-11-01 NOTE — PROGRESS NOTES
1. Upper respiratory tract infection, unspecified type  May continue ibuprofen and tylenol.  Most recent CXR and previous labs reviewed.   - Influenza A & B Antigen - Clinic Collect  - azithromycin (ZITHROMAX) 250 MG tablet; Take 2 tablets (500 mg) by mouth daily for 1 day, THEN 1 tablet (250 mg) daily for 4 days.  Dispense: 6 tablet; Refill: 0  - Symptomatic; Yes; 11/9/2022 COVID-19 Virus (Coronavirus) by PCR Nasopharyngeal        Subjective   Liane is a 52 year old presenting for the following health issues:  ER F/U      HPI     ED/UC Followup:    Facility:  [facility] ER  Date of visit: 10/29/2022  Reason for visit:   Person Under Investigation (PUI)   Cough   Chills   Diarrhea   Current Status: Still having cough, body aches, vomiting, low back pain    1. ER f/u 10/29/22: Patient was seen 10/29 at [facility].  Last Wednesday, patient developed running nose and sneezing.  Granddaughter was sick (vomiting and tired).  On Friday, her symptoms got worse.  Hot and chills.  Decreased appetite.  Trying eating healthy.  Productive cough.  States of muscle aches.  Had CXR which was negative.  Treated with cough medications and tylenol.  Went to urgent care in which COVID-19 test was negative.  As of today, continues body aches.  Patient is worn down.  Lower back pain.  History of tummy tuck on 9/1/22.  Continues to have cough symptoms.  Body aches with shower.  Prior smoker.  Feels like pins and needles throughout body.  Fever has slightly improved.  Loss of appetite.  States of diarrhea symptoms which is off and on.     Review of Systems   Constitutional: Positive for appetite change, fatigue and fever. Negative for chills.   HENT: Negative for congestion, ear pain, hearing loss and sore throat.    Eyes: Negative for pain and visual disturbance.   Respiratory: Positive for cough. Negative for shortness of breath and wheezing.    Cardiovascular: Negative for chest pain, palpitations and peripheral edema.    Gastrointestinal: Negative for abdominal pain, constipation, diarrhea, heartburn, hematochezia and nausea.   Breasts:  Negative for tenderness, breast mass and discharge.   Genitourinary: Negative for dysuria, frequency, genital sores, hematuria, pelvic pain, urgency, vaginal bleeding and vaginal discharge.   Musculoskeletal: Positive for back pain (low) and myalgias. Negative for arthralgias and joint swelling.   Skin: Negative for rash.   Neurological: Negative for dizziness, weakness, headaches and paresthesias.   Psychiatric/Behavioral: Negative for mood changes. The patient is not nervous/anxious.           Objective    /80 (BP Location: Left arm, Patient Position: Sitting, Cuff Size: Adult Regular)   Pulse 107   Temp (!) 100.7  F (38.2  C) (Tympanic)   Resp 20   Wt 94.6 kg (208 lb 9.6 oz)   LMP 06/07/2018   SpO2 100%   BMI 32.67 kg/m    Body mass index is 32.67 kg/m .  Physical Exam  Constitutional:       General: She is not in acute distress.     Appearance: She is well-developed and well-nourished.   HENT:      Head: Normocephalic and atraumatic.      Right Ear: Tympanic membrane normal.      Left Ear: Tympanic membrane normal.      Nose: Nose normal.   Eyes:      Extraocular Movements: EOM normal.      Conjunctiva/sclera: Conjunctivae normal.   Neck:      Trachea: No tracheal deviation.   Cardiovascular:      Rate and Rhythm: Normal rate and regular rhythm.      Heart sounds: Normal heart sounds.   Pulmonary:      Effort: Pulmonary effort is normal. No respiratory distress.      Comments: Mild course breath sounds bilaterally  Musculoskeletal:         General: Normal range of motion.      Cervical back: Normal range of motion.   Skin:     General: Skin is warm.   Neurological:      Mental Status: She is alert and oriented to person, place, and time.   Psychiatric:         Mood and Affect: Mood and affect normal.         Behavior: Behavior normal.

## 2022-11-01 NOTE — TELEPHONE ENCOUNTER
----- Message from Ponce Cevallos DO sent at 11/1/2022  3:56 PM CDT -----  Please call patient. Recent influenza screen is positive for Influenza A.  This is most likely causing her symptoms.  Because her symptoms has been greater than 48 hours, she does not qualify for Tamiflu.  I am comfortable with continuing azithromycin.

## 2022-11-01 NOTE — PATIENT INSTRUCTIONS
Isra Rob,    Thank you for allowing [facility] to manage your care.    I sent your prescriptions to your pharmacy.    For your convenience, test results are released as soon as they are available  Please allow 1-2 business days for me to send you a comment about your results.  If not done so, I encourage you to login into [employer] ([email or web address] to review your results in real time.     If you have any questions or concerns, please feel free to call us at [phone].    Sincerely,    Dr. Cevallos    Did you know?      You can schedule a video visit for follow-up appointments as well as future appointments for certain conditions.  Please see the below link.     [email or web address]    If you have not already done so,  I encourage you to sign up for [email or web address] ([email or web address]).  This will allow you to review your results, securely communicate with a provider, and schedule virtual visits as well.

## 2022-11-01 NOTE — TELEPHONE ENCOUNTER
Attempted to call patient at home/mobile number, no answer, left message on voicemail; patient was instructed to return call to [facility] at [phone].    Maritza Bush RN  [facility]

## 2022-11-01 NOTE — LETTER
November 1, 2022      Liane Key  [address] [place] [address]        To Whom It May Concern:    Liane Key was seen in our clinic. She may return to work without restriction 11/7/22.  Please excuse on 10/31-11/4.  Please excuse her granddaughter, Sindhu Issa, from school during time period as well.       Sincerely,      Dr. Ponce Cevallos

## 2022-11-02 ENCOUNTER — TELEPHONE (OUTPATIENT)
Dept: NURSING | Facility: CLINIC | Age: 52
End: 2022-11-02

## 2022-11-02 LAB — SARS-COV-2 RNA RESP QL NAA+PROBE: NEGATIVE

## 2022-11-02 NOTE — TELEPHONE ENCOUNTER
I reviewed the directives from Dr. Cevallos with patient and she verbalized a good understanding.     Nelly Venegas RN BSN  [facility]

## 2022-11-02 NOTE — TELEPHONE ENCOUNTER
Patient returning call. Reviewed telephone encounter from yesterday with patient positive for Influenza. Patient expressed understanding.     Rosi Moise RN 11/02/22 11:30 AM    Health Triage Nurse Advisor

## 2022-11-03 PROBLEM — M54.50 ACUTE BILATERAL LOW BACK PAIN WITHOUT SCIATICA: Status: RESOLVED | Noted: 2022-06-22 | Resolved: 2022-11-03

## 2022-11-17 RX ORDER — BISACODYL 5 MG
TABLET, DELAYED RELEASE (ENTERIC COATED) ORAL
Qty: 4 TABLET | Refills: 0 | Status: SHIPPED | OUTPATIENT
Start: 2022-11-17 | End: 2022-12-28

## 2022-11-20 ENCOUNTER — HEALTH MAINTENANCE LETTER (OUTPATIENT)
Age: 52
End: 2022-11-20

## 2022-11-28 ENCOUNTER — HOSPITAL ENCOUNTER (OUTPATIENT)
Facility: AMBULATORY SURGERY CENTER | Age: 52
Discharge: HOME | End: 2022-11-28
Attending: INTERNAL MEDICINE | Admitting: INTERNAL MEDICINE
Payer: COMMERCIAL

## 2022-11-28 VITALS
OXYGEN SATURATION: 100 % | RESPIRATION RATE: 16 BRPM | SYSTOLIC BLOOD PRESSURE: 137 MMHG | DIASTOLIC BLOOD PRESSURE: 100 MMHG | TEMPERATURE: 98 F | HEART RATE: 72 BPM

## 2022-11-28 DIAGNOSIS — Z12.11 SCREEN FOR COLON CANCER: Primary | ICD-10-CM

## 2022-11-28 LAB — COLONOSCOPY: NORMAL

## 2022-11-28 PROCEDURE — G8918 PT W/O PREOP ORDER IV AB PRO: HCPCS

## 2022-11-28 PROCEDURE — 45378 DIAGNOSTIC COLONOSCOPY: CPT

## 2022-11-28 PROCEDURE — G8907 PT DOC NO EVENTS ON DISCHARG: HCPCS

## 2022-11-28 RX ORDER — PROCHLORPERAZINE MALEATE 10 MG
10 TABLET ORAL EVERY 6 HOURS PRN
Status: DISCONTINUED | OUTPATIENT
Start: 2022-11-28 | End: 2022-11-29 | Stop reason: HOSPADM

## 2022-11-28 RX ORDER — ONDANSETRON 2 MG/ML
4 INJECTION INTRAMUSCULAR; INTRAVENOUS EVERY 6 HOURS PRN
Status: DISCONTINUED | OUTPATIENT
Start: 2022-11-28 | End: 2022-11-29 | Stop reason: HOSPADM

## 2022-11-28 RX ORDER — NALOXONE HYDROCHLORIDE 0.4 MG/ML
0.2 INJECTION, SOLUTION INTRAMUSCULAR; INTRAVENOUS; SUBCUTANEOUS
Status: DISCONTINUED | OUTPATIENT
Start: 2022-11-28 | End: 2022-11-29 | Stop reason: HOSPADM

## 2022-11-28 RX ORDER — ONDANSETRON 4 MG/1
4 TABLET, ORALLY DISINTEGRATING ORAL EVERY 6 HOURS PRN
Status: DISCONTINUED | OUTPATIENT
Start: 2022-11-28 | End: 2022-11-29 | Stop reason: HOSPADM

## 2022-11-28 RX ORDER — ONDANSETRON 2 MG/ML
4 INJECTION INTRAMUSCULAR; INTRAVENOUS
Status: DISCONTINUED | OUTPATIENT
Start: 2022-11-28 | End: 2022-11-29 | Stop reason: HOSPADM

## 2022-11-28 RX ORDER — FLUMAZENIL 0.1 MG/ML
0.2 INJECTION, SOLUTION INTRAVENOUS
Status: DISCONTINUED | OUTPATIENT
Start: 2022-11-28 | End: 2022-11-29 | Stop reason: HOSPADM

## 2022-11-28 RX ORDER — FENTANYL CITRATE 50 UG/ML
INJECTION, SOLUTION INTRAMUSCULAR; INTRAVENOUS PRN
Status: DISCONTINUED | OUTPATIENT
Start: 2022-11-28 | End: 2022-11-28 | Stop reason: HOSPADM

## 2022-11-28 RX ORDER — LIDOCAINE 40 MG/G
CREAM TOPICAL
Status: DISCONTINUED | OUTPATIENT
Start: 2022-11-28 | End: 2022-11-29 | Stop reason: HOSPADM

## 2022-11-28 RX ORDER — NALOXONE HYDROCHLORIDE 0.4 MG/ML
0.4 INJECTION, SOLUTION INTRAMUSCULAR; INTRAVENOUS; SUBCUTANEOUS
Status: DISCONTINUED | OUTPATIENT
Start: 2022-11-28 | End: 2022-11-29 | Stop reason: HOSPADM

## 2022-11-28 NOTE — H&P
[facility] Anesthesia Pre-op History and Physical    Liane Key MRN# [id number]   Age: 52 year old YOB: 1970            Date of Exam 11/28/2022           Primary care provider: No Ref-Primary, Physician         Chief Complaint and/or Reason for Procedure:     CRC screening - first colonoscopy         Active problem list:     Patient Active Problem List    Diagnosis Date Noted     History of Graves' disease 02/19/2019     Priority: Medium     Elevated blood pressure reading without diagnosis of hypertension 06/12/2018     Priority: Medium     Class 1 obesity due to excess calories without serious comorbidity with body mass index (BMI) of 31.0 to 31.9 in adult      Priority: Medium     Hypothyroidism following radioiodine therapy 09/20/2012     Priority: Medium     CARDIOVASCULAR SCREENING; LDL GOAL LESS THAN 160 03/01/2012     Priority: Medium     Chronic insomnia 03/10/2022     Priority: Low            Medications (include herbals and vitamins):   Any Plavix use in the last 7 days? No     Current Outpatient Medications   Medication Sig     bisacodyl (DULCOLAX) 5 MG EC tablet Take 2 tablets at 3 pm the day before your procedure. If your procedure is before 11 am, take 2 additional tablets at 11 pm. If your procedure is after 11 am, take 2 additional tablets at 6 am. For additional instructions refer to your colonoscopy prep instructions.     polyethylene glycol (GOLYTELY) 236 g suspension The night before the exam at 6 pm drink an 8-ounce glass every 15 minutes until the jug is half empty. If you arrive before 11 AM: Drink the other half of the Golytely jug at 11 PM night before procedure. If you arrive after 11 AM: Drink the other half of the Golytely jug at 6 AM day of procedure. For additional instructions refer to your colonoscopy prep instructions.     ACETAMINOPHEN EXTRA STRENGTH 500 MG tablet Take 1,200 mg by mouth every 6 hours as needed     cyclobenzaprine (FLEXERIL) 10 MG tablet  Take 0.5-1 tablets (5-10 mg) by mouth nightly as needed for muscle spasms     hydrOXYzine (ATARAX) 25 MG tablet Take 1 tablet (25 mg) by mouth at bedtime as needed, may repeat once (difficulty sleeping.) (Patient not taking: Reported on 11/1/2022)     levothyroxine (SYNTHROID/LEVOTHROID) 125 MCG tablet Take 1 tablet (125 mcg) by mouth daily     Multiple Vitamins-Iron (ONE-TABLET-DAILY/IRON PO)      ofloxacin (OCUFLOX) 0.3 % ophthalmic solution Place 1-2 drops Into the left eye 4 times daily (Patient not taking: Reported on 10/3/2022)     oxyCODONE (ROXICODONE) 5 MG tablet Take 0.5-1 tablets (2.5-5 mg) by mouth every 6 hours as needed for breakthrough pain (Patient not taking: Reported on 9/28/2022)     polyethylene glycol (MIRALAX) 17 GM/Dose powder  (Patient not taking: Reported on 11/1/2022)     senna-docusate (SENOKOT-S/PERICOLACE) 8.6-50 MG tablet Take 1 tablet by mouth (Patient not taking: Reported on 11/1/2022)     traZODone (DESYREL) 50 MG tablet Take 1 tablet (50 mg) by mouth At Bedtime     UNABLE TO FIND MEDICATION NAME: stool softner unknown name per patient (Patient not taking: Reported on 11/1/2022)     varenicline (CHANTIX) 1 MG tablet Take 1 tablet (1 mg) by mouth 2 times daily     Current Facility-Administered Medications   Medication     lidocaine (LMX4) kit     lidocaine 1 % 0.1-1 mL     ondansetron (ZOFRAN) injection 4 mg     sodium chloride (PF) 0.9% PF flush 3 mL     sodium chloride (PF) 0.9% PF flush 3 mL             Allergies:    No Known Allergies  Allergy to Latex? No  Allergy to tape?   No  Intolerances:             Physical Exam:   All vitals have been reviewed  Patient Vitals for the past 8 hrs:   BP Temp Temp src Pulse Resp SpO2   11/28/22 1257 (!) 152/97 98  F (36.7  C) Temporal 77 16 97 %     No intake/output data recorded.  Lungs:   No increased work of breathing, good air exchange, clear to auscultation bilaterally, no crackles or wheezing     Cardiovascular:   normal S1 and S2              Lab / Radiology Results:            Anesthetic risk and/or ASA classification:     2  Negrita Medina DO

## 2022-12-21 DIAGNOSIS — F17.200 NICOTINE DEPENDENCE, UNCOMPLICATED, UNSPECIFIED NICOTINE PRODUCT TYPE: ICD-10-CM

## 2022-12-23 RX ORDER — VARENICLINE TARTRATE 1 MG/1
1 TABLET, FILM COATED ORAL 2 TIMES DAILY
Qty: 60 TABLET | Refills: 1 | Status: SHIPPED | OUTPATIENT
Start: 2022-12-23 | End: 2023-03-21

## 2022-12-28 ENCOUNTER — VIRTUAL VISIT (OUTPATIENT)
Dept: FAMILY MEDICINE | Facility: OTHER | Age: 52
End: 2022-12-28
Payer: COMMERCIAL

## 2022-12-28 DIAGNOSIS — B35.3 TINEA PEDIS OF RIGHT FOOT: Primary | ICD-10-CM

## 2022-12-28 PROCEDURE — 99213 OFFICE O/P EST LOW 20 MIN: CPT | Mod: 95 | Performed by: PHYSICIAN ASSISTANT

## 2022-12-28 RX ORDER — HYDROXYZINE PAMOATE 25 MG/1
25 CAPSULE ORAL 3 TIMES DAILY PRN
Qty: 20 CAPSULE | Refills: 0 | Status: SHIPPED | OUTPATIENT
Start: 2022-12-28 | End: 2023-03-21

## 2022-12-28 RX ORDER — KETOCONAZOLE 20 MG/G
CREAM TOPICAL 2 TIMES DAILY
Qty: 30 G | Refills: 0 | Status: SHIPPED | OUTPATIENT
Start: 2022-12-28 | End: 2023-03-21

## 2022-12-28 NOTE — PATIENT INSTRUCTIONS
Will prescribe ketoconazole cream to use twice daily until symptoms are gone.  Will also prescribe hydroxyzine to take as needed for itching but this can make you drowsy.  Keep area clean and dry and try to leave open to air.  If not improving or worsening, you should be seen.

## 2022-12-28 NOTE — PROGRESS NOTES
"Liane is a 52 year old who is being evaluated via a billable telephone visit.      What phone number would you like to be contacted at? [phone]  How would you like to obtain your AVS? [facility]  Distant Location (provider location):  Off-site    Assessment & Plan       ICD-10-CM    1. Tinea pedis of right foot  B35.3 ketoconazole (NIZORAL) 2 % external cream     hydrOXYzine (VISTARIL) 25 MG capsule          Unfortunately, we could not get the video visit to work today but based on her description of the symptoms, this sounds like tinea pedis. Will prescribe ketoconazole cream to use twice daily until symptoms have resolved. Will also prescribe hydroxyzine to use as needed for itching but recommend she consider this at night first as it can cause drowsiness. She should wash the foot with warm, soapy water 1-2 times daily and pat dry. I prefer she keep her foot open to air when possible as occlusive socks and shoes can worsen the fungal infection. I recommend she avoid scratching the area as this can lead to potential infection. If not improving or worsening, she needs to be seen.         VÍCTOR Cano [facility] TEOFILO Rob is a 52 year old female, presenting for the following health issues:  No chief complaint on file.      HPI     For the past week, she has noticed an itchy area beneath her 3rd through 5th toes of the right foot with some \"small bumps\". She has been scratching at the area due to the itching and now it feels like there is a \"cut\" in this area. She has been using over the counter antifungal cream and anti-itch cream with short term benefit but then symptoms return. She has been washing the area and keep her sock on during the day. She describes some skin maceration but denies any history of athlete's foot. She denies any warmth or purulent drainage.       Review of Systems   Constitutional, HEENT, cardiovascular, pulmonary, gi and gu systems are " negative, except as otherwise noted.      Objective           Vitals:  No vitals were obtained today due to virtual visit.    Physical Exam   healthy, alert and no distress  PSYCH: Alert and oriented times 3; coherent speech, normal   rate and volume, able to articulate logical thoughts, able   to abstract reason, no tangential thoughts, no hallucinations   or delusions  Her affect is normal  RESP: No cough, no audible wheezing, able to talk in full sentences  Remainder of exam unable to be completed due to telephone visits        Phone call duration: 10 minutes

## 2023-01-18 ENCOUNTER — OFFICE VISIT (OUTPATIENT)
Dept: FAMILY MEDICINE | Facility: CLINIC | Age: 53
End: 2023-01-18
Payer: COMMERCIAL

## 2023-01-18 VITALS
BODY MASS INDEX: 31.83 KG/M2 | HEIGHT: 68 IN | TEMPERATURE: 98.7 F | DIASTOLIC BLOOD PRESSURE: 53 MMHG | SYSTOLIC BLOOD PRESSURE: 139 MMHG | WEIGHT: 210 LBS | OXYGEN SATURATION: 99 % | HEART RATE: 88 BPM | RESPIRATION RATE: 19 BRPM

## 2023-01-18 DIAGNOSIS — S30.1XXD ABDOMINAL WALL SEROMA, SUBSEQUENT ENCOUNTER: Primary | ICD-10-CM

## 2023-01-18 PROCEDURE — 99213 OFFICE O/P EST LOW 20 MIN: CPT | Performed by: FAMILY MEDICINE

## 2023-01-18 ASSESSMENT — PAIN SCALES - GENERAL: PAINLEVEL: NO PAIN (0)

## 2023-01-18 NOTE — PROGRESS NOTES
"  Assessment & Plan     Abdominal wall seroma, subsequent encounter  Follow-up with general surgery for complication of tummy tuck  - Adult General Surg Referral; Future             BMI:   Estimated body mass index is 31.93 kg/m  as calculated from the following:    Height as of this encounter: 1.727 m (5' 8\").    Weight as of this encounter: 95.3 kg (210 lb).   Weight management plan: Discussed healthy diet and exercise guidelines        No follow-ups on file.    Keesha Zamarripa MD  [facility]   Liane is a 52 year old, presenting for the following health issues:  RECHECK (After tummy tuck)      History of Present Illness       Reason for visit:  Seroma    She eats 4 or more servings of fruits and vegetables daily.She consumes 1 sweetened beverage(s) daily.She exercises with enough effort to increase her heart rate 10 to 19 minutes per day.  She exercises with enough effort to increase her heart rate 5 days per week.   She is taking medications regularly.     Pt with history of tummy tuck in [place] in beginning of September  Was seen at the [facility] for seroma and it was drained  She feels like it has returned  No signs of infection  Will have her follow-up with gen surg    Review of Systems   Constitutional, HEENT, cardiovascular, pulmonary, gi and gu systems are negative, except as otherwise noted.      Objective    /53   Pulse 88   Temp 98.7  F (37.1  C) (Oral)   Resp 19   Ht 1.727 m (5' 8\")   Wt 95.3 kg (210 lb)   LMP 06/07/2018   SpO2 99%   BMI 31.93 kg/m    Body mass index is 31.93 kg/m .  Physical Exam   GENERAL: healthy, alert and no distress  ABDOMEN: tenderness just above surgical scar on lower abdomen. No palpable masses, or signs of infection and bowel sounds normal    No results found for this or any previous visit (from the past 24 hour(s)).                "

## 2023-02-13 ENCOUNTER — TELEPHONE (OUTPATIENT)
Dept: SURGERY | Facility: CLINIC | Age: 53
End: 2023-02-13

## 2023-02-13 NOTE — TELEPHONE ENCOUNTER
Patient had appointment scheduled with Dr. Presley on 2/13/23 at the [facility].    Dr Presley asked that the patient be informed that she should follow up with Dr. Engle at Plastic and Reconstructive Surgery at [facility] as she was seen there on 10/26 22 and informed to  Return to clinic in 4 weeks following US    Number given to Patient to schedule appointments for an  and Dr. Choco Contreras MA

## 2023-02-15 ENCOUNTER — ANCILLARY PROCEDURE (OUTPATIENT)
Dept: ULTRASOUND IMAGING | Facility: CLINIC | Age: 53
End: 2023-02-15
Attending: STUDENT IN AN ORGANIZED HEALTH CARE EDUCATION/TRAINING PROGRAM
Payer: COMMERCIAL

## 2023-02-15 DIAGNOSIS — S30.1XXD ABDOMINAL WALL SEROMA, SUBSEQUENT ENCOUNTER: ICD-10-CM

## 2023-02-15 PROCEDURE — 76705 ECHO EXAM OF ABDOMEN: CPT | Mod: TC | Performed by: RADIOLOGY

## 2023-02-17 NOTE — TELEPHONE ENCOUNTER
Pt is calling back wondering who she should follow up with.     Writer read message below to pt.    Pt wondering if Dr. Engle is worth following up with, she will contact the clinic during normal business hours.

## 2023-02-27 ENCOUNTER — TELEPHONE (OUTPATIENT)
Dept: PLASTIC SURGERY | Facility: CLINIC | Age: 53
End: 2023-02-27
Payer: COMMERCIAL

## 2023-02-27 NOTE — TELEPHONE ENCOUNTER
[facility]    Phone Message    May a detailed message be left on voicemail: yes     Reason for Call: Other: Patient was calling in because they would like /his nurses to look at their ultrasound results from [facility] and to let her know whether an appointment with  is needed. Please call patient back to discuss, thank you!     Action Taken: Message routed to:  [facility] ([facility] Surgery    Travel Screening: Not Applicable

## 2023-02-27 NOTE — TELEPHONE ENCOUNTER
Routing to Dr Wilhelm for review of US results. Pt states she does not feel need to come for a visit, unless there is a finding on the US requiring follow up.    If US is OK, she would rather not come in for a visit.

## 2023-03-21 ENCOUNTER — OFFICE VISIT (OUTPATIENT)
Dept: FAMILY MEDICINE | Facility: CLINIC | Age: 53
End: 2023-03-21
Payer: COMMERCIAL

## 2023-03-21 VITALS
TEMPERATURE: 98.8 F | HEIGHT: 68 IN | BODY MASS INDEX: 32.25 KG/M2 | OXYGEN SATURATION: 100 % | HEART RATE: 85 BPM | WEIGHT: 212.8 LBS | DIASTOLIC BLOOD PRESSURE: 88 MMHG | SYSTOLIC BLOOD PRESSURE: 164 MMHG

## 2023-03-21 DIAGNOSIS — F41.9 ANXIETY: ICD-10-CM

## 2023-03-21 DIAGNOSIS — F33.1 MODERATE EPISODE OF RECURRENT MAJOR DEPRESSIVE DISORDER (H): Primary | ICD-10-CM

## 2023-03-21 DIAGNOSIS — R03.0 ELEVATED BLOOD PRESSURE READING WITHOUT DIAGNOSIS OF HYPERTENSION: ICD-10-CM

## 2023-03-21 PROCEDURE — 99214 OFFICE O/P EST MOD 30 MIN: CPT | Performed by: FAMILY MEDICINE

## 2023-03-21 RX ORDER — FERROUS SULFATE 325(65) MG
325 TABLET ORAL
COMMUNITY
End: 2023-09-20

## 2023-03-21 ASSESSMENT — ANXIETY QUESTIONNAIRES
2. NOT BEING ABLE TO STOP OR CONTROL WORRYING: NEARLY EVERY DAY
3. WORRYING TOO MUCH ABOUT DIFFERENT THINGS: MORE THAN HALF THE DAYS
7. FEELING AFRAID AS IF SOMETHING AWFUL MIGHT HAPPEN: MORE THAN HALF THE DAYS
5. BEING SO RESTLESS THAT IT IS HARD TO SIT STILL: NOT AT ALL
6. BECOMING EASILY ANNOYED OR IRRITABLE: NEARLY EVERY DAY
GAD7 TOTAL SCORE: 16
1. FEELING NERVOUS, ANXIOUS, OR ON EDGE: NEARLY EVERY DAY
GAD7 TOTAL SCORE: 16

## 2023-03-21 ASSESSMENT — PAIN SCALES - GENERAL: PAINLEVEL: NO PAIN (0)

## 2023-03-21 ASSESSMENT — PATIENT HEALTH QUESTIONNAIRE - PHQ9: 5. POOR APPETITE OR OVEREATING: NEARLY EVERY DAY

## 2023-03-21 NOTE — LETTER
My Depression Action Plan  Name: Liane Key   Date of Birth 1970  Date: 3/21/2023    My doctor: No Ref-Primary, Physician   My clinic: [facility]  [address]  [place] [address]  [phone]          GREEN    ZONE   Good Control    What it looks like:     Things are going generally well. You have normal ups and downs. You may even feel depressed from time to time, but bad moods usually last less than a day.   What you need to do:  1. Continue to care for yourself (see self care plan)  2. Check your depression survival kit and update it as needed  3. Follow your physician s recommendations including any medication.  4. Do not stop taking medication unless you consult with your physician first.           YELLOW         ZONE Getting Worse    What it looks like:     Depression is starting to interfere with your life.     It may be hard to get out of bed; you may be starting to isolate yourself from others.    Symptoms of depression are starting to last most all day and this has happened for several days.     You may have suicidal thoughts but they are not constant.   What you need to do:     1. Call your care team. Your response to treatment will improve if you keep your care team informed of your progress. Yellow periods are signs an adjustment may need to be made.     2. Continue your self-care.  Just get dressed and ready for the day.  Don't give yourself time to talk yourself out of it.    3. Talk to someone in your support network.    4. Open up your Depression Self-Care Plan/Wellness Kit.           RED    ZONE Medical Alert - Get Help    What it looks like:     Depression is seriously interfering with your life.     You may experience these or other symptoms: You can t get out of bed most days, can t work or engage in other necessary activities, you have trouble taking care of basic hygiene, or basic responsibilities, thoughts of suicide or death that will  not go away, self-injurious behavior.     What you need to do:  1. Call your care team and request a same-day appointment. If they are not available (weekends or after hours) call your local crisis line, emergency room or 911.          Depression Self-Care Plan / Wellness Kit    Many people find that medication and therapy are helpful treatments for managing depression. In addition, making small changes to your everyday life can help to boost your mood and improve your wellbeing. Below are some tips for you to consider. Be sure to talk with your medical provider and/or behavioral health consultant if your symptoms are worsening or not improving.     Sleep   Sleep hygiene  means all of the habits that support good, restful sleep. It includes maintaining a consistent bedtime and wake time, using your bedroom only for sleeping or sex, and keeping the bedroom dark and free of distractions like a computer, smartphone, or television.     Develop a Healthy Routine  Maintain good hygiene. Get out of bed in the morning, make your bed, brush your teeth, take a shower, and get dressed. Don t spend too much time viewing media that makes you feel stressed. Find time to relax each day.    Exercise  Get some form of exercise every day. This will help reduce pain and release endorphins, the  feel good  chemicals in your brain. It can be as simple as just going for a walk or doing some gardening, anything that will get you moving.      Diet  Strive to eat healthy foods, including fruits and vegetables. Drink plenty of water. Avoid excessive sugar, caffeine, alcohol, and other mood-altering substances.     Stay Connected with Others  Stay in touch with friends and family members.    Manage Your Mood  Try deep breathing, massage therapy, biofeedback, or meditation. Take part in fun activities when you can. Try to find something to smile about each day.     Psychotherapy  Be open to working with a therapist if your provider recommends  it.     Medication  Be sure to take your medication as prescribed. Most anti-depressants need to be taken every day. It usually takes several weeks for medications to work. Not all medicines work for all people. It is important to follow-up with your provider to make sure you have a treatment plan that is working for you. Do not stop your medication abruptly without first discussing it with your provider.    Crisis Resources   These hotlines are for both adults and children. They and are open 24 hours a day, 7 days a week unless noted otherwise.      National Suicide Prevention Lifeline   988 or 3-469-029-YJGV (7202)      Crisis Text Line    www.crisistextline.org  Text HOME to 794126 from anywhere in the [place], anytime, about any type of crisis. A live, trained crisis counselor will receive the text and respond quickly.      [place] Lifeline for LGBTQ Youth  A national crisis intervention and suicide lifeline for LGBTQ youth under 25. Provides a safe place to talk without judgement. Call 1-441.374.7640; text START to 212358 or visit www.thetrevorproject.org to talk to a trained counselor.      For [facility] crisis numbers, visit the [facility] website at:  https://[place].gov/dhs/people-we-serve/adults/health-care/mental-health/resources/crisis-contacts.jsp

## 2023-03-21 NOTE — PROGRESS NOTES
Assessment & Plan     Moderate episode of recurrent major depressive disorder (H)  SEE [facility] care orders  The potential side effects of this medication have been discussed with the patient.  Call if any significant problems with these are experienced.  Discussed about taking some Time off   She declined  Says she wants to continue to work as she likes what she does Minus the stress and Feels overworked   - sertraline (ZOLOFT) 50 MG tablet; 25 mg daily x 1 week and then 1 tablet daily  - Adult Mental Health  Referral; Future    Anxiety  As above  If agitated or suicidal on this medicines -stop medicines   - sertraline (ZOLOFT) 50 MG tablet; 25 mg daily x 1 week and then 1 tablet daily  - Adult Mental Health  Referral; Future    Elevated blood pressure reading without diagnosis of hypertension  Pt is very anxious  She will follow up BP check 2-3 weeks    Return in about 1 month (around 4/21/2023) for Physical Exam, recheck/ sooner if worse or New symptoms, BP Recheck.    Nahomi Castañeda MD  [facility] [place] Rob is a 52 year old, presenting for the following health issues:  Stress      History of Present Illness       Reason for visit:  Stress (work related, has talked to HR, supervisiors and other coworkers)  Symptom onset:  More than a month (Past 5 year but worse in the last 6 months. )    She eats 2-3 servings of fruits and vegetables daily.She consumes 0 sweetened beverage(s) daily.She exercises with enough effort to increase her heart rate 30 to 60 minutes per day.  She exercises with enough effort to increase her heart rate 4 days per week.   She is taking medications regularly.       Stress at work  Had surgery in September  Works in a Day Program with Vulnerable adults  Has worked 10 years in this Job  But overwhelmed  Hard to work with a co worker as she does not do much  Pt has complained to HR for last 5 years and says Nothing happens  Has been working with  "a person that she has not got along with  Feels very stressed at work when this person is there    Onset/Duration: several months as above  Description: as above   Depression (if yes, do PHQ-9): YES-   Anxiety (if yes, do PATTY-7): YES-   Accompanying Signs & Symptoms:  Still participating in activities that you used to enjoy: No  Fatigue: YES  Irritability: YES  Difficulty concentrating: YES  Changes in appetite: YES  Problems with sleep: YES  Heart racing/beating fast: No  Abnormally elevated, expansive, or irritable mood: No  Persistently increased activity or energy: No  Thoughts of hurting yourself or others: No  History:  Recent stress or major life event: as above  Also takes care of her Granddaughter but feels due to her stress she is Not able to give a 100 %  Prior depression or anxiety: yes   Family history of depression or anxiety: No  Alcohol/drug use: No  Difficulty sleeping: yes  Precipitating or alleviating factors: None  Therapies tried and outcome: none  PHQ 4/21/2022 8/11/2022   PHQ-9 Total Score 16 1   Q9: Thoughts of better off dead/self-harm past 2 weeks Not at all Not at all     PATTY-7 SCORE 3/21/2023   Total Score 16       Review of Systems   Rest of the ROS is Negative except see above and Problem list [stable]        Objective    BP (!) 148/91   Pulse 85   Temp 98.8  F (37.1  C) (Temporal)   Ht 1.715 m (5' 7.52\")   Wt 96.5 kg (212 lb 12.8 oz)   LMP 06/07/2018   SpO2 100%   BMI 32.82 kg/m    Body mass index is 32.82 kg/m .  Physical Exam   GENERAL: alert and no distress  CVS RRR-normal   PSYCH: tearful, anxious, judgement and insight intact and appearance well groomed    none              "

## 2023-03-28 DIAGNOSIS — E89.0 HYPOTHYROIDISM FOLLOWING RADIOIODINE THERAPY: ICD-10-CM

## 2023-03-28 RX ORDER — LEVOTHYROXINE SODIUM 125 UG/1
125 TABLET ORAL DAILY
Qty: 90 TABLET | Refills: 0 | Status: SHIPPED | OUTPATIENT
Start: 2023-03-28 | End: 2023-06-06

## 2023-04-15 ENCOUNTER — HEALTH MAINTENANCE LETTER (OUTPATIENT)
Age: 53
End: 2023-04-15

## 2023-06-06 DIAGNOSIS — E89.0 HYPOTHYROIDISM FOLLOWING RADIOIODINE THERAPY: ICD-10-CM

## 2023-06-06 DIAGNOSIS — G47.00 INSOMNIA, UNSPECIFIED TYPE: ICD-10-CM

## 2023-06-06 RX ORDER — LEVOTHYROXINE SODIUM 125 UG/1
125 TABLET ORAL DAILY
Qty: 90 TABLET | Refills: 0 | Status: SHIPPED | OUTPATIENT
Start: 2023-06-06 | End: 2023-09-13

## 2023-06-06 RX ORDER — TRAZODONE HYDROCHLORIDE 50 MG/1
50 TABLET, FILM COATED ORAL AT BEDTIME
Qty: 90 TABLET | Refills: 1 | Status: SHIPPED | OUTPATIENT
Start: 2023-06-06 | End: 2024-03-26

## 2023-06-26 ENCOUNTER — OFFICE VISIT (OUTPATIENT)
Dept: FAMILY MEDICINE | Facility: CLINIC | Age: 53
End: 2023-06-26
Payer: COMMERCIAL

## 2023-06-26 ENCOUNTER — ANCILLARY PROCEDURE (OUTPATIENT)
Dept: GENERAL RADIOLOGY | Facility: CLINIC | Age: 53
End: 2023-06-26
Attending: NURSE PRACTITIONER
Payer: COMMERCIAL

## 2023-06-26 ENCOUNTER — TELEPHONE (OUTPATIENT)
Dept: FAMILY MEDICINE | Facility: CLINIC | Age: 53
End: 2023-06-26

## 2023-06-26 VITALS
BODY MASS INDEX: 32.25 KG/M2 | RESPIRATION RATE: 16 BRPM | SYSTOLIC BLOOD PRESSURE: 138 MMHG | DIASTOLIC BLOOD PRESSURE: 88 MMHG | TEMPERATURE: 97.9 F | OXYGEN SATURATION: 100 % | HEIGHT: 68 IN | HEART RATE: 63 BPM | WEIGHT: 212.8 LBS

## 2023-06-26 DIAGNOSIS — M54.42 ACUTE BILATERAL LOW BACK PAIN WITH BILATERAL SCIATICA: Primary | ICD-10-CM

## 2023-06-26 DIAGNOSIS — M54.41 ACUTE BILATERAL LOW BACK PAIN WITH BILATERAL SCIATICA: Primary | ICD-10-CM

## 2023-06-26 PROCEDURE — 99214 OFFICE O/P EST MOD 30 MIN: CPT | Performed by: NURSE PRACTITIONER

## 2023-06-26 PROCEDURE — 72100 X-RAY EXAM L-S SPINE 2/3 VWS: CPT | Mod: TC | Performed by: RADIOLOGY

## 2023-06-26 RX ORDER — IBUPROFEN 800 MG/1
800 TABLET, FILM COATED ORAL EVERY 8 HOURS PRN
Qty: 42 TABLET | Refills: 0 | Status: SHIPPED | OUTPATIENT
Start: 2023-06-26 | End: 2023-09-20

## 2023-06-26 RX ORDER — TIZANIDINE 2 MG/1
2 TABLET ORAL 3 TIMES DAILY
Qty: 20 TABLET | Refills: 0 | Status: SHIPPED | OUTPATIENT
Start: 2023-06-26

## 2023-06-26 ASSESSMENT — PATIENT HEALTH QUESTIONNAIRE - PHQ9
SUM OF ALL RESPONSES TO PHQ QUESTIONS 1-9: 5
SUM OF ALL RESPONSES TO PHQ QUESTIONS 1-9: 5
10. IF YOU CHECKED OFF ANY PROBLEMS, HOW DIFFICULT HAVE THESE PROBLEMS MADE IT FOR YOU TO DO YOUR WORK, TAKE CARE OF THINGS AT HOME, OR GET ALONG WITH OTHER PEOPLE: SOMEWHAT DIFFICULT

## 2023-06-26 ASSESSMENT — PAIN SCALES - GENERAL: PAINLEVEL: EXTREME PAIN (8)

## 2023-06-26 ASSESSMENT — ENCOUNTER SYMPTOMS: BACK PAIN: 1

## 2023-06-26 NOTE — PROGRESS NOTES
Phone number verified; patient gave verbal permission to leave a detailed message on voicemail.     Assessment & Plan     Acute bilateral low back pain with bilateral sciatica  No acute fracture or bulging disc noted upon initial provider review. Will notify patient when official read from radiology comes through. Symptoms consistent with muscle strain, but given pain without relief from ibuprofen and her 's muscle relaxer, will go ahead and order x-ray and call her with results. Ibuprofen 800mg prescribed and educated on risks of taking more than prescribed. Tizanidine prescribed. Advised no driving after taking it. Side effects, risks and benefits of medication were discussed with patient. Discussed how and when to take medication. PT referral placed.     - XR Lumbar Spine 2/3 Views  - tiZANidine (ZANAFLEX) 2 MG tablet; Take 1 tablet (2 mg) by mouth 3 times daily  - ibuprofen (ADVIL/MOTRIN) 800 MG tablet; Take 1 tablet (800 mg) by mouth every 8 hours as needed for moderate pain  - Physical Therapy Referral; Future    Ordering of each unique test  Prescription drug management         Patient Instructions   Lower back pain:  1. X-ray done today. We will notify you when radiology does the official read.  2. This is likely a muscle pull.  3. Take ibuprofen 800mg every 8 hours for 1-2 weeks. Take with food.  4. Tizanidine 3 times a day for muscle tightness. Do not drive after taking this.   5. If you develop any upset stomach or GI symptoms, please contact us.  6. You can also take Tylenol 1000mg every 8 hours (max 3000mg/day). Recommend taking 4 hours after Ibuprofen.  7. Rest, ice/heat, rest.  8. Follow-up in 1-2 weeks if symptoms do not improve or sooner if symptoms worsen.    You are due for your annual preventative visit. Schedule with your PCP at your earliest convenience.       Kelly Buckner DNP  [facility]    John Rob is a 53 year old, presenting for the following  "health issues:  Back Pain    Back Pain      Answers for HPI/ROS submitted by the patient on 6/26/2023  If you checked off any problems, how difficult have these problems made it for you to do your work, take care of things at home, or get along with other people?: Somewhat difficult  PHQ9 TOTAL SCORE: 5  Your back pain is: chronic  Where is your back pain located? : right lower back, left lower back  How would you describe your back pain? : sharp, shooting, stabbing  Where does your back pain spread? : right buttocks, left buttocks  Since you noticed your back pain, how has it changed? : gradually worsening  Does your back pain interfere with your job?: Yes      Additional provider notes: Patient presents in clinic with bilat lower back pain (R>L) that radiates into bilat buttock. States pain is gradually getting worse. This started with picking up purse 4 days ago. States she didn't do heavy lifting or twisting that she is aware of. Denies any injury. She has been using a cane due to her pain. She has also been taking 1600mg of her 's prescription ibuprofen and a muscle relaxer he had (unknown name). She has tried heat pad with some relief. She missed work last Friday, but would like to go to work today after appt. She is a  (sitting and standing at times). No lifting. They are willing to accommodate her as needed. States she hasn't rested much this weekend and has been doing \"a lot\".     Same thing happened last year when she was moving a mattress. States time helped and it took about a month to improve. She was given PT exercises and has continued to do those.     No Known Allergies    Current Outpatient Medications   Medication     ferrous sulfate (FEROSUL) 325 (65 Fe) MG tablet     hydrOXYzine (ATARAX) 25 MG tablet     levothyroxine (SYNTHROID/LEVOTHROID) 125 MCG tablet     Multiple Vitamins-Iron (ONE-TABLET-DAILY/IRON PO)     traZODone (DESYREL) 50 MG tablet     ACETAMINOPHEN EXTRA " "STRENGTH 500 MG tablet     sertraline (ZOLOFT) 50 MG tablet     No current facility-administered medications for this visit.       Past Medical History:   Diagnosis Date     Graves disease      Hypothyroidism following radioiodine therapy      Obesity      Syphilis 5/8/2015            Review of Systems   Musculoskeletal: Positive for back pain.            Objective    /88   Pulse 63   Temp 97.9  F (36.6  C) (Oral)   Resp 16   Ht 1.715 m (5' 7.52\")   Wt 96.5 kg (212 lb 12.8 oz)   LMP 06/07/2018   SpO2 100%   BMI 32.82 kg/m    Body mass index is 32.82 kg/m .  Physical Exam  Vitals reviewed.   Constitutional:       General: She is not in acute distress.     Appearance: Normal appearance. She is obese. She is not ill-appearing or toxic-appearing.   Musculoskeletal:      Lumbar back: Spasms, tenderness and bony tenderness (ML lower back pain) present. No swelling.   Skin:     General: Skin is warm and dry.   Neurological:      Mental Status: She is alert and oriented to person, place, and time.   Psychiatric:         Behavior: Behavior normal.            Xray - Reviewed and interpreted by me.  Lumbar - no signs of bulging disc or fractures. Pending radiology read.          "

## 2023-06-26 NOTE — PATIENT INSTRUCTIONS
Lower back pain:  X-ray done today. We will notify you when radiology does the official read.  This is likely a muscle pull.  Take ibuprofen 800mg every 8 hours for 1-2 weeks. Take with food.  Tizanidine 3 times a day for muscle tightness. Do not drive after taking this.   If you develop any upset stomach or GI symptoms, please contact us.  You can also take Tylenol 1000mg every 8 hours (max 3000mg/day). Recommend taking 4 hours after Ibuprofen.  Rest, ice/heat, rest.  Follow-up in 1-2 weeks if symptoms do not improve or sooner if symptoms worsen.    You are due for your annual preventative visit. Schedule with your PCP at your earliest convenience.

## 2023-06-26 NOTE — TELEPHONE ENCOUNTER
Patient gave verbal permission in clinic today to leave a detailed VM if she didn't answer. Detailed message left on VM regarding negative x-ray and recommendations to continue with what we discussed in clinic. Follow-up as needed.     Kelly Buckner DNP, NP-C

## 2023-08-08 ENCOUNTER — VIRTUAL VISIT (OUTPATIENT)
Dept: URGENT CARE | Facility: CLINIC | Age: 53
End: 2023-08-08
Payer: COMMERCIAL

## 2023-08-08 ENCOUNTER — TELEPHONE (OUTPATIENT)
Dept: URGENT CARE | Facility: URGENT CARE | Age: 53
End: 2023-08-08

## 2023-08-08 DIAGNOSIS — Z53.9 NO SHOW: Primary | ICD-10-CM

## 2023-08-08 NOTE — TELEPHONE ENCOUNTER
"I relay this message from Cammy Garrett \"Im sorry the time has passed and we are full for tonight, she will need to do a visit tomorrow  If video isn't working she can schedule telephone\"   Cammy Garrett, APRN CNP     The patient wasn't too happy with the plan., I try given her other option like making a tbd or a onw at any other urgent if she can't or want to make another virtual visit or telephone visit, but all she states is that she can't drive anywhere or go anywhere due to the wait time plus she has her grand baby wit her. She was very agitated and angry that she was not being polite with me, she wasn't happy how this was being taking care of ir the respond that I gave her and the provider gave her.  She kept telling me to stop talking and then she hang up.   "

## 2023-08-08 NOTE — TELEPHONE ENCOUNTER
General Call    Contacts         Type Contact Phone/Fax    08/08/2023 05:42 PM CDT Phone (Incoming) Liane eKy (Self) [phone] (M)          Reason for Call:  Urgent Care visist 8/8/23 - 5:30PM    What are your questions or concerns:  patient is unable to get the video visit working for her 5:30PM appt with TINY NAVARRO; please call patient at [phone] for visit. Thank you!    Date of last appointment with provider: .    Could we send this information to you in [employer] or would you prefer to receive a phone call?:   Patient would prefer a phone call   Okay to leave a detailed message?: Yes at Cell number on file:    Telephone Information:   Mobile [phone]

## 2023-09-13 DIAGNOSIS — E89.0 HYPOTHYROIDISM FOLLOWING RADIOIODINE THERAPY: ICD-10-CM

## 2023-09-14 RX ORDER — LEVOTHYROXINE SODIUM 125 UG/1
125 TABLET ORAL DAILY
Qty: 90 TABLET | Refills: 0 | Status: SHIPPED | OUTPATIENT
Start: 2023-09-14 | End: 2023-09-21 | Stop reason: DRUGHIGH

## 2023-09-14 NOTE — TELEPHONE ENCOUNTER
90 day refill sent to the pharmacy.     Please help her schedule a follow-up and annual physical. She hasn't been seen in over a year for her thyroid and needs lab work done.     Thanks,  Kelly Buckner DNP, NP-C

## 2023-09-16 ENCOUNTER — HEALTH MAINTENANCE LETTER (OUTPATIENT)
Age: 53
End: 2023-09-16

## 2023-09-20 ENCOUNTER — OFFICE VISIT (OUTPATIENT)
Dept: FAMILY MEDICINE | Facility: CLINIC | Age: 53
End: 2023-09-20
Payer: COMMERCIAL

## 2023-09-20 ENCOUNTER — TELEPHONE (OUTPATIENT)
Dept: FAMILY MEDICINE | Facility: CLINIC | Age: 53
End: 2023-09-20

## 2023-09-20 VITALS
HEIGHT: 68 IN | SYSTOLIC BLOOD PRESSURE: 96 MMHG | BODY MASS INDEX: 29.7 KG/M2 | WEIGHT: 196 LBS | HEART RATE: 89 BPM | DIASTOLIC BLOOD PRESSURE: 64 MMHG | TEMPERATURE: 98.5 F | OXYGEN SATURATION: 99 %

## 2023-09-20 DIAGNOSIS — R63.0 POOR APPETITE: Primary | ICD-10-CM

## 2023-09-20 DIAGNOSIS — E89.0 HYPOTHYROIDISM FOLLOWING RADIOIODINE THERAPY: Chronic | ICD-10-CM

## 2023-09-20 DIAGNOSIS — F51.04 CHRONIC INSOMNIA: ICD-10-CM

## 2023-09-20 PROBLEM — F33.1 MODERATE EPISODE OF RECURRENT MAJOR DEPRESSIVE DISORDER (H): Status: RESOLVED | Noted: 2023-03-21 | Resolved: 2023-09-20

## 2023-09-20 LAB
ALBUMIN SERPL BCG-MCNC: 4.3 G/DL (ref 3.5–5.2)
ALP SERPL-CCNC: 46 U/L (ref 35–104)
ALT SERPL W P-5'-P-CCNC: 10 U/L (ref 0–50)
ANION GAP SERPL CALCULATED.3IONS-SCNC: 12 MMOL/L (ref 7–15)
AST SERPL W P-5'-P-CCNC: 19 U/L (ref 0–45)
BILIRUB SERPL-MCNC: 1 MG/DL
BUN SERPL-MCNC: 13.2 MG/DL (ref 6–20)
CALCIUM SERPL-MCNC: 9.4 MG/DL (ref 8.6–10)
CHLORIDE SERPL-SCNC: 101 MMOL/L (ref 98–107)
CREAT SERPL-MCNC: 0.8 MG/DL (ref 0.51–0.95)
DEPRECATED HCO3 PLAS-SCNC: 23 MMOL/L (ref 22–29)
EGFRCR SERPLBLD CKD-EPI 2021: 88 ML/MIN/1.73M2
ERYTHROCYTE [DISTWIDTH] IN BLOOD BY AUTOMATED COUNT: 11.5 % (ref 10–15)
GLUCOSE SERPL-MCNC: 108 MG/DL (ref 70–99)
HCT VFR BLD AUTO: 37.4 % (ref 35–47)
HGB BLD-MCNC: 12.1 G/DL (ref 11.7–15.7)
MCH RBC QN AUTO: 29.4 PG (ref 26.5–33)
MCHC RBC AUTO-ENTMCNC: 32.4 G/DL (ref 31.5–36.5)
MCV RBC AUTO: 91 FL (ref 78–100)
PLATELET # BLD AUTO: 308 10E3/UL (ref 150–450)
POTASSIUM SERPL-SCNC: 3.7 MMOL/L (ref 3.4–5.3)
PROT SERPL-MCNC: 7.5 G/DL (ref 6.4–8.3)
RBC # BLD AUTO: 4.11 10E6/UL (ref 3.8–5.2)
SODIUM SERPL-SCNC: 136 MMOL/L (ref 136–145)
T4 FREE SERPL-MCNC: 1.66 NG/DL (ref 0.9–1.7)
TSH SERPL DL<=0.005 MIU/L-ACNC: 0.14 UIU/ML (ref 0.3–4.2)
WBC # BLD AUTO: 8.2 10E3/UL (ref 4–11)

## 2023-09-20 PROCEDURE — 99214 OFFICE O/P EST MOD 30 MIN: CPT | Performed by: FAMILY MEDICINE

## 2023-09-20 PROCEDURE — 85027 COMPLETE CBC AUTOMATED: CPT | Performed by: FAMILY MEDICINE

## 2023-09-20 PROCEDURE — 84443 ASSAY THYROID STIM HORMONE: CPT | Performed by: FAMILY MEDICINE

## 2023-09-20 PROCEDURE — 80053 COMPREHEN METABOLIC PANEL: CPT | Performed by: FAMILY MEDICINE

## 2023-09-20 PROCEDURE — 36415 COLL VENOUS BLD VENIPUNCTURE: CPT | Performed by: FAMILY MEDICINE

## 2023-09-20 PROCEDURE — 84439 ASSAY OF FREE THYROXINE: CPT | Performed by: FAMILY MEDICINE

## 2023-09-20 ASSESSMENT — ENCOUNTER SYMPTOMS: NAUSEA: 1

## 2023-09-20 ASSESSMENT — PAIN SCALES - GENERAL: PAINLEVEL: NO PAIN (0)

## 2023-09-20 NOTE — PROGRESS NOTES
Assessment & Plan       ICD-10-CM    1. Poor appetite  R63.0 Comprehensive metabolic panel     CBC with platelets     TSH with free T4 reflex      2. Hypothyroidism following radioiodine therapy  E89.0       3. Chronic insomnia  F51.04         She has had a poor appetite with early satiety and has had some weight loss in the last couple of months  We will check lab work today as above for further evaluation  Discussed possible role for upper endoscopy or abdominal imaging if the labs are unremarkable  She is due for follow-up with her PCP for medication management and other routine health care, so I advised her to make an appointment to see her PCP in the next couple of weeks or so for ongoing care and she was agreeable to that        Dwight Degroot MD  [facility] JAVIER Rob is a 53 year old, presenting for the following health issues:  Anorexia and Nausea (Loss of appetite 2 months)      2023     3:28 PM   Additional Questions   Roomed by cam   Accompanied by none         2023     3:28 PM   Patient Reported Additional Medications   Patient reports taking the following new medications noone       Nausea  Associated symptoms include nausea.   History of Present Illness       Reason for visit:  Can't eat    She eats 0-1 servings of fruits and vegetables daily.She consumes 0 sweetened beverage(s) daily.She exercises with enough effort to increase her heart rate 10 to 19 minutes per day.  She exercises with enough effort to increase her heart rate 4 days per week.   She is taking medications regularly.     The patient comes in because of a poor appetite in the last couple of months.  She gets full quite readily.  She has had some nausea.  Minimal vomiting.  Her dad  unexpectedly this last , but she has not been stressed out or affected by his health in recent months.  I note that she was seen earlier this year in March for depression and was prescribed sertraline,  "but apparently did not take that.  She states that she was having some work issues at that time, but she does not currently feel especially anxious or depressed.  She does have a history of hypothyroidism and is on thyroid replacement medication.  She takes trazodone to help with sleep.  She is on no other routine prescription medications.  She takes a variety of vitamins.  Her PCP is listed as Kelly Buckner out of our [place] clinic.  It looks like she is due for thyroid testing and for a ride of other preventative health care.    Patient Active Problem List   Diagnosis    Hypothyroidism following radioiodine therapy    Class 1 obesity due to excess calories without serious comorbidity with body mass index (BMI) of 31.0 to 31.9 in adult    Elevated blood pressure reading without diagnosis of hypertension    History of Graves' disease    Chronic insomnia    Anxiety     Current Outpatient Medications   Medication    levothyroxine (SYNTHROID/LEVOTHROID) 125 MCG tablet    Multiple Vitamins-Iron (ONE-TABLET-DAILY/IRON PO)    traZODone (DESYREL) 50 MG tablet    ACETAMINOPHEN EXTRA STRENGTH 500 MG tablet    tiZANidine (ZANAFLEX) 2 MG tablet     No current facility-administered medications for this visit.           Review of Systems   Gastrointestinal:  Positive for nausea.            Objective    BP 96/64 (BP Location: Left arm, Patient Position: Sitting, Cuff Size: Adult Large)   Pulse 89   Temp 98.5  F (36.9  C) (Oral)   Ht 1.715 m (5' 7.5\")   Wt 88.9 kg (196 lb)   LMP 06/07/2018   SpO2 99%   BMI 30.24 kg/m    Body mass index is 30.24 kg/m .  Physical Exam     GENERAL: alert, no distress, and over weight    ABDOMEN: soft, nontender, no hepatosplenomegaly, no masses and bowel sounds normal  PSYCH: Affect seems pleasant and appropriate    Previous chart notes were reviewed              "

## 2023-09-20 NOTE — TELEPHONE ENCOUNTER
Patient Quality Outreach    Patient is due for the following:   Breast Cancer Screening - Mammogram  Physical Preventive Adult Physical    Next Steps:   See below    Type of outreach:    Phone, spoke to patient/parent. Discussed HM and patient will schedule her PX and mammogram with her PCP .    Next Steps:  Reach out within 90 days via  done .    Max number of attempts reached: Yes. Will try again in 90 days if patient still on fail list.    Questions for provider review:    None           Abigail Humphrey, [facility]  Chart routed to closing encounter.

## 2023-09-21 RX ORDER — LEVOTHYROXINE SODIUM 112 UG/1
112 TABLET ORAL DAILY
Qty: 90 TABLET | Refills: 0 | Status: SHIPPED | OUTPATIENT
Start: 2023-09-21 | End: 2023-12-14

## 2023-09-21 NOTE — RESULT ENCOUNTER NOTE
Liane,  Your lab results generally look good, although your thyroid tests indicate that you are taking a higher dose of thyroid replacement hormone than necessary and desirable.  I therefore sent a new prescription for levothyroxine at 112 mcg daily to your pharmacy.  Please discontinue your 125 mcg dose and start the new prescription for 112 mcg daily.  Taking too much thyroid hormone could be contributing to your weight loss and appetite issues.  Please follow-up on this topic on October 10 when you have your visit with your primary care provider, Kelly Buckner.    Dwight Degroot MD

## 2023-12-14 DIAGNOSIS — E89.0 HYPOTHYROIDISM FOLLOWING RADIOIODINE THERAPY: Chronic | ICD-10-CM

## 2023-12-14 RX ORDER — LEVOTHYROXINE SODIUM 112 UG/1
112 TABLET ORAL DAILY
Qty: 30 TABLET | Refills: 0 | Status: SHIPPED | OUTPATIENT
Start: 2023-12-14 | End: 2024-01-30

## 2023-12-15 NOTE — TELEPHONE ENCOUNTER
Called patient and left a detailed voicemail message and relayed Kelly Buckner message below.    ENMA Alcala  [facility]

## 2023-12-15 NOTE — TELEPHONE ENCOUNTER
Last TSH was low and medication was adjusted. I sent a refill for 30 days, but patient needs to come in for lab draw to make sure it is normal before further refills can be sent.     Thanks,  Kelly Buckner DNP, NP-C

## 2023-12-19 NOTE — TELEPHONE ENCOUNTER
Called patient and scheduled a lab only appointment.    ENMA Alcala  [facility]     normal...

## 2023-12-20 ENCOUNTER — LAB (OUTPATIENT)
Dept: LAB | Facility: CLINIC | Age: 53
End: 2023-12-20
Payer: COMMERCIAL

## 2023-12-20 DIAGNOSIS — E89.0 HYPOTHYROIDISM FOLLOWING RADIOIODINE THERAPY: Chronic | ICD-10-CM

## 2023-12-20 LAB — TSH SERPL DL<=0.005 MIU/L-ACNC: 0.73 UIU/ML (ref 0.3–4.2)

## 2023-12-20 PROCEDURE — 36415 COLL VENOUS BLD VENIPUNCTURE: CPT

## 2023-12-20 PROCEDURE — 84443 ASSAY THYROID STIM HORMONE: CPT

## 2024-01-08 ENCOUNTER — OFFICE VISIT (OUTPATIENT)
Dept: INTERNAL MEDICINE | Facility: CLINIC | Age: 54
End: 2024-01-08
Payer: COMMERCIAL

## 2024-01-08 VITALS
DIASTOLIC BLOOD PRESSURE: 85 MMHG | TEMPERATURE: 98.8 F | WEIGHT: 185.8 LBS | HEART RATE: 77 BPM | OXYGEN SATURATION: 99 % | RESPIRATION RATE: 16 BRPM | BODY MASS INDEX: 28.67 KG/M2 | SYSTOLIC BLOOD PRESSURE: 120 MMHG

## 2024-01-08 DIAGNOSIS — Z78.0 MENOPAUSE: ICD-10-CM

## 2024-01-08 DIAGNOSIS — H10.33 ACUTE BACTERIAL CONJUNCTIVITIS OF BOTH EYES: Primary | ICD-10-CM

## 2024-01-08 DIAGNOSIS — Z12.31 VISIT FOR SCREENING MAMMOGRAM: ICD-10-CM

## 2024-01-08 PROCEDURE — 99213 OFFICE O/P EST LOW 20 MIN: CPT

## 2024-01-08 RX ORDER — POLYMYXIN B SULFATE AND TRIMETHOPRIM 1; 10000 MG/ML; [USP'U]/ML
SOLUTION OPHTHALMIC
Qty: 10 ML | Refills: 0 | Status: SHIPPED | OUTPATIENT
Start: 2024-01-08 | End: 2024-01-15

## 2024-01-08 ASSESSMENT — ENCOUNTER SYMPTOMS
PHOTOPHOBIA: 1
EYE PAIN: 1
COUGH: 0
EYE DISCHARGE: 1
EYE REDNESS: 1

## 2024-01-08 ASSESSMENT — PATIENT HEALTH QUESTIONNAIRE - PHQ9
SUM OF ALL RESPONSES TO PHQ QUESTIONS 1-9: 13
SUM OF ALL RESPONSES TO PHQ QUESTIONS 1-9: 13
10. IF YOU CHECKED OFF ANY PROBLEMS, HOW DIFFICULT HAVE THESE PROBLEMS MADE IT FOR YOU TO DO YOUR WORK, TAKE CARE OF THINGS AT HOME, OR GET ALONG WITH OTHER PEOPLE: SOMEWHAT DIFFICULT

## 2024-01-08 NOTE — PROGRESS NOTES
(H10.33) Acute bacterial conjunctivitis of both eyes  (primary encounter diagnosis)  Comment: Patient has bilateral redness noted to be worse In the left eye. Discussed antibiotic eye drops to start today.  Plan: REVIEW OF HEALTH MAINTENANCE PROTOCOL ORDERS,        Start  polymixin b-trimethoprim (POLYTRIM) 91992-0.1         UNIT/ML-% ophthalmic solution        Prescription sent to pharmacy    (Z12.31) Visit for screening mammogram  Comment: Discussed need for mammogram  Plan: MA SCREENING DIGITAL BILAT - Future  (s+30)        Future    (Z78.0) Menopause  Comment: Patient is having issues with hot flashes, then is cold. Has no appetite and is concerned over weight loss. Looked over last labs all have come back heydi. Discussed Ensure clear to help with nutritions supplementation. Discussed referral to gynecology to help better assess menopausal symptom and management. Also discussed over the counter remedies for hot flashes including black cohosh. Discussed using Effexor to help manage hot flashes would like to try over the counter remedy first.   Plan: Ob/Gyn  Referral        Future  Will look for over the counter black cohosh.         John Rob is a 53 year old, presenting for the following health issues:  Menopausal Sx (No appetite, hot flashes, can't sleep ), Conjunctivitis (Lt eye, started out in rt eye, spreading to left eye recently ), and Conjunctivitis      History of Present Illness       Reason for visit:  Pink eye     menapause  Symptom onset:  1-2 weeks ago  Symptoms include:  Red eye    hot flashes  Symptom intensity:  Severe  Symptom progression:  Staying the same  Had these symptoms before:  No  What makes it worse:  No  What makes it better:  No    She eats 2-3 servings of fruits and vegetables daily.She consumes 0 sweetened beverage(s) daily.She exercises with enough effort to increase her heart rate 10 to 19 minutes per day.  She exercises with enough effort to increase her  heart rate 4 days per week.   She is taking medications regularly.                 Review of Systems   Eyes:  Positive for photophobia, pain, discharge and redness.   Respiratory:  Negative for cough.             Objective    /85   Pulse 77   Temp 98.8  F (37.1  C) (Temporal)   Resp 16   Wt 84.3 kg (185 lb 12.8 oz)   LMP 06/07/2018   SpO2 99%   BMI 28.67 kg/m    Body mass index is 28.67 kg/m .  Physical Exam  Constitutional:       Appearance: Normal appearance.   HENT:      Nose: Nose normal.   Eyes:      General:         Right eye: Discharge present.         Left eye: Discharge present.     Comments: Redness noted in both eyes left is worse    Cardiovascular:      Rate and Rhythm: Normal rate and regular rhythm.      Pulses: Normal pulses.      Heart sounds: Normal heart sounds. No murmur heard.  Pulmonary:      Effort: Pulmonary effort is normal. No respiratory distress.      Breath sounds: Normal breath sounds.   Abdominal:      General: Bowel sounds are normal. There is no distension.      Tenderness: There is no abdominal tenderness.   Musculoskeletal:         General: No swelling, tenderness or deformity. Normal range of motion.   Skin:     General: Skin is warm.      Coloration: Skin is not jaundiced.      Findings: No bruising.   Neurological:      General: No focal deficit present.      Mental Status: She is alert and oriented to person, place, and time.   Psychiatric:         Mood and Affect: Mood normal.         Behavior: Behavior normal.         Thought Content: Thought content normal.         Judgment: Judgment normal.

## 2024-01-08 NOTE — PATIENT INSTRUCTIONS
Pinkeye: Care Instructions  Overview     Pinkeye is redness and swelling of the eye surface and the conjunctiva (the lining of the eyelid and the covering of the white part of the eye). Pinkeye is also called conjunctivitis. Pinkeye is often caused by infection with bacteria or a virus. Dry air, allergies, smoke, and chemicals are other common causes.  Pinkeye often gets better on its own in 7 to 10 days. Antibiotics only help if the pinkeye is caused by bacteria. Pinkeye caused by infection spreads easily. If an allergy or chemical is causing pinkeye, it will not go away unless you can avoid whatever is causing it.  Follow-up care is a key part of your treatment and safety. Be sure to make and go to all appointments, and call your doctor if you are having problems. It's also a good idea to know your test results and keep a list of the medicines you take.  How can you care for yourself at home?  Wash your hands often. Always wash them before and after you treat pinkeye or touch your eyes or face.  Use moist cotton or a clean, wet cloth to remove crust. Wipe from the inside corner of the eye to the outside. Use a clean part of the cloth for each wipe.  Put cold or warm wet cloths on your eye a few times a day if the eye hurts.  Do not wear contact lenses or eye makeup until the pinkeye is gone. Throw away any eye makeup you were using when you got pinkeye. Clean your contacts and storage case. If you wear disposable contacts, use a new pair when your eye has cleared and it is safe to wear contacts again.  If the doctor gave you antibiotic ointment or eyedrops, use them as directed. Use the medicine for as long as instructed, even if your eye starts looking better soon. Keep the bottle tip clean, and do not let it touch the eye area.  To put in eyedrops or ointment:  Tilt your head back, and pull your lower eyelid down with one finger.  Drop or squirt the medicine inside the lower lid.  Close your eye for 30 to 60  "seconds to let the drops or ointment move around.  Do not touch the ointment or dropper tip to your eyelashes or any other surface.  Do not share towels, pillows, or washcloths while you have pinkeye.  When should you call for help?   Call your doctor now or seek immediate medical care if:    You have pain in your eye, not just irritation on the surface.     You have a change in vision or loss of vision.     You have an increase in discharge from the eye.     Your eye has not started to improve or begins to get worse within 48 hours after you start using antibiotics.     Pinkeye lasts longer than 7 days.   Watch closely for changes in your health, and be sure to contact your doctor if you have any problems.  Where can you learn more?  Go to https://www.[employer].net/patiented  Enter Y392 in the search box to learn more about \"Pinkeye: Care Instructions.\"  Current as of: June 6, 2023               Content Version: 13.8    4547-6185 [employer].   Care instructions adapted under license by your healthcare professional. If you have questions about a medical condition or this instruction, always ask your healthcare professional. [employer] disclaims any warranty or liability for your use of this information.      "

## 2024-01-08 NOTE — COMMUNITY RESOURCES LIST (ENGLISH)
01/08/2024   [facility]  N/A  For questions about this resource list or additional care needs, please contact your primary care clinic or care manager.  Phone: [phone]   Email: N/A   Address: [address] [place] [id number]   Hours: N/A        Food and Nutrition       Food pantry  1  [facility] - Food Shelf Distance: 0.49 miles      Pickup   [address] [place] [address]  Language: Albanian, English, Tamazight, Estonian, Polish  Hours: Sun 11:00 AM - 1:30 PM  Fees: Free   Phone: (248) 876-4242 Email: [email or web address] Website: [email or web address]     2  [facility] Distance: 0.55 miles      In-Person   [address] [place] [address]  Language: English  Hours: Tue 10:00 AM - 11:30 AM , Wed 5:00 PM - 6:30 PM , Fri 10:00 AM - 11:30 AM  Fees: Free   Phone: (949) 398-1770 Email: info@[facility].org Website: http://www.[facility].org/     SNAP application assistance  3  [facility]   Family Wellness ([employer]IF[employer]) Distance: 2.92 miles      In-Person, Phone/Virtual   [address] [place] [address]  Language: Albanian, Persian, English, Gujarati, Kemi, Setswana, Tamazight, Yoruba, Malay, Polish  Hours: Mon - Wed 9:00 AM - 5:00 PM , Thu 12:00 PM - 6:00 PM , Fri 9:00 AM - 5:00 PM , Sun 10:30 AM - 2:00 PM Appt. Only  Fees: Free   Phone: (431) 672-8055 Email: info@[employer].org Website: https://www.sewa-aifw.org/     4  [employer] [place]  [facility] Distance: 3.98 miles      In-Person, Phone/Virtual   [address] [place] [address]  Language: English, Hmong  Hours: Mon - Fri 8:30 AM - 4:30 PM  Fees: Free   Phone: (396) 586-3628 Ext.1 Email: info@[employer] Website: https://www.[employer].org/     Soup kitchen or free meals  5  [facility] [employer] Distance: 0.55 miles      In-Person   [address] [place] [address]  Language: English  Hours: Wed 5:15 PM  - 6:00 PM  Fees: Free   Phone: (613) 569-3320 Email: info@[facility].org Website: http://[email or web address]/     6  [facility] Dinner Distance: 0.82 miles      Pickup   [address] [place] [address]  Language: English  Hours: Tue 5:00 PM - 6:30 PM  Fees: Free   Phone: (322) 944-2958 Email: admin@[employer] Website: http://www.[employer]/          Important Numbers & Websites       Emergency Services   911  [facility]   311  Poison Control   (854) 694-4592  Suicide Prevention Lifeline   (196) 388-6406 (TALK)  Child Abuse Hotline   (580) 765-1060 (4-A-Child)  Sexual Assault Hotline   (573) 155-1327 (HOPE)  National Runaway Safeline   (162) 675-5004 (RUNAWAY)  All-Options Talkline   (123) 779-4533  Substance Abuse Referral   (479) 987-4962 (HELP)

## 2024-01-30 DIAGNOSIS — E89.0 HYPOTHYROIDISM FOLLOWING RADIOIODINE THERAPY: Chronic | ICD-10-CM

## 2024-01-30 RX ORDER — LEVOTHYROXINE SODIUM 112 UG/1
112 TABLET ORAL DAILY
Qty: 90 TABLET | Refills: 0 | Status: SHIPPED | OUTPATIENT
Start: 2024-01-30 | End: 2024-05-06

## 2024-03-20 ENCOUNTER — OFFICE VISIT (OUTPATIENT)
Dept: INTERNAL MEDICINE | Facility: CLINIC | Age: 54
End: 2024-03-20
Payer: COMMERCIAL

## 2024-03-20 VITALS
TEMPERATURE: 97.6 F | BODY MASS INDEX: 29.01 KG/M2 | SYSTOLIC BLOOD PRESSURE: 138 MMHG | WEIGHT: 191.4 LBS | HEIGHT: 68 IN | HEART RATE: 66 BPM | OXYGEN SATURATION: 100 % | RESPIRATION RATE: 16 BRPM | DIASTOLIC BLOOD PRESSURE: 89 MMHG

## 2024-03-20 DIAGNOSIS — K64.4 EXTERNAL HEMORRHOIDS: Primary | ICD-10-CM

## 2024-03-20 DIAGNOSIS — E89.0 HYPOTHYROIDISM FOLLOWING RADIOIODINE THERAPY: Chronic | ICD-10-CM

## 2024-03-20 PROCEDURE — 99213 OFFICE O/P EST LOW 20 MIN: CPT

## 2024-03-20 RX ORDER — HYDROCORTISONE 25 MG/G
CREAM TOPICAL 2 TIMES DAILY PRN
Qty: 28 G | Refills: 2 | Status: SHIPPED | OUTPATIENT
Start: 2024-03-20

## 2024-03-20 ASSESSMENT — PATIENT HEALTH QUESTIONNAIRE - PHQ9
SUM OF ALL RESPONSES TO PHQ QUESTIONS 1-9: 10
10. IF YOU CHECKED OFF ANY PROBLEMS, HOW DIFFICULT HAVE THESE PROBLEMS MADE IT FOR YOU TO DO YOUR WORK, TAKE CARE OF THINGS AT HOME, OR GET ALONG WITH OTHER PEOPLE: SOMEWHAT DIFFICULT
SUM OF ALL RESPONSES TO PHQ QUESTIONS 1-9: 10

## 2024-03-20 NOTE — PROGRESS NOTES
"  Assessment & Plan     (K64.4) External hemorrhoids  (primary encounter diagnosis)  Comment: Patient seen in clinic today for external hemorrhoids discussed treatment options with patient prescribed hydrocortisone 2.5%  Plan: Start hydrocortisone, Perianal, (HYDROCORTISONE) 2.5 % cream        Prescription sent to pharmacy     (E89.0) Hypothyroidism following radioiodine therapy  Comment: Chronic, stable last TSH 0.73    Review of the result(s) of each unique test - TSH        BMI  Estimated body mass index is 29.54 kg/m  as calculated from the following:    Height as of this encounter: 1.715 m (5' 7.5\").    Weight as of this encounter: 86.8 kg (191 lb 6.4 oz).             John Rob is a 53 year old, presenting for the following health issues:  Hemmeroid        3/20/2024     7:07 AM   Additional Questions   Roomed by Benita BEACH CMA     History of Present Illness       Reason for visit:  Hemorrhoids    She eats 2-3 servings of fruits and vegetables daily.She consumes 1 sweetened beverage(s) daily.She exercises with enough effort to increase her heart rate 10 to 19 minutes per day.  She exercises with enough effort to increase her heart rate 4 days per week.   She is taking medications regularly.                     Objective    /89   Pulse 66   Temp 97.6  F (36.4  C) (Temporal)   Resp 16   Ht 1.715 m (5' 7.5\")   Wt 86.8 kg (191 lb 6.4 oz)   LMP 06/07/2018   SpO2 100%   BMI 29.54 kg/m    Body mass index is 29.54 kg/m .  Physical Exam  Constitutional:       Appearance: Normal appearance.   HENT:      Head: Normocephalic.      Nose: Nose normal. No congestion or rhinorrhea.   Eyes:      General:         Right eye: No discharge.         Left eye: No discharge.      Pupils: Pupils are equal, round, and reactive to light.   Pulmonary:      Effort: Pulmonary effort is normal.      Breath sounds: Normal breath sounds.   Genitourinary:     Comments: Noted 3 cm x 3 cm hemorrhoid  Musculoskeletal:        "  General: No swelling or tenderness. Normal range of motion.   Neurological:      General: No focal deficit present.      Mental Status: She is alert and oriented to person, place, and time.   Psychiatric:         Mood and Affect: Mood normal.         Behavior: Behavior normal.         Thought Content: Thought content normal.         Judgment: Judgment normal.                    Signed Electronically by: VAL Salomon CNP

## 2024-03-26 ENCOUNTER — VIRTUAL VISIT (OUTPATIENT)
Dept: FAMILY MEDICINE | Facility: CLINIC | Age: 54
End: 2024-03-26
Payer: COMMERCIAL

## 2024-03-26 DIAGNOSIS — N95.1 MENOPAUSAL SYNDROME (HOT FLASHES): Primary | ICD-10-CM

## 2024-03-26 DIAGNOSIS — G47.00 INSOMNIA, UNSPECIFIED TYPE: ICD-10-CM

## 2024-03-26 PROCEDURE — 99441 PR PHYSICIAN TELEPHONE EVALUATION 5-10 MIN: CPT | Mod: 93 | Performed by: STUDENT IN AN ORGANIZED HEALTH CARE EDUCATION/TRAINING PROGRAM

## 2024-03-26 RX ORDER — TRAZODONE HYDROCHLORIDE 50 MG/1
50 TABLET, FILM COATED ORAL AT BEDTIME
Qty: 90 TABLET | Refills: 1 | Status: SHIPPED | OUTPATIENT
Start: 2024-03-26 | End: 2024-09-25 | Stop reason: ALTCHOICE

## 2024-03-26 RX ORDER — SOY ISOFLAV/BCOHOSH/CISSUS QUA 198 MG
1 CAPSULE ORAL EVERY MORNING
Qty: 60 CAPSULE | Refills: 1 | Status: SHIPPED | OUTPATIENT
Start: 2024-03-26

## 2024-03-26 NOTE — PROGRESS NOTES
"Liane is a 53 year old who is being evaluated via a billable video visit.    How would you like to obtain your AVS? MyChart  If the video visit is dropped, the invitation should be resent by: Text to cell phone: [phone]  Will anyone else be joining your video visit? No  {If patient encounters technical issues they should call [phone] :870263}    {PROVIDER CHARTING PREFERENCE:868690}    Subjective   Liane is a 53 year old, presenting for the following health issues:  Menopausal Sx (Hot flashes)    Video Start Time: {video visit start/end time for provider to select:540833}    HPI         {SUPERLIST (Optional):750644}  {additonal problems for provider to add (Optional):294991}    {ROS Picklists (Optional):671711}      Objective           Vitals:  No vitals were obtained today due to virtual visit.    Physical Exam   {video visit exam brief selected:123799}    {Diagnostic Test Results (Optional):355564}      Video-Visit Details    Type of service:  Video Visit   Video End Time:{video visit start/end time for provider to select:551273}  Originating Location (pt. Location): {video visit patient location:994605::\"Home\"}  {PROVIDER LOCATION On-site should be selected for visits conducted from your clinic location or adjoining [facility] hospital, academic office, or other nearby [facility] building. Off-site should be selected for all other provider locations, including home:960828}  Distant Location (provider location):  {virtual location provider:968229}  Platform used for Video Visit: {Virtual Visit Platforms:377728::\"Hello [employer]\"}  Signed Electronically by: SOBEIDA BERMAN MD  {Email feedback regarding this note to [email or web address]   :001870}   "

## 2024-03-26 NOTE — PROGRESS NOTES
"Liane is a 53 year old who is being evaluated via a billable telephone visit.    What phone number would you like to be contacted at? [phone]   How would you like to obtain your AVS? Tiffany  Originating Location (pt. Location): Home    Distant Location (provider location):  On-site    Assessment & Plan     (N95.1) Menopausal syndrome (hot flashes)  (primary encounter diagnosis)  Comment: Chronic, uncontrolled. Pt was recommended to obtain OTC medication however due to financial constraints has not be able to afford and would like medication billed through insurance. Did discuss options of pt establishing care with OBGYN regarding the severity of symptoms and pt open- will place referral  Plan: Black Cohosh-SoyIsoflav-C Quad (ESTROVEN         MENOPAUSE & WEIGHT) 40- MG CAPS, Ob/Gyn          Referral            Dictation Disclaimer: Some of this Note has been completed with voice-recognition dictation software. Although errors are generally corrected real-time, there is the potential for a rare error to be present in the completed chart.                   BMI  Estimated body mass index is 29.54 kg/m  as calculated from the following:    Height as of 3/20/24: 1.715 m (5' 7.5\").    Weight as of 3/20/24: 86.8 kg (191 lb 6.4 oz).   Weight management plan: Patient was referred to their PCP to discuss a diet and exercise plan.          Subjective   Liane is a 53 year old, presenting for the following health issues:  Menopausal Sx (Hot flashes)    HPI       Patient is requesting to get medication for her hot flashes.     Pt reports that she has having menopausal symptoms and reports being encouraged to  an over the counter medication. She reports due to financial constraints she has been unable to having her medication. She endorses symptoms of hot flashes, irritability, and or symptoms. Pt has not established care with obgyn but is open to seeing them.                ROS: 10 point ROS neg other " than the symptoms noted above in the HPI.        Objective           Vitals:  No vitals were obtained today due to virtual visit.    Physical Exam   General: Alert and no distress //Respiratory: No audible wheeze, cough, or shortness of breath // Psychiatric:  Appropriate affect, tone, and pace of words            Phone call duration: 10 minutes  Signed Electronically by: SOBEIDA BERMAN MD

## 2024-04-02 ENCOUNTER — OFFICE VISIT (OUTPATIENT)
Dept: FAMILY MEDICINE | Facility: CLINIC | Age: 54
End: 2024-04-02
Payer: COMMERCIAL

## 2024-04-02 VITALS
OXYGEN SATURATION: 99 % | BODY MASS INDEX: 27.65 KG/M2 | DIASTOLIC BLOOD PRESSURE: 71 MMHG | RESPIRATION RATE: 18 BRPM | HEART RATE: 78 BPM | TEMPERATURE: 98.3 F | HEIGHT: 68 IN | SYSTOLIC BLOOD PRESSURE: 105 MMHG | WEIGHT: 182.4 LBS

## 2024-04-02 DIAGNOSIS — R19.7 DIARRHEA OF PRESUMED INFECTIOUS ORIGIN: ICD-10-CM

## 2024-04-02 DIAGNOSIS — R30.0 DYSURIA: Primary | ICD-10-CM

## 2024-04-02 LAB
ALBUMIN UR-MCNC: 100 MG/DL
APPEARANCE UR: ABNORMAL
BACTERIA #/AREA URNS HPF: ABNORMAL /HPF
BILIRUB UR QL STRIP: NEGATIVE
COLOR UR AUTO: YELLOW
GLUCOSE UR STRIP-MCNC: NEGATIVE MG/DL
HGB UR QL STRIP: ABNORMAL
KETONES UR STRIP-MCNC: ABNORMAL MG/DL
LEUKOCYTE ESTERASE UR QL STRIP: ABNORMAL
NITRATE UR QL: POSITIVE
PH UR STRIP: 5.5 [PH] (ref 5–7)
RBC #/AREA URNS AUTO: ABNORMAL /HPF
SP GR UR STRIP: >=1.03 (ref 1–1.03)
SQUAMOUS #/AREA URNS AUTO: ABNORMAL /LPF
UROBILINOGEN UR STRIP-ACNC: 0.2 E.U./DL
WBC #/AREA URNS AUTO: >100 /HPF

## 2024-04-02 PROCEDURE — 87186 SC STD MICRODIL/AGAR DIL: CPT | Performed by: FAMILY MEDICINE

## 2024-04-02 PROCEDURE — 81001 URINALYSIS AUTO W/SCOPE: CPT | Performed by: FAMILY MEDICINE

## 2024-04-02 PROCEDURE — 87086 URINE CULTURE/COLONY COUNT: CPT | Performed by: FAMILY MEDICINE

## 2024-04-02 PROCEDURE — 99213 OFFICE O/P EST LOW 20 MIN: CPT | Performed by: FAMILY MEDICINE

## 2024-04-02 RX ORDER — FERROUS GLUCONATE 324(38)MG
324 TABLET ORAL
COMMUNITY
End: 2024-09-25 | Stop reason: ALTCHOICE

## 2024-04-02 RX ORDER — NITROFURANTOIN 25; 75 MG/1; MG/1
100 CAPSULE ORAL 2 TIMES DAILY
Qty: 10 CAPSULE | Refills: 0 | Status: SHIPPED | OUTPATIENT
Start: 2024-04-02 | End: 2024-04-07

## 2024-04-02 ASSESSMENT — PAIN SCALES - GENERAL: PAINLEVEL: NO PAIN (0)

## 2024-04-02 NOTE — LETTER
April 4, 2024    Liane Key  [address]  [place] [address]          Dear ,    We are writing to inform you of your test results.  You have a Bladder Infection   Take Macrobid   Urine culture  is pending       Resulted Orders   UA Macroscopic with reflex to Microscopic and Culture - Clinic Collect   Result Value Ref Range    Color Urine Yellow Colorless, Straw, Light Yellow, Yellow    Appearance Urine Cloudy (A) Clear    Glucose Urine Negative Negative mg/dL    Bilirubin Urine Negative Negative    Ketones Urine Trace (A) Negative mg/dL    Specific Gravity Urine >=1.030 1.003 - 1.035    Blood Urine Small (A) Negative    pH Urine 5.5 5.0 - 7.0    Protein Albumin Urine 100 (A) Negative mg/dL    Urobilinogen Urine 0.2 0.2, 1.0 E.U./dL    Nitrite Urine Positive (A) Negative    Leukocyte Esterase Urine Large (A) Negative   Urine Microscopic Exam   Result Value Ref Range    Bacteria Urine Few (A) None Seen /HPF    RBC Urine 10-25 (A) 0-2 /HPF /HPF    WBC Urine >100 (A) 0-5 /HPF /HPF    Squamous Epithelials Urine Few (A) None Seen /LPF   Urine Culture   Result Value Ref Range    Culture 50,000-100,000 CFU/mL Escherichia coli (A)        If you have any questions or concerns, please call the clinic at the number listed above.       Sincerely,      Nahomi Castañeda MD

## 2024-04-02 NOTE — PROGRESS NOTES
"  Assessment & Plan     Dysuria  UC pending   SEE [facility] care orders  The potential side effects of this medication have been discussed with the patient.  Call if any significant problems with these are experienced.     - UA Macroscopic with reflex to Microscopic and Culture - Clinic Collect  - Urine Microscopic Exam  - Urine Culture  - nitroFURantoin macrocrystal-monohydrate (MACROBID) 100 MG capsule; Take 1 capsule (100 mg) by mouth 2 times daily for 5 days    Diarrhea of presumed infectious origin  Advised Drink ;lots of Fluids  It is getting better  Follow up 1 week if not better/sooner if worse    Subjective   Liane is a 53 year old, presenting for the following health issues:  Urinary Problem      4/2/2024    12:56 PM   Additional Questions   Roomed by Mamta LOPEZ       Genitourinary - Female  Onset/Duration: One day ago  Description:   Painful urination (Dysuria): YES           Frequency: YES  Blood in urine (Hematuria): No  Delay in urine (Hesitency): YES  Progression of Symptoms:  same  Accompanying Signs & Symptoms:  Fever/chills: No  Flank pain: YES  Nausea and vomiting: YES  Vaginal symptoms: none  Abdominal/Pelvic Pain: YES-upper abdomen  History:   History of frequent UTI s: No  History of kidney stones: possibly  Sexually Active: No  Possibility of pregnancy: No  Precipitating or alleviating factors: None  Therapies tried and outcome:  none   Has had Diarrhea off and on  Sunday vomited 3 times  Monday x 1 time  Diarrhea -twice-Loose watery  Yesterday had Diarrhea 4 times  No fever  No chills  Mild abdominal cramps  Rest of the ROS is Negative except see above and Problem list [stable]      Objective    /71   Pulse 78   Temp 98.3  F (36.8  C) (Temporal)   Resp 18   Ht 1.724 m (5' 7.87\")   Wt 82.7 kg (182 lb 6.4 oz)   LMP 06/07/2018   SpO2 99%   BMI 27.84 kg/m    Body mass index is 27.84 kg/m .  Physical Exam   GENERAL: alert and no distress  NECK: no adenopathy, no asymmetry, masses, or " scars  RESP: lungs clear to auscultation - no rales, rhonchi or wheezes  CV: regular rate and rhythm, normal S1 S2, no S3 or S4, no murmur, click or rub, no peripheral edema  ABDOMEN: soft, nontender, no hepatosplenomegaly, no masses and bowel sounds normal  MS: no gross musculoskeletal defects noted, no edema    Results for orders placed or performed in visit on 04/02/24   UA Macroscopic with reflex to Microscopic and Culture - Clinic Collect     Status: Abnormal    Specimen: Urine, Midstream   Result Value Ref Range    Color Urine Yellow Colorless, Straw, Light Yellow, Yellow    Appearance Urine Cloudy (A) Clear    Glucose Urine Negative Negative mg/dL    Bilirubin Urine Negative Negative    Ketones Urine Trace (A) Negative mg/dL    Specific Gravity Urine >=1.030 1.003 - 1.035    Blood Urine Small (A) Negative    pH Urine 5.5 5.0 - 7.0    Protein Albumin Urine 100 (A) Negative mg/dL    Urobilinogen Urine 0.2 0.2, 1.0 E.U./dL    Nitrite Urine Positive (A) Negative    Leukocyte Esterase Urine Large (A) Negative   Urine Microscopic Exam     Status: Abnormal   Result Value Ref Range    Bacteria Urine Few (A) None Seen /HPF    RBC Urine 10-25 (A) 0-2 /HPF /HPF    WBC Urine >100 (A) 0-5 /HPF /HPF    Squamous Epithelials Urine Few (A) None Seen /LPF           Signed Electronically by: Nahomi Castañeda MD

## 2024-04-03 ENCOUNTER — TELEPHONE (OUTPATIENT)
Dept: FAMILY MEDICINE | Facility: CLINIC | Age: 54
End: 2024-04-03
Payer: COMMERCIAL

## 2024-04-03 NOTE — TELEPHONE ENCOUNTER
RN attempted to call patient, did not answer. Did not leave a message. Culture results came through, To wait for providers response.       Fabiola Sandoval RN on 4/3/2024 at 3:55 PM

## 2024-04-03 NOTE — TELEPHONE ENCOUNTER
----- Message from Nahomi Castañeda MD sent at 4/3/2024 12:15 PM CDT -----  Please call  Positive UTI  Take Macrobid  Urine culture  is pending   Sincerely,  Nahomi Castañeda MD

## 2024-04-04 ENCOUNTER — TELEPHONE (OUTPATIENT)
Dept: FAMILY MEDICINE | Facility: CLINIC | Age: 54
End: 2024-04-04
Payer: COMMERCIAL

## 2024-04-04 LAB — BACTERIA UR CULT: ABNORMAL

## 2024-04-04 NOTE — TELEPHONE ENCOUNTER
----- Message from Nahomi Castañeda MD sent at 4/4/2024  2:04 PM CDT -----  Please take macrobid as recommended for Bladder Infection  Sincerely,  Nahomi Castañeda MD      
RN called and spoke with patient.    RN reviewed providers message.    Patient verbalized understanding.    Rigoberto Hoover RN, BSN, PHN  [facility]  
Statement Selected

## 2024-04-05 NOTE — TELEPHONE ENCOUNTER
"Garry review culture results:  \"Please take macrobid as recommended for Bladder Infection  Sincerely,  Nahomi Castañeda MD  4/4/2024  2:04 PM CDT\"    Results have not been read via [employer]. Called patient. Left voice message to return call at [phone].    Sylvia Stone RN   "

## 2024-05-06 DIAGNOSIS — E89.0 HYPOTHYROIDISM FOLLOWING RADIOIODINE THERAPY: Chronic | ICD-10-CM

## 2024-05-07 RX ORDER — LEVOTHYROXINE SODIUM 112 UG/1
112 TABLET ORAL DAILY
Qty: 90 TABLET | Refills: 1 | Status: SHIPPED | OUTPATIENT
Start: 2024-05-07

## 2024-06-13 ENCOUNTER — OFFICE VISIT (OUTPATIENT)
Dept: FAMILY MEDICINE | Facility: CLINIC | Age: 54
End: 2024-06-13
Payer: COMMERCIAL

## 2024-06-13 VITALS
SYSTOLIC BLOOD PRESSURE: 105 MMHG | OXYGEN SATURATION: 100 % | BODY MASS INDEX: 26.61 KG/M2 | RESPIRATION RATE: 16 BRPM | HEART RATE: 79 BPM | TEMPERATURE: 98.7 F | WEIGHT: 175.6 LBS | DIASTOLIC BLOOD PRESSURE: 73 MMHG | HEIGHT: 68 IN

## 2024-06-13 DIAGNOSIS — F43.9 STRESS: Primary | ICD-10-CM

## 2024-06-13 DIAGNOSIS — F41.9 ANXIETY: ICD-10-CM

## 2024-06-13 DIAGNOSIS — R53.83 FATIGUE, UNSPECIFIED TYPE: ICD-10-CM

## 2024-06-13 DIAGNOSIS — E03.9 HYPOTHYROIDISM, UNSPECIFIED TYPE: ICD-10-CM

## 2024-06-13 DIAGNOSIS — Z13.1 SCREENING FOR DIABETES MELLITUS: ICD-10-CM

## 2024-06-13 DIAGNOSIS — Z71.1 CONCERN ABOUT STD IN FEMALE WITHOUT DIAGNOSIS: ICD-10-CM

## 2024-06-13 DIAGNOSIS — R63.0 DECREASED APPETITE: ICD-10-CM

## 2024-06-13 LAB
ALBUMIN SERPL BCG-MCNC: 4.4 G/DL (ref 3.5–5.2)
ALP SERPL-CCNC: 47 U/L (ref 40–150)
ALT SERPL W P-5'-P-CCNC: 15 U/L (ref 0–50)
ANION GAP SERPL CALCULATED.3IONS-SCNC: 11 MMOL/L (ref 7–15)
AST SERPL W P-5'-P-CCNC: 18 U/L (ref 0–45)
BASOPHILS # BLD AUTO: 0 10E3/UL (ref 0–0.2)
BASOPHILS NFR BLD AUTO: 0 %
BILIRUB SERPL-MCNC: 1.1 MG/DL
BUN SERPL-MCNC: 8.7 MG/DL (ref 6–20)
CALCIUM SERPL-MCNC: 9.5 MG/DL (ref 8.6–10)
CHLORIDE SERPL-SCNC: 105 MMOL/L (ref 98–107)
CREAT SERPL-MCNC: 0.75 MG/DL (ref 0.51–0.95)
DEPRECATED HCO3 PLAS-SCNC: 24 MMOL/L (ref 22–29)
EGFRCR SERPLBLD CKD-EPI 2021: >90 ML/MIN/1.73M2
EOSINOPHIL # BLD AUTO: 0.2 10E3/UL (ref 0–0.7)
EOSINOPHIL NFR BLD AUTO: 2 %
ERYTHROCYTE [DISTWIDTH] IN BLOOD BY AUTOMATED COUNT: 12.4 % (ref 10–15)
GLUCOSE SERPL-MCNC: 111 MG/DL (ref 70–99)
HBA1C MFR BLD: 5 % (ref 0–5.6)
HCT VFR BLD AUTO: 37.1 % (ref 35–47)
HGB BLD-MCNC: 11.9 G/DL (ref 11.7–15.7)
IMM GRANULOCYTES # BLD: 0 10E3/UL
IMM GRANULOCYTES NFR BLD: 0 %
LYMPHOCYTES # BLD AUTO: 1.4 10E3/UL (ref 0.8–5.3)
LYMPHOCYTES NFR BLD AUTO: 21 %
MCH RBC QN AUTO: 29.9 PG (ref 26.5–33)
MCHC RBC AUTO-ENTMCNC: 32.1 G/DL (ref 31.5–36.5)
MCV RBC AUTO: 93 FL (ref 78–100)
MONOCYTES # BLD AUTO: 0.6 10E3/UL (ref 0–1.3)
MONOCYTES NFR BLD AUTO: 9 %
NEUTROPHILS # BLD AUTO: 4.7 10E3/UL (ref 1.6–8.3)
NEUTROPHILS NFR BLD AUTO: 68 %
PLATELET # BLD AUTO: 281 10E3/UL (ref 150–450)
POTASSIUM SERPL-SCNC: 3.8 MMOL/L (ref 3.4–5.3)
PROT SERPL-MCNC: 7.6 G/DL (ref 6.4–8.3)
RBC # BLD AUTO: 3.98 10E6/UL (ref 3.8–5.2)
SODIUM SERPL-SCNC: 140 MMOL/L (ref 135–145)
T4 FREE SERPL-MCNC: 1.6 NG/DL (ref 0.9–1.7)
TSH SERPL DL<=0.005 MIU/L-ACNC: 0.62 UIU/ML (ref 0.3–4.2)
WBC # BLD AUTO: 7 10E3/UL (ref 4–11)

## 2024-06-13 PROCEDURE — 84443 ASSAY THYROID STIM HORMONE: CPT | Performed by: FAMILY MEDICINE

## 2024-06-13 PROCEDURE — 99214 OFFICE O/P EST MOD 30 MIN: CPT | Performed by: FAMILY MEDICINE

## 2024-06-13 PROCEDURE — 85025 COMPLETE CBC W/AUTO DIFF WBC: CPT | Performed by: FAMILY MEDICINE

## 2024-06-13 PROCEDURE — 80053 COMPREHEN METABOLIC PANEL: CPT | Performed by: FAMILY MEDICINE

## 2024-06-13 PROCEDURE — 84439 ASSAY OF FREE THYROXINE: CPT | Performed by: FAMILY MEDICINE

## 2024-06-13 PROCEDURE — 83036 HEMOGLOBIN GLYCOSYLATED A1C: CPT | Performed by: FAMILY MEDICINE

## 2024-06-13 PROCEDURE — 36415 COLL VENOUS BLD VENIPUNCTURE: CPT | Performed by: FAMILY MEDICINE

## 2024-06-13 ASSESSMENT — PATIENT HEALTH QUESTIONNAIRE - PHQ9
SUM OF ALL RESPONSES TO PHQ QUESTIONS 1-9: 15
SUM OF ALL RESPONSES TO PHQ QUESTIONS 1-9: 15
10. IF YOU CHECKED OFF ANY PROBLEMS, HOW DIFFICULT HAVE THESE PROBLEMS MADE IT FOR YOU TO DO YOUR WORK, TAKE CARE OF THINGS AT HOME, OR GET ALONG WITH OTHER PEOPLE: EXTREMELY DIFFICULT

## 2024-06-13 NOTE — PATIENT INSTRUCTIONS
We will send you lab results    Advise scheduling an appointment to establish new primary care provider     Advised scheduling with infectious disease specialist and counselor / therapist

## 2024-06-13 NOTE — PROGRESS NOTES
"      John Rob is a 54 year old, presenting for the following health issues:  Medication Request      6/13/2024     7:09 AM   Additional Questions   Roomed by Pedro Luis     History of Present Illness       Reason for visit:  Stress and get on prep    She eats 2-3 servings of fruits and vegetables daily.She consumes 0 sweetened beverage(s) daily.She exercises with enough effort to increase her heart rate 10 to 19 minutes per day.  She exercises with enough effort to increase her heart rate 4 days per week.   She is taking medications regularly.     PT Wants stress test      Stress at home and job      Taking care of [place]    Lots of rules to follow at work    Sleep a lot    Feels depressed    Sleep variable    No appetite    Food does not taste good  Swallow okay    No heartburn    Works with disabled    5 hours of sleep        Objective    /73 (BP Location: Right arm, Patient Position: Sitting, Cuff Size: Adult Large)   Pulse 79   Temp 98.7  F (37.1  C) (Oral)   Resp 16   Ht 1.724 m (5' 7.87\")   Wt 79.7 kg (175 lb 9.6 oz)   LMP 06/07/2018   SpO2 100%   BMI 26.80 kg/m    Body mass index is 26.8 kg/m .  Physical Exam  Constitutional:       Appearance: She is well-developed.   HENT:      Head: Normocephalic and atraumatic.   Eyes:      Conjunctiva/sclera: Conjunctivae normal.   Neck:      Vascular: No carotid bruit.   Cardiovascular:      Rate and Rhythm: Normal rate and regular rhythm.      Heart sounds: Normal heart sounds.   Pulmonary:      Effort: Pulmonary effort is normal. No respiratory distress.      Breath sounds: Normal breath sounds.   Abdominal:      Palpations: Abdomen is soft.      Tenderness: There is abdominal tenderness (mild subjective discomfort).   Neurological:      Mental Status: She is alert and oriented to person, place, and time.             ASSESSMENT / PLAN:  (F43.9) Stress  (primary encounter diagnosis)  Comment: patient apparently does not have a primary provider. "  Advised she schedule appointment soon to establish with one of our new providers. Explained I am not taking new patients.  Did provide referral for seeing counselor/therapist.   Plan: Adult Mental Health  Referral             (F41.9) Anxiety  Comment: as above   Plan: Adult Mental Health  Referral             (R63.0) Decreased appetite  Comment: patient has been losing wt but not much appetite so eating less than usual   Plan: check labs, follow up with new provider     (R53.83) Fatigue, unspecified type  Comment: check   Plan: CBC with Platelets & Differential,         Comprehensive metabolic panel             (E03.9) Hypothyroidism, unspecified type  Comment: check labs   Plan: TSH, T4 free        Adjust dose if needed     (Z13.1) Screening for diabetes mellitus  Comment: check   Plan: Hemoglobin A1c             (Z71.1) Concern about STD in female without diagnosis  Comment: patient wondering about prep.  Not currently having intercourse.  Did referral for inf dis or she could also talk with new primary provider about this.   Plan: Adult Infectious Disease  Referral               I reviewed the patient's medications, allergies, medical history, family history, and social history.    Kwesi Ram MD          Signed Electronically by: Kwesi Ram MD

## 2024-06-14 NOTE — RESULT ENCOUNTER NOTE
Thyroid tests are normal.  Stay on same dose of thyroid medication.    Other labs are fine.    Normal hemoglobin a1c means no diabetes or prediabetes.    Kwesi Ram MD

## 2024-06-14 NOTE — CONFIDENTIAL NOTE
DIAGNOSIS:  Concern about STD in female without diagnosis    DATE RECEIVED:  06.26.2024    NOTES (Gather within 2 years) STATUS DETAILS   OFFICE NOTE from referring provider   Internal 06.14.2024 Kwesi Ram MD    LABS (any labs) Internal    MEDICATION LIST Internal

## 2024-06-22 ENCOUNTER — HEALTH MAINTENANCE LETTER (OUTPATIENT)
Age: 54
End: 2024-06-22

## 2024-06-25 ENCOUNTER — TELEPHONE (OUTPATIENT)
Dept: FAMILY MEDICINE | Facility: CLINIC | Age: 54
End: 2024-06-25
Payer: COMMERCIAL

## 2024-06-25 NOTE — TELEPHONE ENCOUNTER
"Patient calling, she was seen by Dr Ram 6/13/24, she says he told her to find a new provider as he is not taking new primary patients.        She has multiple concerns regarding wt loss, worried about her heart, having a lot of stress.    She has a mental health virtual visit today and has video visit with infectious disease tomorrow.    I advised perhaps internal medicine would be a good fit with all of her concerns.   No openings I can access with provider Marsha Russell.    Routed to Marsha to review patient's chart and advise if team can reach out to patient to schedule in an \"approval req\" spot with her (I am not finding any routine openings in the next 2 weeks, patient is very anxious to be seen).    Maritza HERNANDEZ RN  [facility] Triage    "

## 2024-06-26 ENCOUNTER — PRE VISIT (OUTPATIENT)
Dept: INFECTIOUS DISEASES | Facility: CLINIC | Age: 54
End: 2024-06-26
Payer: COMMERCIAL

## 2024-06-26 ENCOUNTER — VIRTUAL VISIT (OUTPATIENT)
Dept: INFECTIOUS DISEASES | Facility: CLINIC | Age: 54
End: 2024-06-26
Attending: STUDENT IN AN ORGANIZED HEALTH CARE EDUCATION/TRAINING PROGRAM
Payer: COMMERCIAL

## 2024-06-26 VITALS — HEIGHT: 68 IN | BODY MASS INDEX: 25.76 KG/M2 | WEIGHT: 170 LBS

## 2024-06-26 DIAGNOSIS — Z71.1 CONCERN ABOUT STD IN FEMALE WITHOUT DIAGNOSIS: ICD-10-CM

## 2024-06-26 ASSESSMENT — PAIN SCALES - GENERAL: PAINLEVEL: NO PAIN (0)

## 2024-06-26 NOTE — PROGRESS NOTES
Patients phone was not connecting to [facility] or [facility]. Since this visit is for sensitive issues, we discussed to re-schedule to an inperson visit than telephone. Patient agreeable.   Kaia Mack MD

## 2024-06-26 NOTE — LETTER
6/26/2024       RE: Liane Key  [address]  [place] [address]     Dear Colleague,    Thank you for referring your patient, Liane Key, to the [facility]. Please see a copy of my visit note below.    Patients phone was not connecting to [facility] or [facility]. Since this visit is for sensitive issues, we discussed to re-schedule to an inperson visit than telephone. Patient agreeable.   Kaia Mack MD

## 2024-06-26 NOTE — NURSING NOTE
Pt concerned with weight loss and not having energy/feeling week. Pt has a stress test coming up per pt. Affecting home and work life. Pt is very concerned per pt    Is the patient currently in the state of [place]? YES    Visit mode:VIDEO    If the visit is dropped, the patient can be reconnected by: VIDEO VISIT: Text to cell phone:   Telephone Information:   Mobile [phone]       Will anyone else be joining the visit? NO  (If patient encounters technical issues they should call [phone] :[phone])    How would you like to obtain your AVS? MyChart    Are changes needed to the allergy or medication list? No    Are refills needed on medications prescribed by this physician? NO    Reason for visit: Consult    No other vitals to report per pt    Ana DEJESUSF

## 2024-07-05 ENCOUNTER — LAB (OUTPATIENT)
Dept: LAB | Facility: CLINIC | Age: 54
End: 2024-07-05
Payer: COMMERCIAL

## 2024-07-05 ENCOUNTER — OFFICE VISIT (OUTPATIENT)
Dept: INFECTIOUS DISEASES | Facility: CLINIC | Age: 54
End: 2024-07-05
Attending: STUDENT IN AN ORGANIZED HEALTH CARE EDUCATION/TRAINING PROGRAM
Payer: COMMERCIAL

## 2024-07-05 VITALS
WEIGHT: 175.2 LBS | HEART RATE: 89 BPM | SYSTOLIC BLOOD PRESSURE: 131 MMHG | OXYGEN SATURATION: 99 % | TEMPERATURE: 98.4 F | DIASTOLIC BLOOD PRESSURE: 84 MMHG | BODY MASS INDEX: 27.04 KG/M2

## 2024-07-05 DIAGNOSIS — Z71.1 CONCERN ABOUT STD IN FEMALE WITHOUT DIAGNOSIS: ICD-10-CM

## 2024-07-05 DIAGNOSIS — Z29.81 ENCOUNTER FOR HIV SCREENING AND DISCUSSION OF PRE-EXPOSURE PROPHYLAXIS FOR HIV: Primary | ICD-10-CM

## 2024-07-05 DIAGNOSIS — Z11.4 ENCOUNTER FOR HIV SCREENING AND DISCUSSION OF PRE-EXPOSURE PROPHYLAXIS FOR HIV: Primary | ICD-10-CM

## 2024-07-05 DIAGNOSIS — Z71.89 ENCOUNTER FOR HIV SCREENING AND DISCUSSION OF PRE-EXPOSURE PROPHYLAXIS FOR HIV: Primary | ICD-10-CM

## 2024-07-05 LAB
HBV SURFACE AB SERPL IA-ACNC: >1000 M[IU]/ML
HBV SURFACE AB SERPL IA-ACNC: REACTIVE M[IU]/ML
HBV SURFACE AG SERPL QL IA: NONREACTIVE
HIV 1+2 AB+HIV1 P24 AG SERPL QL IA: NONREACTIVE

## 2024-07-05 PROCEDURE — 36415 COLL VENOUS BLD VENIPUNCTURE: CPT | Performed by: PATHOLOGY

## 2024-07-05 PROCEDURE — 99213 OFFICE O/P EST LOW 20 MIN: CPT | Performed by: STUDENT IN AN ORGANIZED HEALTH CARE EDUCATION/TRAINING PROGRAM

## 2024-07-05 PROCEDURE — 87491 CHLMYD TRACH DNA AMP PROBE: CPT | Performed by: STUDENT IN AN ORGANIZED HEALTH CARE EDUCATION/TRAINING PROGRAM

## 2024-07-05 PROCEDURE — 99204 OFFICE O/P NEW MOD 45 MIN: CPT | Performed by: STUDENT IN AN ORGANIZED HEALTH CARE EDUCATION/TRAINING PROGRAM

## 2024-07-05 PROCEDURE — 86592 SYPHILIS TEST NON-TREP QUAL: CPT | Performed by: STUDENT IN AN ORGANIZED HEALTH CARE EDUCATION/TRAINING PROGRAM

## 2024-07-05 PROCEDURE — 87389 HIV-1 AG W/HIV-1&-2 AB AG IA: CPT | Performed by: STUDENT IN AN ORGANIZED HEALTH CARE EDUCATION/TRAINING PROGRAM

## 2024-07-05 PROCEDURE — 99000 SPECIMEN HANDLING OFFICE-LAB: CPT | Performed by: PATHOLOGY

## 2024-07-05 PROCEDURE — 87340 HEPATITIS B SURFACE AG IA: CPT | Performed by: STUDENT IN AN ORGANIZED HEALTH CARE EDUCATION/TRAINING PROGRAM

## 2024-07-05 PROCEDURE — 86780 TREPONEMA PALLIDUM: CPT | Performed by: STUDENT IN AN ORGANIZED HEALTH CARE EDUCATION/TRAINING PROGRAM

## 2024-07-05 PROCEDURE — 86780 TREPONEMA PALLIDUM: CPT | Mod: 90 | Performed by: PATHOLOGY

## 2024-07-05 PROCEDURE — 86706 HEP B SURFACE ANTIBODY: CPT | Performed by: STUDENT IN AN ORGANIZED HEALTH CARE EDUCATION/TRAINING PROGRAM

## 2024-07-05 RX ORDER — EMTRICITABINE AND TENOFOVIR DISOPROXIL FUMARATE 200; 300 MG/1; MG/1
1 TABLET, FILM COATED ORAL DAILY
Qty: 30 TABLET | Refills: 11 | Status: SHIPPED | OUTPATIENT
Start: 2024-07-05

## 2024-07-05 ASSESSMENT — PAIN SCALES - GENERAL: PAINLEVEL: NO PAIN (0)

## 2024-07-05 NOTE — NURSING NOTE
Chief Complaint   Patient presents with    New Patient     /84   Pulse 89   Temp 98.4  F (36.9  C) (Oral)   Wt 79.5 kg (175 lb 3.2 oz)   LMP 06/07/2018   SpO2 99%   BMI 27.04 kg/m    Tammy Hackett MA on 7/5/2024 at 4:21 PM    
pending progress

## 2024-07-05 NOTE — LETTER
7/5/2024       RE: Liane Key  [address] [place] [address]     Dear Colleague,    Thank you for referring your patient, Liane Key, to the [facility] INFECTIOUS DISEASE CLINIC [place] at [facility]. Please see a copy of my visit note below.            [facility] IN[facility] DISE[facility] CLINIC [place]    [address] [address]  Phone: [phone]  Fax: [phone]      Today's Date: 07/05/2024    Recommendations:     We will test for common STDs today including HIV, hepatitis B, hepatitis C, chlamydia/gonorrhea, and syphilis. Once confirmed that she is HIV negative, we will initiate Truvada once daily for PREP.   Follow up in 2 months to review all results and repeat bloodwork.  Check anti-Hbs antibodies today to establish immune status to Hepatitis B and re-initiate vaccination if needed.      Thank you for involving Infectious Disease in the care of this patient.     Dr. Kaia Mack  Division of Infectious Disease  [facility]      Assessment:    54 year old lady with no significant past medical history except graves disease who presents to initiate PREP for HIV. Although she does not have many risk factors for HIV, given her significant concerns (cannot reliably use barrier protection, concerns that her regular partner has other partners, unknown HIV status of her partner, and prior history of STDs), we will plan to initiate Truvada. We discussed that after initial screening tests, she would need to take the medication regularly and have blood tests done every 6 months. Potential side effects including GI upset, renal disease, osteoporosis, and worsening lipid profile were explained as well. We also discussed that prep would not protect her against other STDs.     In addition, we discussed today that she may need re-vaccination for Hepatitis B. We will check the antibody levels for  Hepatitis B and re-vaccinate afterwards if needed.     - Immunization status    Most Recent Immunizations   Administered Date(s) Administered    COVID-19 Vaccine (Unruly) 01/07/2022    Hepatitis B, Adult 02/23/2009    TDAP (Adacel,Boostrix) 10/03/2022    TDAP Vaccine (Adacel) 03/01/2012        Face to face time 25 mins. Total time including chart review, care-coordination and documentation time on the date of encounter - 45 mins     -------------------------------------------------------------------------------------------------------------------    Reason for consult / Chief complaint:   Consulted by Dr. Kelly Buckner for Prep for HIV.     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     Ms. Key reports that she has not been sexually active for the last year but would like to resume her sexual activity. She is  since 2003 and her  is the only expected partner. She thinks that her  has other partners, hence, would like to initiate PREP for her protection against HIV.     She reports no significant medical issues except hypothyroidism, her sexual history includes only men, less than 10 during her lifetime, only 1 partner at a time. She does not regularly use barrier protection, especially with her  who prefers not to use condoms. She has a history of Chlamydia 3-4 years ago but no other STDs. She would like to be tested for STDs today. She has no PCP.       Patient Active Problem List   Diagnosis    Hypothyroidism following radioiodine therapy    Class 1 obesity due to excess calories without serious comorbidity with body mass index (BMI) of 31.0 to 31.9 in adult    Elevated blood pressure reading without diagnosis of hypertension    History of Graves' disease    Chronic insomnia    Anxiety       Allergies:   No  Known Allergies      Medications:  Current Outpatient Medications   Medication Sig Dispense Refill    ACETAMINOPHEN EXTRA STRENGTH 500 MG tablet Take 1,200 mg by mouth every 6 hours as needed      Black Cohosh-SoyIsoflav-C Quad (ESTROVEN MENOPAUSE & WEIGHT) 40- MG CAPS Take 1 capsule by mouth every morning 60 capsule 1    ferrous gluconate (FERGON) 324 (38 Fe) MG tablet Take 324 mg by mouth daily (with breakfast)      hydrocortisone, Perianal, (HYDROCORTISONE) 2.5 % cream Place rectally 2 times daily as needed for hemorrhoids 28 g 2    levothyroxine (SYNTHROID/LEVOTHROID) 112 MCG tablet Take 1 tablet (112 mcg) by mouth daily 90 tablet 1    Multiple Vitamins-Iron (ONE-TABLET-DAILY/IRON PO)       tiZANidine (ZANAFLEX) 2 MG tablet Take 1 tablet (2 mg) by mouth 3 times daily 20 tablet 0    traZODone (DESYREL) 50 MG tablet Take 1 tablet (50 mg) by mouth at bedtime 90 tablet 1         Immunizations:  Immunization History   Administered Date(s) Administered    COVID-19 Vaccine (mo9 (moKredit)) 01/07/2022    Hepatitis B, Adult 02/23/2009    TDAP (Adacel,Boostrix) 10/03/2022    TDAP Vaccine (Adacel) 03/01/2012         Examination:  Vital signs:   /84   Pulse 89   Temp 98.4  F (36.9  C) (Oral)   Wt 79.5 kg (175 lb 3.2 oz)   LMP 06/07/2018   SpO2 99%   BMI 27.04 kg/m      Constitutional: Patient in no distress  Eyes: not pale, not jaundiced  Neck: no lymphadenopathy  CVS: no added sounds  RS: clear  Abdomen: soft, BS+  Skin: no rash  Extremities: no pedal edema  Psych: Alert and oriented x 3      Laboratory:  Hematology:  Recent Labs   Lab Test 06/13/24  0742 09/20/23  1613 09/19/22  1549 08/11/22  1056 12/26/18  0830 06/26/18  1822 04/05/18  1837 08/15/16  1151   WBC 7.0 8.2 7.0 5.7  --   --  7.2 4.4   ANEU  --   --   --   --   --   --  4.3  --    ALYM  --   --   --   --   --   --  1.9  --    TERRY  --   --   --   --   --   --  0.9  --    AEOS  --   --   --   --   --   --  0.1  --    HGB 11.9 12.1 9.5* 12.6 12.3  10.5* 9.2* 11.2*   HCT 37.1 37.4 30.0* 38.4  --   --  29.7* 33.9*    308 516* 276  --   --  334 257       Chemistry:  Last Comprehensive Metabolic Panel:  Lab Results   Component Value Date     06/13/2024    POTASSIUM 3.8 06/13/2024    CHLORIDE 105 06/13/2024    CO2 24 06/13/2024    ANIONGAP 11 06/13/2024     (H) 06/13/2024    BUN 8.7 06/13/2024    CR 0.75 06/13/2024    GFRESTIMATED >90 06/13/2024    SAMPSON 9.5 06/13/2024       Liver Function Studies -   Recent Labs   Lab Test 06/13/24  0742   PROTTOTAL 7.6   ALBUMIN 4.4   BILITOTAL 1.1   ALKPHOS 47   AST 18   ALT 15       Kaia Mack MD

## 2024-07-05 NOTE — PROGRESS NOTES
[facility] INFECTIOUS DISEASE CLINIC [address] [address]  Phone: [phone]  Fax: [phone]      Today's Date: 07/05/2024    Recommendations:     We will test for common STDs today including HIV, hepatitis B, hepatitis C, chlamydia/gonorrhea, and syphilis. Once confirmed that she is HIV negative, we will initiate Truvada once daily for PREP.   Follow up in 2 months to review all results and repeat bloodwork.  Check anti-Hbs antibodies today to establish immune status to Hepatitis B and re-initiate vaccination if needed.      RTC 2 month     Thank you for involving Infectious Disease in the care of this patient.     Dr. Kaia Mack  Division of Infectious Disease  [facility]      Assessment:    54 year old lady with no significant past medical history except graves disease who presents to initiate PREP for HIV. Although she does not have many risk factors for HIV, given her significant concerns (cannot reliably use barrier protection, concerns that her regular partner has other partners, unknown HIV status of her partner, and prior history of STDs), we will plan to initiate Truvada. We discussed that after initial screening tests, she would need to take the medication regularly and have blood tests done every 6 months. Potential side effects including GI upset, renal disease, osteoporosis, and worsening lipid profile were explained as well. We also discussed that prep would not protect her against other STDs.     In addition, we discussed today that she may need re-vaccination for Hepatitis B. We will check the antibody levels for Hepatitis B and re-vaccinate afterwards if needed.     - Immunization status    Most Recent Immunizations   Administered Date(s) Administered    COVID-19 Vaccine (Unruly) 01/07/2022    Hepatitis B, Adult 02/23/2009    TDAP (Adacel,Boostrix) 10/03/2022    TDAP Vaccine (Adacel) 03/01/2012        Face to face  time 25 mins. Total time including chart review, care-coordination and documentation time on the date of encounter - 45 mins     -------------------------------------------------------------------------------------------------------------------    Reason for consult / Chief complaint:   Consulted by Dr. Kelly Buckner for Prep for HIV.     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     Ms. Key reports that she has not been sexually active for the last year but would like to resume her sexual activity. She is  since 2003 and her  is the only expected partner. She thinks that her  has other partners, hence, would like to initiate PREP for her protection against HIV.     She reports no significant medical issues except hypothyroidism, her sexual history includes only men, less than 10 during her lifetime, only 1 partner at a time. She does not regularly use barrier protection, especially with her  who prefers not to use condoms. She has a history of Chlamydia 3-4 years ago but no other STDs. She would like to be tested for STDs today. She has no PCP.       Patient Active Problem List   Diagnosis    Hypothyroidism following radioiodine therapy    Class 1 obesity due to excess calories without serious comorbidity with body mass index (BMI) of 31.0 to 31.9 in adult    Elevated blood pressure reading without diagnosis of hypertension    History of Graves' disease    Chronic insomnia    Anxiety       Allergies:   No Known Allergies      Medications:  Current Outpatient Medications   Medication Sig Dispense Refill    ACETAMINOPHEN EXTRA STRENGTH 500 MG tablet Take 1,200 mg by mouth every 6 hours as needed      Black Cohosh-SoyIsoflav-C Quad (ESTROVEN MENOPAUSE & WEIGHT) 40- MG CAPS Take 1 capsule by mouth every  morning 60 capsule 1    ferrous gluconate (FERGON) 324 (38 Fe) MG tablet Take 324 mg by mouth daily (with breakfast)      hydrocortisone, Perianal, (HYDROCORTISONE) 2.5 % cream Place rectally 2 times daily as needed for hemorrhoids 28 g 2    levothyroxine (SYNTHROID/LEVOTHROID) 112 MCG tablet Take 1 tablet (112 mcg) by mouth daily 90 tablet 1    Multiple Vitamins-Iron (ONE-TABLET-DAILY/IRON PO)       tiZANidine (ZANAFLEX) 2 MG tablet Take 1 tablet (2 mg) by mouth 3 times daily 20 tablet 0    traZODone (DESYREL) 50 MG tablet Take 1 tablet (50 mg) by mouth at bedtime 90 tablet 1         Immunizations:  Immunization History   Administered Date(s) Administered    COVID-19 Vaccine (Unruly) 01/07/2022    Hepatitis B, Adult 02/23/2009    TDAP (Adacel,Boostrix) 10/03/2022    TDAP Vaccine (Adacel) 03/01/2012         Examination:  Vital signs:   /84   Pulse 89   Temp 98.4  F (36.9  C) (Oral)   Wt 79.5 kg (175 lb 3.2 oz)   LMP 06/07/2018   SpO2 99%   BMI 27.04 kg/m      Constitutional: Patient in no distress  Eyes: not pale, not jaundiced  Neck: no lymphadenopathy  CVS: no added sounds  RS: clear  Abdomen: soft, BS+  Skin: no rash  Extremities: no pedal edema  Psych: Alert and oriented x 3      Laboratory:  Hematology:  Recent Labs   Lab Test 06/13/24  0742 09/20/23  1613 09/19/22  1549 08/11/22  1056 12/26/18  0830 06/26/18  1822 04/05/18  1837 08/15/16  1151   WBC 7.0 8.2 7.0 5.7  --   --  7.2 4.4   ANEU  --   --   --   --   --   --  4.3  --    ALYM  --   --   --   --   --   --  1.9  --    TERRY  --   --   --   --   --   --  0.9  --    AEOS  --   --   --   --   --   --  0.1  --    HGB 11.9 12.1 9.5* 12.6 12.3 10.5* 9.2* 11.2*   HCT 37.1 37.4 30.0* 38.4  --   --  29.7* 33.9*    308 516* 276  --   --  334 257       Chemistry:  Last Comprehensive Metabolic Panel:  Lab Results   Component Value Date     06/13/2024    POTASSIUM 3.8 06/13/2024    CHLORIDE 105 06/13/2024    CO2 24 06/13/2024    ANIONGAP 11  06/13/2024     (H) 06/13/2024    BUN 8.7 06/13/2024    CR 0.75 06/13/2024    GFRESTIMATED >90 06/13/2024    SAMPSON 9.5 06/13/2024       Liver Function Studies -   Recent Labs   Lab Test 06/13/24  0742   PROTTOTAL 7.6   ALBUMIN 4.4   BILITOTAL 1.1   ALKPHOS 47   AST 18   ALT 15

## 2024-07-06 LAB
C TRACH DNA SPEC QL PROBE+SIG AMP: NEGATIVE
N GONORRHOEA DNA SPEC QL NAA+PROBE: NEGATIVE
T PALLIDUM AB SER QL: REACTIVE

## 2024-07-08 LAB — RPR SER QL: NONREACTIVE

## 2024-07-10 LAB — T PALLIDUM AB SER QL AGGL: REACTIVE

## 2024-08-06 ENCOUNTER — VIRTUAL VISIT (OUTPATIENT)
Dept: FAMILY MEDICINE | Facility: CLINIC | Age: 54
End: 2024-08-06
Payer: COMMERCIAL

## 2024-08-06 ENCOUNTER — MYC MEDICAL ADVICE (OUTPATIENT)
Dept: FAMILY MEDICINE | Facility: CLINIC | Age: 54
End: 2024-08-06

## 2024-08-06 DIAGNOSIS — R53.83 LACK OF ENERGY: ICD-10-CM

## 2024-08-06 DIAGNOSIS — R63.4 WEIGHT LOSS: Primary | ICD-10-CM

## 2024-08-06 PROCEDURE — 99214 OFFICE O/P EST MOD 30 MIN: CPT | Mod: 95 | Performed by: PHYSICIAN ASSISTANT

## 2024-08-06 PROCEDURE — G2211 COMPLEX E/M VISIT ADD ON: HCPCS | Mod: 95 | Performed by: PHYSICIAN ASSISTANT

## 2024-08-06 RX ORDER — MIRTAZAPINE 15 MG/1
15 TABLET, FILM COATED ORAL AT BEDTIME
Qty: 30 TABLET | Refills: 1 | Status: SHIPPED | OUTPATIENT
Start: 2024-08-06 | End: 2024-09-25

## 2024-08-06 ASSESSMENT — PATIENT HEALTH QUESTIONNAIRE - PHQ9
10. IF YOU CHECKED OFF ANY PROBLEMS, HOW DIFFICULT HAVE THESE PROBLEMS MADE IT FOR YOU TO DO YOUR WORK, TAKE CARE OF THINGS AT HOME, OR GET ALONG WITH OTHER PEOPLE: EXTREMELY DIFFICULT
SUM OF ALL RESPONSES TO PHQ QUESTIONS 1-9: 19
5. POOR APPETITE OR OVEREATING: NEARLY EVERY DAY
SUM OF ALL RESPONSES TO PHQ QUESTIONS 1-9: 19

## 2024-08-06 ASSESSMENT — ANXIETY QUESTIONNAIRES
GAD7 TOTAL SCORE: 18
2. NOT BEING ABLE TO STOP OR CONTROL WORRYING: NEARLY EVERY DAY
7. FEELING AFRAID AS IF SOMETHING AWFUL MIGHT HAPPEN: MORE THAN HALF THE DAYS
6. BECOMING EASILY ANNOYED OR IRRITABLE: NEARLY EVERY DAY
3. WORRYING TOO MUCH ABOUT DIFFERENT THINGS: NEARLY EVERY DAY
5. BEING SO RESTLESS THAT IT IS HARD TO SIT STILL: SEVERAL DAYS
GAD7 TOTAL SCORE: 18
1. FEELING NERVOUS, ANXIOUS, OR ON EDGE: NEARLY EVERY DAY
IF YOU CHECKED OFF ANY PROBLEMS ON THIS QUESTIONNAIRE, HOW DIFFICULT HAVE THESE PROBLEMS MADE IT FOR YOU TO DO YOUR WORK, TAKE CARE OF THINGS AT HOME, OR GET ALONG WITH OTHER PEOPLE: SOMEWHAT DIFFICULT

## 2024-08-06 NOTE — PROGRESS NOTES
Liane is a 54 year old who is being evaluated via a billable video visit.    How would you like to obtain your AVS? MyChart  If the video visit is dropped, the invitation should be resent by: Text to cell phone: [phone]  Will anyone else be joining your video visit? No      Assessment & Plan     Weight loss  Will try mirtazapine, may help with appetite.  Pt asked to stop trazodone for now.  May help with stress as well.  If not improving consider cardiac workup and or imaging and or trial of omeprazole.      - mirtazapine (REMERON) 15 MG tablet; Take 1 tablet (15 mg) by mouth at bedtime    Lack of energy  As above  - mirtazapine (REMERON) 15 MG tablet; Take 1 tablet (15 mg) by mouth at bedtime    The longitudinal plan of care for the diagnosis(es)/condition(s) as documented were addressed during this visit. Due to the added complexity in care, I will continue to support Liane in the subsequent management and with ongoing continuity of care.              Subjective   Liane is a 54 year old, presenting for the following health issues:  Weight Problem        8/6/2024    10:39 AM   Additional Questions   Roomed by Jessica   Accompanied by self     Video Start Time: 11:07 AM    History of Present Illness       Reason for visit:  Eating problem    She eats 0-1 servings of fruits and vegetables daily.She consumes 0 sweetened beverage(s) daily.She exercises with enough effort to increase her heart rate 9 or less minutes per day.  She exercises with enough effort to increase her heart rate 3 or less days per week.   She is taking medications regularly.       Eating issue wants to discuss,decreased appetite   Has not had an appetite for about 6 months.  Losing weight, has decreased in strength and endurance.  Labs in recent visit normal.  She states she is stressed about her body but doesn't feel stressed for other reasons.    Not trying to lose weight.  Gets nausea with eating and the smell is bothersome.    Normal  urination and has a bm daily.    Normal colonoscopy 2 years ago.  No GERD symptoms   Feeling light headed a lot.                    Objective           Vitals:  No vitals were obtained today due to virtual visit.    Physical Exam   GENERAL: alert and no distress  EYES: Eyes grossly normal to inspection.  No discharge or erythema, or obvious scleral/conjunctival abnormalities.  RESP: No audible wheeze, cough, or visible cyanosis.    SKIN: Visible skin clear. No significant rash, abnormal pigmentation or lesions.  NEURO: Cranial nerves grossly intact.  Mentation and speech appropriate for age.  PSYCH: Appropriate affect, tone, and pace of words          Video-Visit Details    Type of service:  Video Visit   Video End Time:11:22 AM  Originating Location (pt. Location): Other work    Distant Location (provider location):  Off-site  Platform used for Video Visit: Suzanne  Signed Electronically by: Kisha Perera PA-C

## 2024-08-07 NOTE — TELEPHONE ENCOUNTER
RN replied to patient via [employer]. See message for details.     Rigoberto Hoover RN, BSN, PHN  [facility]: [place]

## 2024-09-06 ENCOUNTER — OFFICE VISIT (OUTPATIENT)
Dept: INFECTIOUS DISEASES | Facility: CLINIC | Age: 54
End: 2024-09-06
Attending: STUDENT IN AN ORGANIZED HEALTH CARE EDUCATION/TRAINING PROGRAM
Payer: COMMERCIAL

## 2024-09-06 ENCOUNTER — LAB (OUTPATIENT)
Dept: LAB | Facility: CLINIC | Age: 54
End: 2024-09-06
Payer: COMMERCIAL

## 2024-09-06 VITALS
OXYGEN SATURATION: 93 % | TEMPERATURE: 98 F | DIASTOLIC BLOOD PRESSURE: 98 MMHG | WEIGHT: 184.5 LBS | HEART RATE: 51 BPM | HEIGHT: 67 IN | SYSTOLIC BLOOD PRESSURE: 157 MMHG | BODY MASS INDEX: 28.96 KG/M2

## 2024-09-06 DIAGNOSIS — Z71.1 CONCERN ABOUT STD IN FEMALE WITHOUT DIAGNOSIS: ICD-10-CM

## 2024-09-06 DIAGNOSIS — Z79.899 ON PRE-EXPOSURE PROPHYLAXIS FOR HIV: Primary | ICD-10-CM

## 2024-09-06 LAB
ALBUMIN SERPL BCG-MCNC: 4.3 G/DL (ref 3.5–5.2)
ALBUMIN UR-MCNC: NEGATIVE MG/DL
ALP SERPL-CCNC: 52 U/L (ref 40–150)
ALT SERPL W P-5'-P-CCNC: 14 U/L (ref 0–50)
ANION GAP SERPL CALCULATED.3IONS-SCNC: 10 MMOL/L (ref 7–15)
APPEARANCE UR: ABNORMAL
AST SERPL W P-5'-P-CCNC: 20 U/L (ref 0–45)
BACTERIA #/AREA URNS HPF: ABNORMAL /HPF
BASOPHILS # BLD AUTO: 0.1 10E3/UL (ref 0–0.2)
BASOPHILS NFR BLD AUTO: 1 %
BILIRUB SERPL-MCNC: 0.9 MG/DL
BILIRUB UR QL STRIP: NEGATIVE
BUN SERPL-MCNC: 10.6 MG/DL (ref 6–20)
CALCIUM SERPL-MCNC: 9.3 MG/DL (ref 8.8–10.4)
CHLORIDE SERPL-SCNC: 105 MMOL/L (ref 98–107)
COLOR UR AUTO: YELLOW
CREAT SERPL-MCNC: 0.76 MG/DL (ref 0.51–0.95)
EGFRCR SERPLBLD CKD-EPI 2021: >90 ML/MIN/1.73M2
EOSINOPHIL # BLD AUTO: 0.1 10E3/UL (ref 0–0.7)
EOSINOPHIL NFR BLD AUTO: 2 %
ERYTHROCYTE [DISTWIDTH] IN BLOOD BY AUTOMATED COUNT: 12.6 % (ref 10–15)
GLUCOSE SERPL-MCNC: 98 MG/DL (ref 70–99)
GLUCOSE UR STRIP-MCNC: NEGATIVE MG/DL
HCO3 SERPL-SCNC: 25 MMOL/L (ref 22–29)
HCT VFR BLD AUTO: 36.5 % (ref 35–47)
HGB BLD-MCNC: 11.7 G/DL (ref 11.7–15.7)
HGB UR QL STRIP: NEGATIVE
IMM GRANULOCYTES # BLD: 0 10E3/UL
IMM GRANULOCYTES NFR BLD: 0 %
KETONES UR STRIP-MCNC: ABNORMAL MG/DL
LEUKOCYTE ESTERASE UR QL STRIP: NEGATIVE
LYMPHOCYTES # BLD AUTO: 2.8 10E3/UL (ref 0.8–5.3)
LYMPHOCYTES NFR BLD AUTO: 38 %
MCH RBC QN AUTO: 30.6 PG (ref 26.5–33)
MCHC RBC AUTO-ENTMCNC: 32.1 G/DL (ref 31.5–36.5)
MCV RBC AUTO: 96 FL (ref 78–100)
MONOCYTES # BLD AUTO: 0.6 10E3/UL (ref 0–1.3)
MONOCYTES NFR BLD AUTO: 9 %
NEUTROPHILS # BLD AUTO: 3.7 10E3/UL (ref 1.6–8.3)
NEUTROPHILS NFR BLD AUTO: 50 %
NITRATE UR QL: NEGATIVE
NRBC # BLD AUTO: 0 10E3/UL
NRBC BLD AUTO-RTO: 0 /100
PH UR STRIP: 6.5 [PH] (ref 5–7)
PLATELET # BLD AUTO: 287 10E3/UL (ref 150–450)
POTASSIUM SERPL-SCNC: 3.6 MMOL/L (ref 3.4–5.3)
PROT SERPL-MCNC: 7.5 G/DL (ref 6.4–8.3)
RBC # BLD AUTO: 3.82 10E6/UL (ref 3.8–5.2)
RBC URINE: 0 /HPF
SODIUM SERPL-SCNC: 140 MMOL/L (ref 135–145)
SP GR UR STRIP: 1.02 (ref 1–1.03)
SQUAMOUS EPITHELIAL: 10 /HPF
UROBILINOGEN UR STRIP-MCNC: NORMAL MG/DL
WBC # BLD AUTO: 7.4 10E3/UL (ref 4–11)
WBC URINE: 1 /HPF

## 2024-09-06 PROCEDURE — 99000 SPECIMEN HANDLING OFFICE-LAB: CPT | Performed by: PATHOLOGY

## 2024-09-06 PROCEDURE — 87536 HIV-1 QUANT&REVRSE TRNSCRPJ: CPT | Performed by: STUDENT IN AN ORGANIZED HEALTH CARE EDUCATION/TRAINING PROGRAM

## 2024-09-06 PROCEDURE — 36415 COLL VENOUS BLD VENIPUNCTURE: CPT | Performed by: PATHOLOGY

## 2024-09-06 PROCEDURE — 80053 COMPREHEN METABOLIC PANEL: CPT | Performed by: PATHOLOGY

## 2024-09-06 PROCEDURE — 99213 OFFICE O/P EST LOW 20 MIN: CPT | Performed by: STUDENT IN AN ORGANIZED HEALTH CARE EDUCATION/TRAINING PROGRAM

## 2024-09-06 PROCEDURE — 85025 COMPLETE CBC W/AUTO DIFF WBC: CPT | Performed by: PATHOLOGY

## 2024-09-06 PROCEDURE — 81001 URINALYSIS AUTO W/SCOPE: CPT | Performed by: STUDENT IN AN ORGANIZED HEALTH CARE EDUCATION/TRAINING PROGRAM

## 2024-09-06 ASSESSMENT — PAIN SCALES - GENERAL: PAINLEVEL: NO PAIN (0)

## 2024-09-06 NOTE — PROGRESS NOTES
[facility] INFECTIOUS DISEASE CLINIC [address] [address]  Phone: [phone]  Fax: [phone]    Patient:  Liane Key, Date of birth 1970  Date of Visit:  09/06/2024  Referring Provider Kelly Buckner  Reason for visit: 2mo follow-up PrEP       Staff Attestation    I saw Liane Key in the ID clinic on September 6, 2024, with the resident Dr. Delgado. I have examined the patient and reviewed the history, vital signs, medications, labs and imaging. Assessment and plan were jointly made. I agree with and have edited the note and plan of care.     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     HIV PrEP  She is tolerating Truvada well, has not been sexually active since starting PrEP. Lab work shows immunity to HepB and negative gonorrhea, chlamydia, and HIV. See below for discussion of Syphilis. She understands the importance of taking Truvada daily without missing doses. Discussed that it does not cover other STIs. She is hepatitis B immune.   - CMP, CBC, urinalysis, HIV-1 RNA  - Follow-up in 6 months for repeat labs    Hx Syphilis  Treponema Ab positive, RPR nonreactive, TP-PA reactive suggesting past infection. Records show she received 3 penicillin G benzathine injections in 2015. She does not need further treatment for syphilis at this time.    Patient seen with Dr. Frederick Delgado -  PGY 2    ------------------------------------------------------------------------------------------------------------------------------------------------------------------------------------    History of Present Illness   Pertinent history obtain from: patient  Last seen 7/5/24. At that time  "started truvada for prep.   She has been taking Truvada daily for the last 2 months. She is tolerating the medication well, no GI upset. She has not been sexually active since starting prep.      Physical Exam   Vital signs:  BP (!) 157/98 (BP Location: Right arm, Cuff Size: Adult Regular)   Pulse 51   Temp 98  F (36.7  C) (Oral)   Ht 1.702 m (5' 7\")   Wt 83.7 kg (184 lb 8 oz)   LMP 06/07/2018   SpO2 93%   BMI 28.90 kg/m      GENERAL: alert and no distress  RESP: breathing comfortably on room air  MS: no gross musculoskeletal defects noted, no edema  PSYCH: appropriate    Data   Laboratory data and imaging listed below was reviewed prior to this encounter.     7/5/24  Treponema Ab positive, RPR nonreactive, TP-PA reactive   Negative gonorrhea, chlamydia, and HIV  Hep B Surface Ag nonreactive, Hep B Surface Ab reactive           ----- Service Performed and Documented by Resident or Fellow ------         "

## 2024-09-06 NOTE — NURSING NOTE
"Chief Complaint   Patient presents with    RECHECK     2 Month follow up      BP (!) 157/98 (BP Location: Right arm, Cuff Size: Adult Regular)   Pulse 51   Temp 98  F (36.7  C) (Oral)   Ht 1.702 m (5' 7\")   Wt 83.7 kg (184 lb 8 oz)   LMP 06/07/2018   SpO2 93%   BMI 28.90 kg/m    Erinn Dumont, CMA on 9/6/2024 at 3:23 PM    "

## 2024-09-06 NOTE — LETTER
9/6/2024       RE: Liane Key  [address] [place] [address]     Dear Colleague,    Thank you for referring your patient, Liane Key, to the [facility] INFECTIOUS DISEASE CLINIC [place] at [facility]. Please see a copy of my visit note below.      [facility] INFECTIOUS DISEASE CLINIC [place]  [address] [address]  Phone: [phone]  Fax: [phone]    Patient:  Liane Key, Date of birth 1970  Date of Visit:  09/06/2024  Referring Provider Kelly Buckner  Reason for visit: 2mo follow-up PrEP       Staff Attestation    I saw Liane Key in the ID clinic on September 6, 2024, with the resident Dr. Delgado. I have examined the patient and reviewed the history, vital signs, medications, labs and imaging. Assessment and plan were jointly made. I agree with and have edited the note and plan of care.     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     HIV PrEP  She is tolerating Truvada well, has not been sexually active since starting PrEP. Lab work shows immunity to HepB and negative gonorrhea, chlamydia, and HIV. See below for discussion of Syphilis. She understands the importance of taking Truvada daily without missing doses. Discussed that it does not cover other STIs. She is hepatitis B immune.   - CMP, CBC, urinalysis, HIV-1 RNA  - Follow-up in 6 months for repeat labs    Hx Syphilis  Treponema Ab positive, RPR nonreactive, TP-PA reactive suggesting past infection. Records show she received 3 penicillin G benzathine injections in 2015. She does not need further treatment for syphilis at this time.    Patient seen with   "Frederick Delgado - IM PGY 2    ------------------------------------------------------------------------------------------------------------------------------------------------------------------------------------    History of Present Illness  Pertinent history obtain from: patient  Last seen 7/5/24. At that time started truvada for prep.   She has been taking Truvada daily for the last 2 months. She is tolerating the medication well, no GI upset. She has not been sexually active since starting prep.      Physical Exam  Vital signs:  BP (!) 157/98 (BP Location: Right arm, Cuff Size: Adult Regular)   Pulse 51   Temp 98  F (36.7  C) (Oral)   Ht 1.702 m (5' 7\")   Wt 83.7 kg (184 lb 8 oz)   LMP 06/07/2018   SpO2 93%   BMI 28.90 kg/m      GENERAL: alert and no distress  RESP: breathing comfortably on room air  MS: no gross musculoskeletal defects noted, no edema  PSYCH: appropriate    Data  Laboratory data and imaging listed below was reviewed prior to this encounter.     7/5/24  Treponema Ab positive, RPR nonreactive, TP-PA reactive   Negative gonorrhea, chlamydia, and HIV  Hep B Surface Ag nonreactive, Hep B Surface Ab reactive           ----- Service Performed and Documented by Resident or Fellow ------           Again, thank you for allowing me to participate in the care of your patient.      Sincerely,    Kaia Mack MD    "

## 2024-09-07 LAB — HIV1 RNA # PLAS NAA DL=20: NOT DETECTED COPIES/ML

## 2024-09-25 ENCOUNTER — OFFICE VISIT (OUTPATIENT)
Dept: FAMILY MEDICINE | Facility: CLINIC | Age: 54
End: 2024-09-25
Payer: COMMERCIAL

## 2024-09-25 VITALS
RESPIRATION RATE: 19 BRPM | SYSTOLIC BLOOD PRESSURE: 132 MMHG | DIASTOLIC BLOOD PRESSURE: 82 MMHG | HEART RATE: 78 BPM | HEIGHT: 67 IN | WEIGHT: 188 LBS | OXYGEN SATURATION: 100 % | TEMPERATURE: 97.6 F | BODY MASS INDEX: 29.51 KG/M2

## 2024-09-25 DIAGNOSIS — F41.9 ANXIETY: ICD-10-CM

## 2024-09-25 DIAGNOSIS — R19.8 FULLNESS OF ABDOMEN: ICD-10-CM

## 2024-09-25 DIAGNOSIS — F51.04 CHRONIC INSOMNIA: ICD-10-CM

## 2024-09-25 DIAGNOSIS — R14.0 ABDOMINAL BLOATING: Primary | ICD-10-CM

## 2024-09-25 DIAGNOSIS — R53.83 LACK OF ENERGY: ICD-10-CM

## 2024-09-25 DIAGNOSIS — R63.4 WEIGHT LOSS: ICD-10-CM

## 2024-09-25 PROCEDURE — G2211 COMPLEX E/M VISIT ADD ON: HCPCS | Performed by: PHYSICIAN ASSISTANT

## 2024-09-25 PROCEDURE — 99214 OFFICE O/P EST MOD 30 MIN: CPT | Performed by: PHYSICIAN ASSISTANT

## 2024-09-25 RX ORDER — MIRTAZAPINE 15 MG/1
15 TABLET, FILM COATED ORAL AT BEDTIME
Qty: 90 TABLET | Refills: 1 | Status: SHIPPED | OUTPATIENT
Start: 2024-09-25

## 2024-09-25 RX ORDER — ESCITALOPRAM OXALATE 5 MG/1
5 TABLET ORAL DAILY
Qty: 30 TABLET | Refills: 1 | Status: SHIPPED | OUTPATIENT
Start: 2024-09-25

## 2024-09-25 ASSESSMENT — ANXIETY QUESTIONNAIRES
7. FEELING AFRAID AS IF SOMETHING AWFUL MIGHT HAPPEN: SEVERAL DAYS
7. FEELING AFRAID AS IF SOMETHING AWFUL MIGHT HAPPEN: SEVERAL DAYS
2. NOT BEING ABLE TO STOP OR CONTROL WORRYING: NEARLY EVERY DAY
IF YOU CHECKED OFF ANY PROBLEMS ON THIS QUESTIONNAIRE, HOW DIFFICULT HAVE THESE PROBLEMS MADE IT FOR YOU TO DO YOUR WORK, TAKE CARE OF THINGS AT HOME, OR GET ALONG WITH OTHER PEOPLE: SOMEWHAT DIFFICULT
5. BEING SO RESTLESS THAT IT IS HARD TO SIT STILL: SEVERAL DAYS
GAD7 TOTAL SCORE: 17
GAD7 TOTAL SCORE: 17
1. FEELING NERVOUS, ANXIOUS, OR ON EDGE: NEARLY EVERY DAY
3. WORRYING TOO MUCH ABOUT DIFFERENT THINGS: NEARLY EVERY DAY
6. BECOMING EASILY ANNOYED OR IRRITABLE: NEARLY EVERY DAY
8. IF YOU CHECKED OFF ANY PROBLEMS, HOW DIFFICULT HAVE THESE MADE IT FOR YOU TO DO YOUR WORK, TAKE CARE OF THINGS AT HOME, OR GET ALONG WITH OTHER PEOPLE?: SOMEWHAT DIFFICULT
GAD7 TOTAL SCORE: 17
GAD7 TOTAL SCORE: 17

## 2024-09-25 ASSESSMENT — PATIENT HEALTH QUESTIONNAIRE - PHQ9
5. POOR APPETITE OR OVEREATING: NEARLY EVERY DAY
SUM OF ALL RESPONSES TO PHQ QUESTIONS 1-9: 21

## 2024-09-25 NOTE — PROGRESS NOTES
Assessment & Plan     Abdominal bloating  IBS vs anxiety vs other.  Has had imaging and labs that have not provided any specific answer.  Referral to GI.    - Adult GI  Referral - Consult Only; Future    Anxiety  Discussed this contributing to appetite and stomach symptoms.  Pt open to starting med.  informed that results may not be seen until 6 weeks. Told if having side effects to follow up   - escitalopram (LEXAPRO) 5 MG tablet; Take 1 tablet (5 mg) by mouth daily.      (R19.8) Fullness of abdomen  Comment:   Plan: Adult GI  Referral - Consult Only        As above      (R63.4) Weight loss  Comment:   Plan: mirtazapine (REMERON) 15 MG tablet        As above.  Improved with mirtazapine -continue     (R53.83) Lack of energy  Comment:   Plan: mirtazapine (REMERON) 15 MG tablet        As above    (F51.04) Chronic insomnia  Comment:   Plan: as above    The longitudinal plan of care for the diagnosis(es)/condition(s) as documented were addressed during this visit. Due to the added complexity in care, I will continue to support Liane in the subsequent management and with ongoing continuity of care.    Weight management plan: no weight loss needed currently           Subjective   Liane is a 54 year old, presenting for the following health issues:  Weight Loss      9/25/2024    11:05 AM   Additional Questions   Roomed by Jessica   Accompanied by self     History of Present Illness       Mental Health Follow-up:  Patient presents to follow-up on Depression & Anxiety.Patient's depression since last visit has been:  Medium  The patient is having other symptoms associated with depression.  Patient's anxiety since last visit has been:  Medium  The patient is having other symptoms associated with anxiety.  Any significant life events: relationship concerns, job concerns, financial concerns and health concerns  Patient is feeling anxious or having panic attacks.  Patient has no concerns about alcohol or drug  use.    Reason for visit:  Weight and stomach problems    She eats 2-3 servings of fruits and vegetables daily.She consumes 0 sweetened beverage(s) daily.She exercises with enough effort to increase her heart rate 30 to 60 minutes per day.  She exercises with enough effort to increase her heart rate 6 days per week.   She is taking medications regularly.       44 y/o F presents to clinic for stomach problems x 2 yrs. Pt sts she's had abd bloating since her surgery for abdominoplasty. Right after surgery had fluid collection and that was removed.  Occasional constipation and diarrhea. No major weight changes or f/n/v. Diet is overall well balanced. No abp pain. No blood in stool. Had a significant decrease in appetite recently too that really stressed her out but adding mirtazapine helped.  Still having trouble sleeping due to anxious thoughts.           Pain History:  When did you first notice your pain? 1 year on and off   Have you seen anyone else for your pain? Yes  ow has your pain affected your ability to work? 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 Abdominal/Flank Pain  Onset/Duration: 1 year of and on   Description:   Character: Dull ache  Location: emma-umbilical region  Radiation: None  Intensity: 5/10  Progression of Symptoms:  same  Accompanying Signs & Symptoms:  Fever/Chills: No  Gas/Bloating: YES  Nausea no   Vomitting: no   Diarrhea: sometimes   Constipation: sometimes   Dysuria or Hematuria: No  History:   Trauma: No  Previous similar pain: No  Previous tests done: none  Precipitating factors:   Does the pain change with:     Food: No    Bowel Movement: YES    Urination: No   Other factors:  No  Therapies tried and outcome: None  Patient's last menstrual period was 06/07/2018.              General: no f/n/v, no major weight changes -had weight loss but is gaining back now   HEENT: no h/a, no  "dizziness, no vision changes    GI: no abd pain, stomach bloating x 2 yr, gassy, no consistent constipation or diarrhea, no blood in stool    RESP: no sob   CVS: no cp or palpations, no edema    PSYCH: anxious         Objective    /82   Pulse 78   Temp 97.6  F (36.4  C) (Temporal)   Resp 19   Ht 1.702 m (5' 7\")   Wt 85.3 kg (188 lb)   LMP 06/07/2018   SpO2 100%   BMI 29.44 kg/m    Body mass index is 29.44 kg/m .  Physical Exam   GENERAL: alert and no distress  NECK: no adenopathy, no asymmetry, masses, or scars  RESP: lungs clear to auscultation - no rales, rhonchi or wheezes  CV: regular rate and rhythm, normal S1 S2, no S3 or S4, no murmur, click or rub, no peripheral edema  ABDOMEN: soft, nontender, no hepatosplenomegaly, no masses and bowel sounds normal  SKIN: no suspicious lesions or rashes              Signed Electronically by: LEEANNA Mcgowan  -- Services Performed by a physician assistant student in Presence of a certified physician assistant-------    Signed Electronically by: Kisha Perera PA-C    "

## 2024-09-26 ENCOUNTER — TELEPHONE (OUTPATIENT)
Dept: GASTROENTEROLOGY | Facility: CLINIC | Age: 54
End: 2024-09-26

## 2024-09-26 NOTE — TELEPHONE ENCOUNTER
[facility] Call Center    Phone Message    May a detailed message be left on voicemail: Yes    Reason for Call: Other: Patient is currently scheduled on 12/, as visit type New GI Urgent. This is outside the expected timeline for this referral. Patient has been added to the waitlist.      Action Taken: Message routed to:  Other: GI REFERRAL TRIAGE POOL     Travel Screening: Not Applicable

## 2024-10-03 NOTE — TELEPHONE ENCOUNTER
Clinic Navigator sent a [employer] message to inform the Pt that Pt is on the wait list for a sooner appointment. Clinic Navigator will reach out to the Pt via phone call or [employer] when a sooner appointment becomes available.

## 2024-10-09 ENCOUNTER — TELEPHONE (OUTPATIENT)
Dept: INTERNAL MEDICINE | Facility: CLINIC | Age: 54
End: 2024-10-09
Payer: COMMERCIAL

## 2024-10-09 NOTE — TELEPHONE ENCOUNTER
Patient Quality Outreach    Patient is due for the following:   Breast Cancer Screening - Mammogram  Physical Preventive Adult Physical,  - mammogram    Next Steps:   Schedule a Adult Preventative    Type of outreach:    Sent [employer] message.    Next Steps:  Reach out within 90 days via [employer].    Max number of attempts reached: Yes. Will try again in 90 days if patient still on fail list.    Questions for provider review:    None           Serena Schwarz, [facility]  Chart routed to closing chart  .

## 2024-11-04 NOTE — TELEPHONE ENCOUNTER
Patient is now scheduled within the appropriate time frame of one month for urgent triaged referrals. No rescheduling is needed anymore.    Scheduled with Gastroenterology (Negrita Medina DO)  12/04/2024 at 4:00 PM     Closing encounter.      Ashlyn Gamez, [facility]

## 2024-11-06 DIAGNOSIS — E89.0 HYPOTHYROIDISM FOLLOWING RADIOIODINE THERAPY: Chronic | ICD-10-CM

## 2024-11-08 RX ORDER — LEVOTHYROXINE SODIUM 112 UG/1
112 TABLET ORAL DAILY
Qty: 90 TABLET | Refills: 0 | Status: SHIPPED | OUTPATIENT
Start: 2024-11-08

## 2024-11-09 ENCOUNTER — HEALTH MAINTENANCE LETTER (OUTPATIENT)
Age: 54
End: 2024-11-09

## 2024-12-04 ENCOUNTER — VIRTUAL VISIT (OUTPATIENT)
Dept: GASTROENTEROLOGY | Facility: CLINIC | Age: 54
End: 2024-12-04
Attending: PHYSICIAN ASSISTANT
Payer: COMMERCIAL

## 2024-12-04 DIAGNOSIS — R63.4 UNINTENTIONAL WEIGHT LOSS: ICD-10-CM

## 2024-12-04 DIAGNOSIS — R19.8 FULLNESS OF ABDOMEN: Primary | ICD-10-CM

## 2024-12-04 DIAGNOSIS — R14.0 ABDOMINAL BLOATING: ICD-10-CM

## 2024-12-04 NOTE — PROGRESS NOTES
HPI:    Liane  presents today for a video visit to discuss bloating which has been going on since her tummy tuck 2 years ago. This was complicated by a post operative seroma and she feels like this has never resolved. Has also been struggling with intermittent constipation since although had previously never had any problems.  Will sometimes have a bowel movement daily - other times will take a few days to have a BM - is managing this with a protein shake for breakfast which helps keep her stools soft and is now generally having a daily bowel movement.  Did lose 25lbs unintentionally a few months ago - this has improved and has started gaining weight back after starting mirtazapine.        Past Medical History:   Diagnosis Date    Class 1 obesity due to excess calories without serious comorbidity with body mass index (BMI) of 31.0 to 31.9 in adult     Graves disease     Hypothyroidism following radioiodine therapy     Obesity     Syphilis 05/08/2015       Past Surgical History:   Procedure Laterality Date    CHOLECYSTECTOMY  1998         COLONOSCOPY WITH CO2 INSUFFLATION N/A 11/28/2022    Procedure: COLONOSCOPY, WITH CO2 INSUFFLATION;  Surgeon: Negrita Medina DO;  Location: [facility] OR    HYSTERECTOMY, PAP NO LONGER INDICATED  07/13/2018    LAPAROSCOPIC ASSISTED HYSTERECTOMY VAGINAL  07/13/2018    adenomyosis, fibroids     OPEN REDUCTION INTERNAL FIXATION ANKLE Left 1990         TUBAL LIGATION  1990       Family History   Problem Relation Age of Onset    Unknown/Adopted Maternal Grandmother     Unknown/Adopted Maternal Grandfather     Unknown/Adopted Paternal Grandmother     Unknown/Adopted Paternal Grandfather     Depression Sister     Thyroid Disease Maternal Aunt     Cancer No family hx of     Diabetes No family hx of     Hypertension No family hx of     Cerebrovascular Disease No family hx of     Glaucoma No family hx of     Macular Degeneration No family hx of        Social History     Tobacco Use     Smoking status: Former     Current packs/day: 0.00     Average packs/day: 0.2 packs/day for 5.0 years (1.0 ttl pk-yrs)     Types: Cigarettes     Start date: 2017     Quit date: 2022     Years since quittin.3     Passive exposure: Past    Smokeless tobacco: Never   Substance Use Topics    Alcohol use: Not Currently     Comment: nightly one        O:    Gen: no acute distress  HEENT: NCAT  Neck: normal ROM  Resp: nonlabored breathing  Neuro: no gross deficits  Psych: appropriate mood and affect    Assessment and Plan:    # bloating, unintentional weight loss, constipation (improving) - will get CT chest/abdomen/pelvis given unintentional weight loss. For now - continue current bowel regimen as it seems to be helping. Has been gaining weight back since starting remeron. If CT unrevealing and doing well can likely hold off on further evaluation - if recurs - consider EGD    RTC after testing as needed    Negrita Meidna DO     Video-Visit Details     Type of service:  Video Visit     Video Start Time: 4:01 PM  Video End Time (time video stopped):   Distant Location (provider location): remote     Mode of Communication:  Video Conference via [employer]

## 2024-12-04 NOTE — PATIENT INSTRUCTIONS
It was a pleasure meeting with you today and discussing your healthcare plan. Below is a summary of what we covered:    Please have a CT scan done  You can use miralax as needed for constipation      Please see below for any additional questions and scheduling guidelines.    Sign up for Pin or Peg: Pin or Peg patient portal serves as a secure platform for accessing your medical records from the [facility]. Additionally, Pin or Peg facilitates easy, timely, and secure messaging with your care team. If you have not signed up, you may do so by using the provided code or calling [phone].    Coordinating your care after your visit:  There are multiple options for scheduling your follow-up care based on your provider's recommendation.    How do I schedule a follow-up clinic appointment:   After your appointment, you may receive scheduling assistance with the Clinic Coordinators by having a seat in the waiting room and a Clinic Coordinator will call you up to schedule.  Virtual visits or after you leave the clinic:  Your provider has placed a follow-up order in the Pin [employer] portal for scheduling your return appointment. A member of the scheduling team will contact you to schedule.  Pin or Peg Scheduling: Timely scheduling through Pin [employer] is advised to ensure appointment availability.   Call to schedule: You may schedule your follow-up appointment(s) by calling [phone], option 1.    How do I schedule my endoscopy or colonoscopy procedure:  If a procedure, such as a colonoscopy or upper endoscopy was ordered by your provider, the scheduling team will contact you to schedule this procedure. Or you may choose to call to schedule at   [phone], option 2.  Please allow 20-30 minutes when scheduling a procedure.    How do I get my blood work done? To get your blood work done, you need to schedule a lab appointment at an [facility]. There are multiple ways to schedule:   At the clinic: The Clinic  Coordinator you meet after your visit can help you schedule a lab appointment.   Mint scheduling: Mint offers online lab scheduling at all [facility] laboratory locations.   Call to schedule: You can call [phone] to schedule your lab appointment.    How do I schedule my imaging study: To schedule imaging studies, such as CT scans, ultrasounds, MRIs, or X-rays, contact Imaging Services at [phone].    How do I schedule a referral to another doctor: If your provider recommended a referral to another specialist(s), the referral order was placed by your provider. You will receive a phone call to schedule this referral, or you may choose to call the number attached to the referral to self-schedule.    For Post-Visit Question(s):  For any inquiries following today's visit:  Please utilize Mint messaging and allow 48 hours for reply or contact the Call Center during normal business hours at [phone], option 3.  For Emergent After-hours questions, contact the On-Call GI Fellow through the [facility]  at [phone].  In addition, you may contact your Nurse directly using the provided contact information.    Test Results:  Test results will be accessible via Mint in compliance with the 21st Century Cures Act. This means that your results will be available to you at the same time as your provider. Often you may see your results before your provider does. Results are reviewed by staff within two weeks with communication follow-up. Results may be released in the patient portal prior to your care team review.    Prescription Refill(s):  Medication prescribed by your provider will be addressed during your visit. For future refills, please coordinate with your pharmacy. If you have not had a recent clinic visit or routine labs, for your safety, your provider may not be able to refill your prescription.

## 2024-12-04 NOTE — NURSING NOTE
Current patient location: Patient declined to provide     Is the patient currently in the state of [place]? YES    Visit mode:VIDEO    If the visit is dropped, the patient can be reconnected by:VIDEO VISIT: Text to cell phone:   Telephone Information:   Mobile [phone]       Will anyone else be joining the visit? NO  (If patient encounters technical issues they should call [phone])    Are changes needed to the allergy or medication list? No    Are refills needed on medications prescribed by this physician? NO    Rooming Documentation:  Not applicable    Reason for visit: Consult    Lisa PONCE

## 2024-12-04 NOTE — PROGRESS NOTES
"Virtual Visit Details    Type of service:  Video Visit   Video Start Time: {video visit start/end time for provider to select:496482}  Video End Time:{video visit start/end time for provider to select:362896}    Originating Location (pt. Location): {video visit patient location:594597::\"Home\"}  {PROVIDER LOCATION On-site should be selected for visits conducted from your clinic location or adjoining [facility] hospital, academic office, or other nearby [facility] building. Off-site should be selected for all other provider locations, including home:751[address]}  Distant Location (provider location):  {virtual location provider:259614}  Platform used for Video Visit: {Virtual Visit Platforms:978807::\"Collect\"}    "

## 2024-12-16 DIAGNOSIS — R63.4 UNINTENTIONAL WEIGHT LOSS: Primary | ICD-10-CM

## 2024-12-16 DIAGNOSIS — R14.0 ABDOMINAL BLOATING: ICD-10-CM

## 2024-12-20 ENCOUNTER — ANCILLARY PROCEDURE (OUTPATIENT)
Dept: CT IMAGING | Facility: CLINIC | Age: 54
End: 2024-12-20
Attending: INTERNAL MEDICINE
Payer: COMMERCIAL

## 2024-12-20 DIAGNOSIS — R63.4 UNINTENTIONAL WEIGHT LOSS: ICD-10-CM

## 2024-12-20 DIAGNOSIS — R14.0 ABDOMINAL BLOATING: ICD-10-CM

## 2024-12-20 PROCEDURE — 74177 CT ABD & PELVIS W/CONTRAST: CPT | Mod: TC | Performed by: RADIOLOGY

## 2024-12-20 RX ORDER — IOPAMIDOL 755 MG/ML
115 INJECTION, SOLUTION INTRAVASCULAR ONCE
Status: COMPLETED | OUTPATIENT
Start: 2024-12-20 | End: 2024-12-20

## 2024-12-20 RX ADMIN — IOPAMIDOL 115 ML: 755 INJECTION, SOLUTION INTRAVASCULAR at 15:53

## 2024-12-26 DIAGNOSIS — R14.0 ABDOMINAL BLOATING: Primary | ICD-10-CM

## 2025-01-14 ENCOUNTER — TELEPHONE (OUTPATIENT)
Dept: GASTROENTEROLOGY | Facility: CLINIC | Age: 55
End: 2025-01-14
Payer: COMMERCIAL

## 2025-01-14 NOTE — TELEPHONE ENCOUNTER
"Endoscopy Scheduling Screen    Have you had any respiratory illness or flu-like symptoms in the last 10 days?  No    What is your communication preference for Instructions and/or Bowel Prep?   MyChart    What insurance is in the chart?  Other:  [employer]Medicare    Ordering/Referring Provider: Negrita Medina, DO in [facility]   (If ordering provider performs procedure, schedule with ordering provider unless otherwise instructed. )    BMI: Estimated body mass index is 29.44 kg/m  as calculated from the following:    Height as of 9/25/24: 1.702 m (5' 7\").    Weight as of 9/25/24: 85.3 kg (188 lb).     Sedation Ordered  moderate sedation.   If patient BMI > 50 do not schedule in [facility].    If patient BMI > 45 do not schedule at [facility].    Are you taking methadone or Suboxone?  NO, No RN review required.    Have you been diagnosed and are being treated for severe PTSD or severe anxiety?  NO, No RN review required.    Are you taking any prescription medications for pain 3 or more times per week?   NO, No RN review required.    Do you have a history of malignant hyperthermia?  No    (Females) Are you currently pregnant?   No     Have you been diagnosed or told you have pulmonary hypertension?   No    Do you have an LVAD?  No    Have you been told you have moderate to severe sleep apnea?  No.    Have you been told you have COPD, asthma, or any other lung disease?  No    Do you have any heart conditions?  No     Have you ever had or are you waiting for an organ transplant?  No. Continue scheduling, no site restrictions.    Have you had a stroke or transient ischemic attack (TIA aka \"mini stroke\" in the last 6 months?   No    Have you been diagnosed with or been told you have cirrhosis of the liver?   No.    Are you currently on dialysis?   No    Do you need assistance transferring?   No    BMI: Estimated body mass index is 29.44 kg/m  as calculated from the following:    Height as of 9/25/24: 1.702 m (5' 7\").    Weight as of 9/25/24: " 85.3 kg (188 lb).     Is patients BMI > 40 and scheduling location [facility]?  No    Do you take an injectable or oral medication for weight loss or diabetes (excluding insulin)?  No    Do you take the medication Naltrexone?  No    Do you take blood thinners?  No       Prep   Are you currently on dialysis or do you have chronic kidney disease?  No    Do you have a diagnosis of diabetes?  No    Do you have a diagnosis of cystic fibrosis (CF)?  No    On a regular basis do you go 3 -5 days between bowel movements?  No    BMI > 40?  No    Preferred Pharmacy:    [facility] [place] - [address]  [address]  [address]  [place] [address]  Phone: [phone] Fax: [phone]      Final Scheduling Details     Procedure scheduled  Upper endoscopy (EGD)    Surgeon:  Adam     Date of procedure:  1.28.25     Pre-OP / PAC:   No - Not required for this site.    Location  [facility] - [facility] - Patient preference.    Sedation   Moderate Sedation - Per order.      Patient Reminders:   You will receive a call from a Nurse to review instructions and health history.  This assessment must be completed prior to your procedure.  Failure to complete the Nurse assessment may result in the procedure being cancelled.      On the day of your procedure, please designate an adult(s) who can drive you home stay with you for the next 24 hours. The medicines used in the exam will make you sleepy. You will not be able to drive.      You cannot take public transportation, ride share services, or non-medical taxi service without a responsible caregiver.  Medical transport services are allowed with the requirement that a responsible caregiver will receive you at your destination.  We require that drivers and caregivers are confirmed prior to your procedure.

## 2025-01-14 NOTE — TELEPHONE ENCOUNTER
Pre visit planning completed.      Procedure details:    Patient scheduled for Upper endoscopy (EGD) on 1/28/25.     Approximate arrival time: 1045. Procedure time 1130.   *Ensure patient is aware that endoscopy team will be calling about 2 days prior to procedure date to confirm arrival time as this may change.     Facility location: [facility]; [address], [place] [address]. Check in location: 2nd Floor at Surgery desk.  *Disclaimer: Drivers are to check in with patient and stay on campus during procedure.     Sedation type: Conscious sedation     Pre op exam needed? No.    Indication for procedure:   Abdominal bloating             Chart review:     Electronic implanted devices? No    Recent diagnosis of diverticulitis within the last 6 weeks? No      Medication review:    Diabetic? No    Anticoagulants? No    Weight loss medication/injectable? No GLP-1 medication per patient's medication list. Nursing to verify with pre-assessment call.    Other medication HOLDING recommendations:  N/A      Prep for procedure:       Procedure information and instructions sent via [employer]         Corinne Kliber, RN  Endoscopy Procedure Pre Assessment   [phone] option 2

## 2025-01-15 NOTE — TELEPHONE ENCOUNTER
Pre assessment completed for upcoming procedure.   (Please see previous telephone encounter notes for complete details)    Procedure details:    Approximate time and facility location reviewed.   Patient is aware that endoscopy team will be calling about 2 days prior to confirm arrival time.    Designated  policy reviewed and that  requests drivers to check in and stay on campus.   *Disclaimer - please notify the  RN GI staff with any  issues/concerns.     Instructed to have someone stay 6  hours post procedure.     Medication review:    Medications reviewed. Please see supporting documentation below. Holding recommendations discussed (if applicable).       Prep for procedure:     Procedure prep instructions reviewed.        Any additional information needed:  N/A      Patient verbalized understanding and had no questions or concerns at this time.      Joan Valencia RN  Endoscopy Procedure Pre Assessment   [phone] option 2

## 2025-01-20 ENCOUNTER — VIRTUAL VISIT (OUTPATIENT)
Dept: FAMILY MEDICINE | Facility: CLINIC | Age: 55
End: 2025-01-20
Payer: COMMERCIAL

## 2025-01-20 DIAGNOSIS — E89.0 HYPOTHYROIDISM FOLLOWING RADIOIODINE THERAPY: Chronic | ICD-10-CM

## 2025-01-20 DIAGNOSIS — Z12.31 VISIT FOR SCREENING MAMMOGRAM: Primary | ICD-10-CM

## 2025-01-20 DIAGNOSIS — N95.1 MENOPAUSAL SYNDROME (HOT FLASHES): ICD-10-CM

## 2025-01-20 DIAGNOSIS — G47.00 INSOMNIA, UNSPECIFIED TYPE: ICD-10-CM

## 2025-01-20 PROCEDURE — 98006 SYNCH AUDIO-VIDEO EST MOD 30: CPT | Performed by: PHYSICIAN ASSISTANT

## 2025-01-20 RX ORDER — LEVOTHYROXINE SODIUM 112 UG/1
112 TABLET ORAL DAILY
Qty: 90 TABLET | Refills: 0 | Status: SHIPPED | OUTPATIENT
Start: 2025-01-20

## 2025-01-20 RX ORDER — PAROXETINE 7.5 MG/1
7.5 CAPSULE ORAL DAILY
Qty: 30 CAPSULE | Refills: 1 | Status: SHIPPED | OUTPATIENT
Start: 2025-01-20

## 2025-01-20 ASSESSMENT — PATIENT HEALTH QUESTIONNAIRE - PHQ9
SUM OF ALL RESPONSES TO PHQ QUESTIONS 1-9: 6
SUM OF ALL RESPONSES TO PHQ QUESTIONS 1-9: 6
10. IF YOU CHECKED OFF ANY PROBLEMS, HOW DIFFICULT HAVE THESE PROBLEMS MADE IT FOR YOU TO DO YOUR WORK, TAKE CARE OF THINGS AT HOME, OR GET ALONG WITH OTHER PEOPLE: SOMEWHAT DIFFICULT

## 2025-01-20 NOTE — PROGRESS NOTES
Liane is a 54 year old who is being evaluated via a billable video visit.    How would you like to obtain your AVS? MyChart  If the video visit is dropped, the invitation should be resent by: Text to cell phone: [phone]  Will anyone else be joining your video visit? No      Assessment & Plan     Visit for screening mammogram    - MA Screening Bilateral w/ Reynold; Future    Insomnia, unspecified type  Will try this for hot flashes and see if it helps settle her mind too to help sleep   - PARoxetine Mesylate (BRISDELLE) 7.5 MG CAPS; Take 7.5 mg by mouth daily.    Menopausal syndrome (hot flashes)  As above.  If not covered will increase to 10mg paxil   - PARoxetine Mesylate (BRISDELLE) 7.5 MG CAPS; Take 7.5 mg by mouth daily.    Hypothyroidism following radioiodine therapy  Labs have been stable.    - levothyroxine (SYNTHROID/LEVOTHROID) 112 MCG tablet; Take 1 tablet (112 mcg) by mouth daily.    Follow up in 2 -4 weeks   The longitudinal plan of care for the diagnosis(es)/condition(s) as documented were addressed during this visit. Due to the added complexity in care, I will continue to support Liane in the subsequent management and with ongoing continuity of care.            Subjective   Liane is a 54 year old, presenting for the following health issues:  Results (Ultrasound)        1/20/2025     3:51 PM   Additional Questions   Roomed by Ranjana STONE CMA   Accompanied by Self         1/20/2025     3:51 PM   Patient Reported Additional Medications   Patient reports taking the following new medications None     Video Start Time: 4:13 PM    HPI     Go over ultrasound results.  Would like to get a sleeping medication.    CT ordered by gastroenterology.    Recommendations from GI: keep good bowel habits.  Has been doing better.  Now has bm every day, most days soft.  Form stool.  Still feels bloated.  Feels a knot above belly button.  Not painful.  Did have tummy tuck with seroma afterwards. Not sure if this was the same  area.  Will wear compression underwear.  Has EGD scheduled.      Only sleeps shorts periods of time at night.  Has had sleeping issues for years.  Use to be on trazodone for sleep.  Sometimes mirtazapine helps with sleep but not always.   Sometimes takes benadryl at night too.  Not sure why she can't fall asleep.  Not sure what wakes her up.  Trying to keep sleep hygiene.    Having night sweats.  Mirtazapine has helped with appetite.                Objective           Vitals:  No vitals were obtained today due to virtual visit.    Physical Exam   GENERAL: alert and no distress  EYES: Eyes grossly normal to inspection.  No discharge or erythema, or obvious scleral/conjunctival abnormalities.  RESP: No audible wheeze, cough, or visible cyanosis.    SKIN: Visible skin clear. No significant rash, abnormal pigmentation or lesions.  NEURO: Cranial nerves grossly intact.  Mentation and speech appropriate for age.  PSYCH: Appropriate affect, tone, and pace of words          Video-Visit Details    Type of service:  Video Visit   Video End Time:4:34 PM  Originating Location (pt. Location): Home    Distant Location (provider location):  Off-site  Platform used for Video Visit: Suzanne  Signed Electronically by: Kisha Perera PA-C    .undefined[^^  Answers submitted by the patient for this visit:  Patient Health Questionnaire (Submitted on 1/20/2025)  If you checked off any problems, how difficult have these problems made it for you to do your work, take care of things at home, or get along with other people?: Somewhat difficult  PHQ9 TOTAL SCORE: 6

## 2025-01-21 ENCOUNTER — PATIENT OUTREACH (OUTPATIENT)
Dept: CARE COORDINATION | Facility: CLINIC | Age: 55
End: 2025-01-21

## 2025-01-22 ENCOUNTER — ANCILLARY PROCEDURE (OUTPATIENT)
Dept: MAMMOGRAPHY | Facility: CLINIC | Age: 55
End: 2025-01-22
Attending: PHYSICIAN ASSISTANT
Payer: COMMERCIAL

## 2025-01-22 DIAGNOSIS — Z12.31 VISIT FOR SCREENING MAMMOGRAM: ICD-10-CM

## 2025-01-22 PROCEDURE — 77067 SCR MAMMO BI INCL CAD: CPT | Mod: TC | Performed by: RADIOLOGY

## 2025-01-22 PROCEDURE — 77063 BREAST TOMOSYNTHESIS BI: CPT | Mod: TC | Performed by: RADIOLOGY

## 2025-01-27 RX ORDER — NALOXONE HYDROCHLORIDE 0.4 MG/ML
0.2 INJECTION, SOLUTION INTRAMUSCULAR; INTRAVENOUS; SUBCUTANEOUS
Status: CANCELLED | OUTPATIENT
Start: 2025-01-27

## 2025-01-27 RX ORDER — NALOXONE HYDROCHLORIDE 0.4 MG/ML
0.4 INJECTION, SOLUTION INTRAMUSCULAR; INTRAVENOUS; SUBCUTANEOUS
Status: CANCELLED | OUTPATIENT
Start: 2025-01-27

## 2025-01-27 RX ORDER — PROCHLORPERAZINE MALEATE 10 MG
10 TABLET ORAL EVERY 6 HOURS PRN
Status: CANCELLED | OUTPATIENT
Start: 2025-01-27

## 2025-01-27 RX ORDER — ONDANSETRON 2 MG/ML
4 INJECTION INTRAMUSCULAR; INTRAVENOUS EVERY 6 HOURS PRN
Status: CANCELLED | OUTPATIENT
Start: 2025-01-27

## 2025-01-27 RX ORDER — ONDANSETRON 4 MG/1
4 TABLET, ORALLY DISINTEGRATING ORAL EVERY 6 HOURS PRN
Status: CANCELLED | OUTPATIENT
Start: 2025-01-27

## 2025-01-27 RX ORDER — FLUMAZENIL 0.1 MG/ML
0.2 INJECTION, SOLUTION INTRAVENOUS
Status: CANCELLED | OUTPATIENT
Start: 2025-01-27 | End: 2025-01-27

## 2025-01-28 ENCOUNTER — HOSPITAL ENCOUNTER (OUTPATIENT)
Facility: AMBULATORY SURGERY CENTER | Age: 55
Discharge: HOME | End: 2025-01-28
Attending: INTERNAL MEDICINE | Admitting: INTERNAL MEDICINE
Payer: COMMERCIAL

## 2025-01-28 VITALS
SYSTOLIC BLOOD PRESSURE: 123 MMHG | DIASTOLIC BLOOD PRESSURE: 80 MMHG | RESPIRATION RATE: 18 BRPM | TEMPERATURE: 98.5 F | HEART RATE: 70 BPM | OXYGEN SATURATION: 100 %

## 2025-01-28 LAB — UPPER GI ENDOSCOPY: NORMAL

## 2025-01-28 PROCEDURE — G8907 PT DOC NO EVENTS ON DISCHARG: HCPCS

## 2025-01-28 PROCEDURE — 43239 EGD BIOPSY SINGLE/MULTIPLE: CPT

## 2025-01-28 PROCEDURE — G8918 PT W/O PREOP ORDER IV AB PRO: HCPCS

## 2025-01-28 RX ORDER — ONDANSETRON 2 MG/ML
4 INJECTION INTRAMUSCULAR; INTRAVENOUS
Status: COMPLETED | OUTPATIENT
Start: 2025-01-28 | End: 2025-01-28

## 2025-01-28 RX ORDER — FENTANYL CITRATE 50 UG/ML
INJECTION, SOLUTION INTRAMUSCULAR; INTRAVENOUS PRN
Status: DISCONTINUED | OUTPATIENT
Start: 2025-01-28 | End: 2025-01-28 | Stop reason: HOSPADM

## 2025-01-28 RX ORDER — LIDOCAINE 40 MG/G
CREAM TOPICAL
Status: DISCONTINUED | OUTPATIENT
Start: 2025-01-28 | End: 2025-01-30 | Stop reason: HOSPADM

## 2025-01-28 RX ADMIN — ONDANSETRON 4 MG: 2 INJECTION INTRAMUSCULAR; INTRAVENOUS at 11:21

## 2025-01-30 LAB
PATH REPORT.COMMENTS IMP SPEC: NORMAL
PATH REPORT.COMMENTS IMP SPEC: NORMAL
PATH REPORT.FINAL DX SPEC: NORMAL
PATH REPORT.GROSS SPEC: NORMAL
PATH REPORT.MICROSCOPIC SPEC OTHER STN: NORMAL
PATH REPORT.RELEVANT HX SPEC: NORMAL
PHOTO IMAGE: NORMAL

## 2025-03-23 DIAGNOSIS — N95.1 MENOPAUSAL SYNDROME (HOT FLASHES): ICD-10-CM

## 2025-03-23 DIAGNOSIS — F41.9 ANXIETY: ICD-10-CM

## 2025-03-24 RX ORDER — PAROXETINE 10 MG/1
10 TABLET, FILM COATED ORAL EVERY MORNING
Qty: 90 TABLET | Refills: 2 | Status: SHIPPED | OUTPATIENT
Start: 2025-03-24

## 2025-04-09 DIAGNOSIS — R53.83 LACK OF ENERGY: ICD-10-CM

## 2025-04-09 DIAGNOSIS — R63.4 WEIGHT LOSS: ICD-10-CM

## 2025-04-10 RX ORDER — MIRTAZAPINE 15 MG/1
15 TABLET, FILM COATED ORAL AT BEDTIME
Qty: 90 TABLET | Refills: 0 | Status: SHIPPED | OUTPATIENT
Start: 2025-04-10

## 2025-04-21 DIAGNOSIS — E89.0 HYPOTHYROIDISM FOLLOWING RADIOIODINE THERAPY: Chronic | ICD-10-CM

## 2025-04-21 RX ORDER — LEVOTHYROXINE SODIUM 112 UG/1
112 TABLET ORAL DAILY
Qty: 90 TABLET | Refills: 0 | Status: SHIPPED | OUTPATIENT
Start: 2025-04-21

## 2025-07-12 ENCOUNTER — HEALTH MAINTENANCE LETTER (OUTPATIENT)
Age: 55
End: 2025-07-12

## 2025-07-28 DIAGNOSIS — E89.0 HYPOTHYROIDISM FOLLOWING RADIOIODINE THERAPY: Chronic | ICD-10-CM

## 2025-07-28 DIAGNOSIS — R53.83 LACK OF ENERGY: ICD-10-CM

## 2025-07-28 DIAGNOSIS — R63.4 WEIGHT LOSS: ICD-10-CM

## 2025-07-29 RX ORDER — LEVOTHYROXINE SODIUM 112 UG/1
112 TABLET ORAL DAILY
Qty: 90 TABLET | Refills: 0 | Status: SHIPPED | OUTPATIENT
Start: 2025-07-29

## 2025-07-29 RX ORDER — MIRTAZAPINE 15 MG/1
15 TABLET, FILM COATED ORAL AT BEDTIME
Qty: 90 TABLET | Refills: 0 | Status: SHIPPED | OUTPATIENT
Start: 2025-07-29

## (undated) DEVICE — KIT ENDO FIRST STEP DISINFECTANT 200ML W/POUCH EP-4

## (undated) DEVICE — PREP CHLORAPREP 26ML TINTED ORANGE  260815

## (undated) DEVICE — PAD CHUX UNDERPAD 23X24" 7136

## (undated) RX ORDER — FENTANYL CITRATE 50 UG/ML
INJECTION, SOLUTION INTRAMUSCULAR; INTRAVENOUS
Status: DISPENSED
Start: 2025-01-28

## (undated) RX ORDER — ONDANSETRON 2 MG/ML
INJECTION INTRAMUSCULAR; INTRAVENOUS
Status: DISPENSED
Start: 2022-11-28

## (undated) RX ORDER — FENTANYL CITRATE 50 UG/ML
INJECTION, SOLUTION INTRAMUSCULAR; INTRAVENOUS
Status: DISPENSED
Start: 2022-11-28

## (undated) RX ORDER — LIDOCAINE/PRILOCAINE 2.5 %-2.5%
CREAM (GRAM) TOPICAL
Status: DISPENSED
Start: 2022-09-21

## (undated) RX ORDER — ONDANSETRON 2 MG/ML
INJECTION INTRAMUSCULAR; INTRAVENOUS
Status: DISPENSED
Start: 2025-01-28

## (undated) RX ORDER — LIDOCAINE HYDROCHLORIDE 10 MG/ML
INJECTION, SOLUTION EPIDURAL; INFILTRATION; INTRACAUDAL; PERINEURAL
Status: DISPENSED
Start: 2022-09-21